# Patient Record
Sex: FEMALE | Race: WHITE | Employment: OTHER | URBAN - METROPOLITAN AREA
[De-identification: names, ages, dates, MRNs, and addresses within clinical notes are randomized per-mention and may not be internally consistent; named-entity substitution may affect disease eponyms.]

---

## 2017-01-10 ENCOUNTER — TELEPHONE (OUTPATIENT)
Dept: NEUROLOGY | Facility: CLINIC | Age: 67
End: 2017-01-10

## 2017-01-10 ENCOUNTER — OFFICE VISIT (OUTPATIENT)
Dept: NEUROLOGY | Facility: CLINIC | Age: 67
End: 2017-01-10

## 2017-01-10 VITALS
WEIGHT: 207 LBS | DIASTOLIC BLOOD PRESSURE: 60 MMHG | HEART RATE: 90 BPM | BODY MASS INDEX: 34.49 KG/M2 | SYSTOLIC BLOOD PRESSURE: 114 MMHG | RESPIRATION RATE: 18 BRPM | HEIGHT: 65 IN

## 2017-01-10 DIAGNOSIS — G43.709 CHRONIC MIGRAINE WITHOUT AURA WITHOUT STATUS MIGRAINOSUS, NOT INTRACTABLE: Primary | ICD-10-CM

## 2017-01-10 PROCEDURE — 99205 OFFICE O/P NEW HI 60 MIN: CPT | Performed by: OTHER

## 2017-01-10 RX ORDER — DIVALPROEX SODIUM 500 MG/1
500 TABLET, EXTENDED RELEASE ORAL 2 TIMES DAILY
Qty: 60 TABLET | Refills: 6 | COMMUNITY
Start: 2017-01-10 | End: 2017-10-23

## 2017-01-10 RX ORDER — DIVALPROEX SODIUM 500 MG/1
500 TABLET, EXTENDED RELEASE ORAL 2 TIMES DAILY
Qty: 1 TABLET | Refills: 6 | Status: SHIPPED | OUTPATIENT
Start: 2017-01-10 | End: 2017-01-10

## 2017-01-10 RX ORDER — ANTIARTHRITIC COMBINATION NO.2 900 MG
TABLET ORAL
COMMUNITY
End: 2017-05-23

## 2017-01-10 NOTE — TELEPHONE ENCOUNTER
Mike Self calling from [address] to clarify quantity on Depakote. Noted Depakote was ordered for quantity of 1 tablet. Clarified provider meant to order 60 tabs. Mike Self verbalized understanding.  She will dispense Depakote  mg #60 tabs for p

## 2017-01-10 NOTE — PROGRESS NOTES
Joanna 1827   Neurology- INITIAL CLINIC VISIT  1/10/2017, 2:06 PM     Aleena Parsons Patient Status:  No patient class for patient encounter    1950 MRN [id number]   Location [facility], [facility] Eosinophils %        1.3   Basophils %        0.6   Immature Granulocyte %        0.3   Glucose      70-99 mg/dL 81    BUN      8-20 mg/dL 15    CREATININE      0.55-1.02 mg/dL 0.75    GFR      >=60 83    CALCIUM      8.3-10.3 mg/dL 9.6    ALKALINE PHOSP or use illicit drugs. Allergies:    Sulfa Antibiotics           MEDICATIONS:    Current outpatient prescriptions:   •  Calcium Carbonate-Vitamin D (OS-SHELBY 500 + D OR), Take 1 tablet by mouth 2 (two) times daily. , Disp: , Rfl:   •  Biotin 5000 MCG Oral T Tab, Take 100 mcg by mouth daily. , Disp: , Rfl:   •  fluticasone (FLONASE) 50 MCG/ACT Nasal Suspension, 2 sprays by Nasal route daily as needed.  Indications: Hayfever, Disp: , Rfl:   •  metFORMIN (GLUCOPHAGE) 500 MG Oral Tab, Take 500 mg by mouth 2 (two) t swelling        PHYSICAL EXAM:   Neurologic Exam  Vitals   01/10/17  1329   BP: 114/60   Pulse: 90   Resp: 18     General Appearance: Patient is a 77year old female in no acute distress  Cardiac: Normal rate & regular rhythm  Lungs: Clear to auscultation We discussed in depth regarding the diagnosis, prognosis, treatment. The patient was given ample opportunity to ask questions. All questions and concerns were addressed.      Mare Yan DO  Neurology and Neuromuscular medicine  Sulema Ayala

## 2017-01-10 NOTE — PATIENT INSTRUCTIONS
Refill policies:    • Allow 2 business days for refills; controlled substances may take longer.   • Contact your pharmacy at least 5 days prior to running out of medication and have them send an electronic request or submit request through the “request re your physician has recommended that you have a procedure or additional testing performed. [facility]) will contact your insurance carrier to obtain pre-certification or prior authorization.     Unfortunately, ABDELRAHMAN has seen an increas

## 2017-01-12 ENCOUNTER — HOSPITAL ENCOUNTER (OUTPATIENT)
Dept: MAMMOGRAPHY | Age: 67
Discharge: HOME | End: 2017-01-12
Attending: FAMILY MEDICINE
Payer: MEDICARE

## 2017-01-12 ENCOUNTER — HOSPITAL ENCOUNTER (OUTPATIENT)
Dept: BONE DENSITY | Age: 67
Discharge: HOME | End: 2017-01-12
Attending: FAMILY MEDICINE
Payer: MEDICARE

## 2017-01-12 DIAGNOSIS — Z12.31 ENCOUNTER FOR SCREENING MAMMOGRAM FOR MALIGNANT NEOPLASM OF BREAST: ICD-10-CM

## 2017-01-12 DIAGNOSIS — Z78.0 ASYMPTOMATIC MENOPAUSE: ICD-10-CM

## 2017-01-12 DIAGNOSIS — Z00.00 ROUTINE GENERAL MEDICAL EXAMINATION AT A HEALTH CARE FACILITY: ICD-10-CM

## 2017-01-12 PROCEDURE — 77080 DXA BONE DENSITY AXIAL: CPT

## 2017-01-12 PROCEDURE — 77063 BREAST TOMOSYNTHESIS BI: CPT

## 2017-01-12 PROCEDURE — 77067 SCR MAMMO BI INCL CAD: CPT

## 2017-01-19 ENCOUNTER — HOSPITAL ENCOUNTER (OUTPATIENT)
Dept: MAMMOGRAPHY | Facility: HOSPITAL | Age: 67
Discharge: HOME | End: 2017-01-19
Attending: FAMILY MEDICINE
Payer: MEDICARE

## 2017-01-19 DIAGNOSIS — R92.2 INCONCLUSIVE MAMMOGRAM: ICD-10-CM

## 2017-01-19 PROCEDURE — 76642 ULTRASOUND BREAST LIMITED: CPT

## 2017-01-19 PROCEDURE — 77065 DX MAMMO INCL CAD UNI: CPT

## 2017-01-19 PROCEDURE — 77061 BREAST TOMOSYNTHESIS UNI: CPT

## 2017-01-30 ENCOUNTER — NURSE ONLY (OUTPATIENT)
Dept: FAMILY MEDICINE CLINIC | Facility: CLINIC | Age: 67
End: 2017-01-30

## 2017-01-30 VITALS
TEMPERATURE: 98 F | HEIGHT: 65 IN | SYSTOLIC BLOOD PRESSURE: 110 MMHG | DIASTOLIC BLOOD PRESSURE: 72 MMHG | BODY MASS INDEX: 34.16 KG/M2 | RESPIRATION RATE: 16 BRPM | HEART RATE: 85 BPM | OXYGEN SATURATION: 96 % | WEIGHT: 205 LBS

## 2017-01-30 DIAGNOSIS — J06.9 VIRAL UPPER RESPIRATORY TRACT INFECTION: Primary | ICD-10-CM

## 2017-01-30 PROCEDURE — 99213 OFFICE O/P EST LOW 20 MIN: CPT | Performed by: NURSE PRACTITIONER

## 2017-01-31 NOTE — PATIENT INSTRUCTIONS
Understanding the Cold Virus  Colds are the most common illness that people get. Most adults get 2 or 3 colds per year, and most children get 5 to 7 colds per year. Colds may be caused by over 200 types of viruses.  The most common of these are rhinovirus You can help reduce the spread of cold viruses. This can help both you and others avoid getting colds. Follow these tips:  · Wash your hands well anytime you may have come into contact with cold viruses. Wash your hands for at least 20 seconds.  When you ca

## 2017-01-31 NOTE — PROGRESS NOTES
CHIEF COMPLAINT:   Patient presents with:  Sinus Problem: s/s for 3 days  Nasal Congestion  Ear Problem: fells full      HPI:   Ovi Mauricio is a 77year old female who presents for upper respiratory symptoms for  3 days.  Patient reports sore throat, Coenzyme Q10 (COQ10) 400 MG Oral Cap Take 400 mg by mouth every morning. Disp:  Rfl:    Multiple Vitamins-Minerals (ALIVE WOMENS 50+) Oral Tab Take 1 tablet by mouth every morning.  Indications: supplement Disp:  Rfl:    Probiotic Product (PROBIOTIC OR) [facility] KNEE SURGERY  3/8/2015     KNEE SURGERY  9/8/2015     Comment 3 knee surgeries, replacement and arthoscopy     COLONOSCOPY  2012    Comment [facility]- diverticulosis , polyps.  had EGD same time           Smoking Status: Never Smoker                      S Educated on using supportive medication as needed  Educated on s/s of worsening sx and when to seek higher level of care  Follow up with pcp if not improving    Meds & Refills for this Visit:    No prescriptions requested or ordered in this encounter    Ri Antibiotics are not helpful for a cold. They do not make a cold shorter or relieve symptoms. Taking antibiotics when you don’t need them can make them work less well when you need them for another illness.   Follow all directions for using medicines, especi © 8428-9626 The [address] [place] [address], [address]. All rights reserved. This information is not intended as a substitute for professional medical care. Always follow your healthcare professional's instructions.             The

## 2017-02-08 ENCOUNTER — APPOINTMENT (OUTPATIENT)
Dept: LAB | Age: 67
End: 2017-02-08
Attending: ORTHOPAEDIC SURGERY
Payer: MEDICARE

## 2017-02-08 DIAGNOSIS — E87.1 HYPONATREMIA: ICD-10-CM

## 2017-02-08 DIAGNOSIS — Z01.818 PREPROCEDURAL EXAMINATION: ICD-10-CM

## 2017-02-08 LAB
ALBUMIN SERPL-MCNC: 3.7 G/DL (ref 3.5–4.8)
ALP LIVER SERPL-CCNC: 76 U/L (ref 55–142)
ALT SERPL-CCNC: 22 U/L (ref 14–54)
AST SERPL-CCNC: 16 U/L (ref 15–41)
BILIRUB SERPL-MCNC: 0.4 MG/DL (ref 0.1–2)
BUN BLD-MCNC: 17 MG/DL (ref 8–20)
CALCIUM BLD-MCNC: 8.9 MG/DL (ref 8.3–10.3)
CHLORIDE: 97 MMOL/L (ref 101–111)
CO2: 33 MMOL/L (ref 22–32)
CREAT BLD-MCNC: 0.76 MG/DL (ref 0.55–1.02)
GLUCOSE BLD-MCNC: 87 MG/DL (ref 70–99)
M PROTEIN MFR SERPL ELPH: 7.1 G/DL (ref 6.1–8.3)
POTASSIUM SERPL-SCNC: 4.7 MMOL/L (ref 3.6–5.1)
SODIUM SERPL-SCNC: 134 MMOL/L (ref 136–144)

## 2017-02-08 PROCEDURE — 36415 COLL VENOUS BLD VENIPUNCTURE: CPT

## 2017-02-08 PROCEDURE — 80053 COMPREHEN METABOLIC PANEL: CPT

## 2017-02-10 ENCOUNTER — TELEPHONE (OUTPATIENT)
Dept: FAMILY MEDICINE CLINIC | Facility: CLINIC | Age: 67
End: 2017-02-10

## 2017-02-10 NOTE — TELEPHONE ENCOUNTER
LMOM to return my call. I advised patient to call [phone] along with office hours given.      Notes Recorded by Jose Antunez DO on 2/10/2017 at 1:00 AM  Still with borderline hyponatremia.  Etiology unclear  Refer Nephrology  Dr. Church     Nephrolog

## 2017-02-10 NOTE — TELEPHONE ENCOUNTER
Instructed patient to follow up with Dr. Jerald Weeks nephrologist due to borderline hyponatremia. Provided patient with drMaria Teresa's name, phone, and address. Instructed to patient to call our office with further questions.      Notes Recorded by Rica Hall DO on 2/

## 2017-02-23 ENCOUNTER — TELEPHONE (OUTPATIENT)
Dept: OBGYN CLINIC | Facility: CLINIC | Age: 67
End: 2017-02-23

## 2017-02-23 NOTE — TELEPHONE ENCOUNTER
Patient was no show today for her appointment with Dr. Mony Ford in our [place] office.  LM to call office back if wants to reschedule

## 2017-03-02 ENCOUNTER — TELEPHONE (OUTPATIENT)
Dept: OBGYN CLINIC | Facility: CLINIC | Age: 67
End: 2017-03-02

## 2017-03-07 ENCOUNTER — OFFICE VISIT (OUTPATIENT)
Dept: FAMILY MEDICINE CLINIC | Facility: CLINIC | Age: 67
End: 2017-03-07

## 2017-03-07 VITALS
RESPIRATION RATE: 16 BRPM | HEART RATE: 86 BPM | DIASTOLIC BLOOD PRESSURE: 60 MMHG | OXYGEN SATURATION: 98 % | BODY MASS INDEX: 34 KG/M2 | SYSTOLIC BLOOD PRESSURE: 118 MMHG | WEIGHT: 206 LBS | TEMPERATURE: 98 F

## 2017-03-07 DIAGNOSIS — J01.10 ACUTE NON-RECURRENT FRONTAL SINUSITIS: Primary | ICD-10-CM

## 2017-03-07 DIAGNOSIS — L84 FOOT CALLUS: ICD-10-CM

## 2017-03-07 PROCEDURE — 99213 OFFICE O/P EST LOW 20 MIN: CPT | Performed by: FAMILY MEDICINE

## 2017-03-07 RX ORDER — AMOXICILLIN AND CLAVULANATE POTASSIUM 875; 125 MG/1; MG/1
1 TABLET, FILM COATED ORAL 2 TIMES DAILY
Qty: 20 TABLET | Refills: 0 | Status: SHIPPED | OUTPATIENT
Start: 2017-03-07 | End: 2017-03-23

## 2017-03-07 RX ORDER — FLUTICASONE PROPIONATE 50 MCG
2 SPRAY, SUSPENSION (ML) NASAL DAILY
Qty: 1 BOTTLE | Refills: 6 | Status: SHIPPED | OUTPATIENT
Start: 2017-03-07 | End: 2017-03-07

## 2017-03-07 RX ORDER — FLUTICASONE PROPIONATE 50 MCG
SPRAY, SUSPENSION (ML) NASAL
Qty: 3 BOTTLE | Refills: 1 | Status: SHIPPED | OUTPATIENT
Start: 2017-03-07 | End: 2018-02-28

## 2017-03-07 NOTE — PATIENT INSTRUCTIONS
Dr. Cintron Flax and Ankle surgery    [address], [place]: [address] [facility].     Phone: [phone]  Fax [phone]      · Please increase your fluids and rest.   · PLEASE take all your antibiotic as prescribed or the infection will be The sinuses are air-filled spaces within the bones of the face. They connect to the inside of the nose. Sinusitis is an inflammation of the tissue lining the sinus cavity. Sinus inflammation can occur during a cold.  It can also be due to allergies to polle · Do not use nasal rinses or irrigation during an acute sinus infection, unless told to by your health care provider. Rinsing may spread the infection to other sinuses.   · Use acetaminophen or ibuprofen to control pain, unless another pain medicine was pre Colds and other infections  A cold or flu may cause your sinus and nasal linings to swell. Sinus openings can become blocked. This causes mucus to back up. This backed-up mucus becomes an ideal place for bacteria to grow.  Thick, yellow, or discolored mucus Drink fluids  Drinking extra fluids helps thin your mucus. This lets it drain from your sinuses more easily. Have a glass of water every hour or two. A humidifier helps in much the same way. Fluids can also offset the drying effects of certain medicines.  I When traveling on an airplane, use saline nasal spray to keep your sinuses moist. Drink plenty of fluids. You may also want to take a decongestant before you get on the plane. Prevent colds  Do what you can to avoid being exposed to colds and flu.  When p © 0118-0674 [address], [address] [place]. All rights reserved. This information is not intended as a substitute for professional medical care. Always follow your healthcare professional's instructions.

## 2017-03-07 NOTE — PROGRESS NOTES
Patient presents with:  Sinus Problem: 2 weeks  Cough: 2 weeks  Ear Pain: recently      HPI:    Ovi Mauricio is a 77year old female. Presents with sinus issues.  Symptoms started   14 days ago with purulent nasal discharge-yellow, maxillary sinus pre MG Oral Tab Take 1 tablet (40 mg total) by mouth nightly. Disp: 90 tablet Rfl: 1   lisinopril 10 MG Oral Tab Take 1 tablet (10 mg total) by mouth daily.  Disp: 90 tablet Rfl: 1   Levothyroxine Sodium (SYNTHROID) 50 MCG Oral Tab Take 1 tablet (50 mcg total) Disp:  Rfl:       Past Medical History   Diagnosis Date   • Extrinsic asthma, unspecified    • Unspecified essential hypertension    • Other and unspecified hyperlipidemia    • HEADACHES      uses depakote   • Esophageal reflux      last EGD 2014-gastritis no septal deviations. Mouth: moist with mild erythema, no exudates, and + PND. Neck: Supple with no cervical LAD. Lungs: Clear to auscultation bilaterally; no wheeze, rhonchi, or rales. Heart: S1/S2 regular no murmurs. Skin: No acute rashes.   Ref

## 2017-03-14 ENCOUNTER — OFFICE VISIT (OUTPATIENT)
Dept: FAMILY MEDICINE CLINIC | Facility: CLINIC | Age: 67
End: 2017-03-14

## 2017-03-14 VITALS
RESPIRATION RATE: 16 BRPM | SYSTOLIC BLOOD PRESSURE: 122 MMHG | HEART RATE: 84 BPM | DIASTOLIC BLOOD PRESSURE: 68 MMHG | OXYGEN SATURATION: 99 % | TEMPERATURE: 98 F

## 2017-03-14 DIAGNOSIS — Z96.659 PAINFUL TOTAL KNEE REPLACEMENT, INITIAL ENCOUNTER (HCC): ICD-10-CM

## 2017-03-14 DIAGNOSIS — J01.10 ACUTE NON-RECURRENT FRONTAL SINUSITIS: Primary | ICD-10-CM

## 2017-03-14 DIAGNOSIS — M75.22 BICEPS TENDONITIS, LEFT: ICD-10-CM

## 2017-03-14 DIAGNOSIS — T84.84XA PAINFUL TOTAL KNEE REPLACEMENT, INITIAL ENCOUNTER (HCC): ICD-10-CM

## 2017-03-14 PROBLEM — M75.20 BICEPS TENDONITIS: Status: ACTIVE | Noted: 2017-03-14

## 2017-03-14 PROCEDURE — 99214 OFFICE O/P EST MOD 30 MIN: CPT | Performed by: FAMILY MEDICINE

## 2017-03-14 RX ORDER — METHYLPREDNISOLONE 4 MG/1
TABLET ORAL
Qty: 21 TABLET | Refills: 0 | Status: SHIPPED | OUTPATIENT
Start: 2017-03-14 | End: 2017-03-21 | Stop reason: ALTCHOICE

## 2017-03-14 RX ORDER — AMOXICILLIN AND CLAVULANATE POTASSIUM 875; 125 MG/1; MG/1
1 TABLET, FILM COATED ORAL 2 TIMES DAILY
Qty: 14 TABLET | Refills: 1 | Status: SHIPPED | OUTPATIENT
Start: 2017-03-14 | End: 2017-03-18

## 2017-03-14 RX ORDER — BUPROPION HYDROCHLORIDE 300 MG/1
TABLET ORAL
Refills: 2 | COMMUNITY
Start: 2017-03-07 | End: 2020-09-11

## 2017-03-14 RX ORDER — TRAMADOL HYDROCHLORIDE 50 MG/1
50 TABLET ORAL EVERY 6 HOURS PRN
Qty: 30 TABLET | Refills: 1 | Status: SHIPPED | OUTPATIENT
Start: 2017-03-14 | End: 2017-03-30 | Stop reason: ALTCHOICE

## 2017-03-14 NOTE — PATIENT INSTRUCTIONS
[facility] [address] [facility], [address] [facility], [address]   [address], [address] [facility], [facility] 95   [address], [address] [place] [address], [address] [place] [address] Terri Barksdale

## 2017-03-14 NOTE — PROGRESS NOTES
Patient presents with:  Arm Pain: L side, started this morning  Sinus Problem: continues  Fatigue      HPI:    Paz Villalobos is a 77year old female. Presents with sinus issues and left shoulder pain and needs placard renewed for right knee pain.   [place] handicapped parking. Will  frequently will rest when walking and shopping. Denies pain in the calf or claudication symptoms.         Current Outpatient Prescriptions:  Amoxicillin-Pot Clavulanate 875-125 MG Oral Tab Take 1 tablet by mouth 2 (two) times vikki Omeprazole 40 MG Oral Capsule Delayed Release Take 1 capsule (40 mg total) by mouth daily. In am after synthroid Disp: 90 capsule Rfl: 1   Coenzyme Q10 (COQ10) 400 MG Oral Cap Take 400 mg by mouth every morning.  Disp:  Rfl:    Probiotic Product (Leonardo Spindle • Hypertension Mother    • Lipids Mother    • Diabetes Maternal Grandmother    • Cancer Paternal Grandmother    • Hypertension Paternal Grandmother    • Heart Disorder Maternal Uncle    • Obesity Paternal Grandmother       Social History:    Smoking Stat surgical changes and limited range of motion flexion only to 90° and can extend to 110°. ASSESSMENT/ PLAN:   Armani Hinojosa is a 77year old female. Presents with sinus issues.     1. Acute non-recurrent frontal sinusitis  Improved but not resolved  Ne

## 2017-03-21 ENCOUNTER — HOSPITAL ENCOUNTER (OUTPATIENT)
Dept: GENERAL RADIOLOGY | Age: 67
Discharge: HOME | End: 2017-03-21
Attending: FAMILY MEDICINE
Payer: MEDICARE

## 2017-03-21 DIAGNOSIS — M75.22 BICEPS TENDONITIS, LEFT: ICD-10-CM

## 2017-03-21 PROCEDURE — 73030 X-RAY EXAM OF SHOULDER: CPT

## 2017-03-22 ENCOUNTER — TELEPHONE (OUTPATIENT)
Dept: FAMILY MEDICINE CLINIC | Facility: CLINIC | Age: 67
End: 2017-03-22

## 2017-03-22 NOTE — TELEPHONE ENCOUNTER
Spoke with pt regarding results and recommendations, appointment scheduled      Notes Recorded by Andrew Alatorre DO on 3/21/2017 at 4:38 PM  Degenerative changes.  Needs FU appt to discuss results.  May follow-up this week or wait until first week of April

## 2017-03-23 ENCOUNTER — OFFICE VISIT (OUTPATIENT)
Dept: FAMILY MEDICINE CLINIC | Facility: CLINIC | Age: 67
End: 2017-03-23

## 2017-03-23 VITALS
HEART RATE: 88 BPM | BODY MASS INDEX: 34 KG/M2 | TEMPERATURE: 98 F | SYSTOLIC BLOOD PRESSURE: 118 MMHG | DIASTOLIC BLOOD PRESSURE: 74 MMHG | WEIGHT: 204 LBS | OXYGEN SATURATION: 98 % | RESPIRATION RATE: 18 BRPM

## 2017-03-23 DIAGNOSIS — M75.22 BICEPS TENDONITIS, LEFT: Primary | ICD-10-CM

## 2017-03-23 PROCEDURE — 99213 OFFICE O/P EST LOW 20 MIN: CPT | Performed by: FAMILY MEDICINE

## 2017-03-23 NOTE — PATIENT INSTRUCTIONS
Please follow up with   [facility]  [id number] [facility]  (ADULTS)- Office: [phone]  · ?   Dr. Satya Pérez Dr[address] [facility] at 9: 00am        Understanding Biceps Tendonitis    A tendon is a strong band of · Medicines. These help relieve pain and swelling. NSAIDs (nonsteroidal anti-inflammatory drugs) are the most common medicines used. Medicines may be prescribed or bought over-the-counter. They may be given as pills.  Or they may be applied to the skin in t Arthroscopic capsular release may free the capsule and ligaments (area shown above with dotted line). Injection therapy  Your healthcare provider may suggest injection therapy. This does not cure frozen shoulder.  But it may reduce pain, so that you can

## 2017-03-23 NOTE — PROGRESS NOTES
Patient presents with: Follow - Up: x-ray results       HPI:    Joseph Sykes is a 77year old female. Presents for follow-up of left shoulder pain ×2-3 weeks with minimal improvement. Completed steroid pack and felt \"a little better\".   Still with to check blood sugar daily. Disp: 1 Box Rfl: 0   ferrous sulfate 325 (65 FE) MG Oral Tab EC Take 325 mg by mouth daily with breakfast. Disp:  Rfl:    Montelukast Sodium 10 MG Oral Tab Take 10 mg by mouth nightly.  Disp:  Rfl:    Brexpiprazole (REXULTI) 1 MG 1      Past Medical History   Diagnosis Date   • Extrinsic asthma, unspecified    • Unspecified essential hypertension    • Other and unspecified hyperlipidemia    • HEADACHES      uses depakote   • Esophageal reflux      last EGD [id number] (Oral)  Resp 18  Wt 204 lb  SpO2 98%  General: Well-nourished, well hydrated. No acute distress. No pallor. No tachypnea. Non toxic. HEENT: normocephaly, atraumatic. Sclera clear and non icteric bilaterally. Neck: supple. No adenopathy. No thyromegaly. Along bicipital groove and loss of range of motion. No pain over rotator cuff impingement sign is negative.    Will refer to Dr. Rachel Mendez at 9:00am tomorrow  for possible steroid injection or further evaluation-possible frozen shoulder -since difficult to l

## 2017-03-24 PROBLEM — M75.52 BURSITIS, SHOULDER, LEFT: Status: ACTIVE | Noted: 2017-03-24

## 2017-03-24 PROBLEM — M75.42 IMPINGEMENT SYNDROME, SHOULDER, LEFT: Status: ACTIVE | Noted: 2017-03-24

## 2017-03-30 ENCOUNTER — EKG ENCOUNTER (OUTPATIENT)
Dept: LAB | Facility: HOSPITAL | Age: 67
End: 2017-03-30
Payer: MEDICARE

## 2017-03-30 ENCOUNTER — OFFICE VISIT (OUTPATIENT)
Dept: NEPHROLOGY | Facility: CLINIC | Age: 67
End: 2017-03-30

## 2017-03-30 VITALS — WEIGHT: 202 LBS | SYSTOLIC BLOOD PRESSURE: 134 MMHG | BODY MASS INDEX: 34 KG/M2 | DIASTOLIC BLOOD PRESSURE: 76 MMHG

## 2017-03-30 DIAGNOSIS — E87.1 HYPONATREMIA: Primary | ICD-10-CM

## 2017-03-30 DIAGNOSIS — Z86.010 HISTORY OF COLON POLYPS: ICD-10-CM

## 2017-03-30 LAB
ATRIAL RATE: 90 BPM
P AXIS: 61 DEGREES
P-R INTERVAL: 144 MS
Q-T INTERVAL: 362 MS
QRS DURATION: 88 MS
QTC CALCULATION (BEZET): 442 MS
R AXIS: 51 DEGREES
T AXIS: 34 DEGREES
VENTRICULAR RATE: 90 BPM

## 2017-03-30 PROCEDURE — 99204 OFFICE O/P NEW MOD 45 MIN: CPT | Performed by: INTERNAL MEDICINE

## 2017-03-30 PROCEDURE — 93005 ELECTROCARDIOGRAM TRACING: CPT

## 2017-03-30 PROCEDURE — 93010 ELECTROCARDIOGRAM REPORT: CPT | Performed by: INTERNAL MEDICINE

## 2017-03-30 RX ORDER — CETIRIZINE HYDROCHLORIDE 10 MG/1
10 TABLET ORAL AS NEEDED
COMMUNITY
End: 2017-05-23

## 2017-03-30 RX ORDER — DICYCLOMINE HCL 20 MG
20 TABLET ORAL DAILY
COMMUNITY
End: 2017-05-23 | Stop reason: ALTCHOICE

## 2017-03-30 NOTE — PROGRESS NOTES
Nephrology Consult Note    REASON FOR CONSULT: Hyponatremia    ASSESSMENT/PLAN:        1) Hyponatremia- recently low serum sodium in the 133-135 mEq/L range likely reflects a combination of a low-sodium diet, generous fluid intake (3-4 L qd), and medicatio 3-4 L per day; she also tries maintain a low-sodium diet. There is no history of other endocrine disorders such as thyroid dysfunction, adrenal insufficiency or pituitary abnormalities. There is no history of malignancy. She does not smoke or drink. Calcium Carbonate-Vitamin D (OS-SHELBY 500 + D OR) Take 1 tablet by mouth 2 (two) times daily. Disp:  Rfl:    Biotin 5000 MCG Oral Tab Take by mouth.  Disp:  Rfl:    divalproex Sodium  MG Oral Tablet 24 Hr Take 1 tablet (500 mg total) by mouth 2 (two) times daily. Disp: 85 g Rfl: 1       Allergies:    Sulfa Antibiotics       Unknown    Comment:Carried over from childhood    Social History:  Social History    Marital Status:               Spouse Name:                       Years of Education:

## 2017-04-06 ENCOUNTER — ANESTHESIA EVENT (OUTPATIENT)
Dept: ENDOSCOPY | Facility: HOSPITAL | Age: 67
End: 2017-04-06
Payer: MEDICARE

## 2017-04-07 ENCOUNTER — ANESTHESIA (OUTPATIENT)
Dept: ENDOSCOPY | Facility: HOSPITAL | Age: 67
End: 2017-04-07
Payer: MEDICARE

## 2017-04-07 ENCOUNTER — HOSPITAL ENCOUNTER (OUTPATIENT)
Facility: HOSPITAL | Age: 67
Setting detail: HOSPITAL OUTPATIENT SURGERY
Discharge: HOME | End: 2017-04-07
Attending: INTERNAL MEDICINE | Admitting: INTERNAL MEDICINE
Payer: MEDICARE

## 2017-04-07 ENCOUNTER — SURGERY (OUTPATIENT)
Age: 67
End: 2017-04-07

## 2017-04-07 VITALS
HEIGHT: 65 IN | SYSTOLIC BLOOD PRESSURE: 95 MMHG | TEMPERATURE: 98 F | BODY MASS INDEX: 32.65 KG/M2 | OXYGEN SATURATION: 100 % | DIASTOLIC BLOOD PRESSURE: 78 MMHG | RESPIRATION RATE: 18 BRPM | HEART RATE: 78 BPM | WEIGHT: 196 LBS

## 2017-04-07 DIAGNOSIS — Z86.010 HISTORY OF COLON POLYPS: Primary | ICD-10-CM

## 2017-04-07 PROCEDURE — 0DJD8ZZ INSPECTION OF LOWER INTESTINAL TRACT, VIA NATURAL OR ARTIFICIAL OPENING ENDOSCOPIC: ICD-10-PCS | Performed by: INTERNAL MEDICINE

## 2017-04-07 PROCEDURE — 82962 GLUCOSE BLOOD TEST: CPT

## 2017-04-07 RX ORDER — DEXTROSE MONOHYDRATE 25 G/50ML
50 INJECTION, SOLUTION INTRAVENOUS
Status: CANCELLED | OUTPATIENT
Start: 2017-04-07

## 2017-04-07 RX ORDER — SODIUM CHLORIDE, SODIUM LACTATE, POTASSIUM CHLORIDE, CALCIUM CHLORIDE 600; 310; 30; 20 MG/100ML; MG/100ML; MG/100ML; MG/100ML
INJECTION, SOLUTION INTRAVENOUS CONTINUOUS
Status: CANCELLED | OUTPATIENT
Start: 2017-04-07

## 2017-04-07 RX ORDER — SODIUM CHLORIDE, SODIUM LACTATE, POTASSIUM CHLORIDE, CALCIUM CHLORIDE 600; 310; 30; 20 MG/100ML; MG/100ML; MG/100ML; MG/100ML
INJECTION, SOLUTION INTRAVENOUS CONTINUOUS
Status: DISCONTINUED | OUTPATIENT
Start: 2017-04-07 | End: 2017-04-07

## 2017-04-07 RX ORDER — NALOXONE HYDROCHLORIDE 0.4 MG/ML
80 INJECTION, SOLUTION INTRAMUSCULAR; INTRAVENOUS; SUBCUTANEOUS AS NEEDED
Status: CANCELLED | OUTPATIENT
Start: 2017-04-07 | End: 2017-04-07

## 2017-04-07 NOTE — ANESTHESIA POSTPROCEDURE EVALUATION
[facility] Patient Status:  Hospital Outpatient Surgery   Age/Gender 77year old female MRN [id number]   [facility] Attending Ana Frederick MD   Hosp Day # 0 PCP Martin Rosas DO       Anesthesia Post-op

## 2017-04-07 NOTE — H&P
[facility]  Pre-procedure History and Physical      Srinivasa Valadez Issa Patient Status:  Hospital Outpatient Surgery    1950 MRN [id number]   [facility] ENDOSCOPY Attending Ana Frederick MD   [facility] Day #  PCP Martin Rosas DO

## 2017-04-07 NOTE — ANESTHESIA PREPROCEDURE EVALUATION
PRE-OP EVALUATION    Patient Name: Saida Parsons    Pre-op Diagnosis: History of colon polyps [Z86.010]    Procedure(s):  COLONOSCOPY    Surgeon(s) and Role:     Vandana Beaver MD - Primary    Pre-op vitals reviewed.   Temp: 98.2 °F (36.8 °C)  Pulse Rfl: 1   lisinopril 10 MG Oral Tab Take 1 tablet (10 mg total) by mouth daily. Disp: 90 tablet Rfl: 1   Levothyroxine Sodium (SYNTHROID) 50 MCG Oral Tab Take 1 tablet (50 mcg total) by mouth daily.  Disp: 90 tablet Rfl: 1   Omeprazole 40 MG Oral Capsule Del Smokeless tobacco: Never Used    Alcohol Use: No       Drug Use: No     Available pre-op labs reviewed.        Lab Results  Component Value Date   * 02/08/2017   K 4.7 02/08/2017   CL 97* 02/08/2017   CO2 33.0* 02/08/2017   BUN 17 02/08/2017   ENOCH

## 2017-04-07 NOTE — OPERATIVE REPORT
[facility]  Colonoscopy Report      Cynthia Hartilippishmael Patient Status:  Hospital Outpatient Surgery    1950 MRN [id number]   [facility] ENDOSCOPY Attending Marlene Spera MD       DATE OF OPERATION: 2017     PREOPERATIVE DAVINA

## 2017-05-13 ENCOUNTER — OFFICE VISIT (OUTPATIENT)
Dept: FAMILY MEDICINE CLINIC | Facility: CLINIC | Age: 67
End: 2017-05-13

## 2017-05-13 VITALS
DIASTOLIC BLOOD PRESSURE: 60 MMHG | OXYGEN SATURATION: 98 % | TEMPERATURE: 98 F | SYSTOLIC BLOOD PRESSURE: 98 MMHG | HEART RATE: 91 BPM | RESPIRATION RATE: 16 BRPM

## 2017-05-13 DIAGNOSIS — J02.9 PHARYNGITIS, UNSPECIFIED ETIOLOGY: Primary | ICD-10-CM

## 2017-05-13 DIAGNOSIS — Z20.818 STREP THROAT EXPOSURE: ICD-10-CM

## 2017-05-13 PROCEDURE — 99213 OFFICE O/P EST LOW 20 MIN: CPT | Performed by: PHYSICIAN ASSISTANT

## 2017-05-13 PROCEDURE — 87081 CULTURE SCREEN ONLY: CPT | Performed by: PHYSICIAN ASSISTANT

## 2017-05-13 PROCEDURE — 87880 STREP A ASSAY W/OPTIC: CPT | Performed by: PHYSICIAN ASSISTANT

## 2017-05-13 RX ORDER — BENZONATATE 200 MG/1
200 CAPSULE ORAL 3 TIMES DAILY PRN
Qty: 30 CAPSULE | Refills: 0 | Status: SHIPPED | OUTPATIENT
Start: 2017-05-13 | End: 2017-05-23 | Stop reason: ALTCHOICE

## 2017-05-13 NOTE — PROGRESS NOTES
CHIEF COMPLAINT:   Patient presents with:  Sore Throat: sinus headache.  symptoms last night.  grandchildren have strep. HPI:      Neal Springer is a 77year old female who presents for upper respiratory symptoms since last night.   She reports so Product (PROBIOTIC OR) Take 1 capsule by mouth every morning. Indications: supplement Disp:  Rfl:    vitamin B-12 (CYANOCOBALAMIN) 100 MCG Oral Tab Take 100 mcg by mouth daily.  Disp:  Rfl:    metFORMIN (GLUCOPHAGE) 500 MG Oral Tab Take 500 mg by mouth 2 (t Comment right arm carpal tunnel    KNEE SURGERY  3/8/2015     KNEE SURGERY  9/8/2015     Comment 3 knee surgeries, replacement and arthoscopy     COLONOSCOPY  2012    Comment [facility]- diverticulosis , polyps.  had EGD same time    COLONOSCOPY N/A 4/7/2017 Encounter  Strep A Assay W/Optic  Grp A Strep Cult, Throat    Meds & Refills for this Visit:  Signed Prescriptions Disp Refills    benzonatate 200 MG Oral Cap 30 capsule 0      Sig: Take 1 capsule (200 mg total) by mouth 3 (three) times daily as needed for

## 2017-05-13 NOTE — PATIENT INSTRUCTIONS
Viral Pharyngitis (Sore Throat)    You (or your child, if your child is the patient) have pharyngitis (sore throat). This infection is caused by a virus. It can cause throat pain that is worse when swallowing, aching all over, headache, and fever.  The in · Fever as directed by your doctor.  For children, seek care if:  ¨ Your child is of any age and has repeated fevers above 104°F (40°C). ¨ Your child is younger than 3years of age and has a fever of 100.4°F (38°C) that continues for more than 1 day.   Deanna Lema

## 2017-05-23 ENCOUNTER — OFFICE VISIT (OUTPATIENT)
Dept: NEUROLOGY | Facility: CLINIC | Age: 67
End: 2017-05-23

## 2017-05-23 VITALS
DIASTOLIC BLOOD PRESSURE: 66 MMHG | WEIGHT: 199 LBS | RESPIRATION RATE: 16 BRPM | BODY MASS INDEX: 33 KG/M2 | HEART RATE: 92 BPM | SYSTOLIC BLOOD PRESSURE: 118 MMHG

## 2017-05-23 DIAGNOSIS — G43.709 CHRONIC MIGRAINE WITHOUT AURA WITHOUT STATUS MIGRAINOSUS, NOT INTRACTABLE: Primary | ICD-10-CM

## 2017-05-23 PROCEDURE — 99214 OFFICE O/P EST MOD 30 MIN: CPT | Performed by: OTHER

## 2017-05-23 RX ORDER — OXYCODONE HYDROCHLORIDE AND ACETAMINOPHEN 5; 325 MG/1; MG/1
1 TABLET ORAL EVERY 4 HOURS PRN
Qty: 10 TABLET | Refills: 0 | Status: SHIPPED | OUTPATIENT
Start: 2017-05-23 | End: 2017-09-22 | Stop reason: ALTCHOICE

## 2017-05-23 RX ORDER — ARIPIPRAZOLE 2 MG/1
2 TABLET ORAL EVERY EVENING
COMMUNITY
End: 2019-01-11

## 2017-05-23 NOTE — PROGRESS NOTES
Pt here for migraine follow up. Pt reports she hadn't had one until a few weeks ago, but thinks it might be due to weather. Pt here to discuss next steps.

## 2017-05-23 NOTE — PATIENT INSTRUCTIONS
Refill policies:    • Allow 2-3 business days for refills; controlled substances may take longer.   • Contact your pharmacy at least 5 days prior to running out of medication and have them send an electronic request or submit request through the [facility] have a procedure or additional testing performed. [facility]) will contact your insurance carrier to obtain pre-certification or prior authorization.     Unfortunately, ABDELRAHMAN has seen an increase in denial of payment even though the p

## 2017-05-23 NOTE — PROGRESS NOTES
Joanna 1827   Neurology- INITIAL CLINIC VISIT  1/10/2017, 2:06 PM     Saida Parsons Patient Status:  No patient class for patient encounter    1950 MRN [id number]   Location [address] [place] [address], [address] [place] [address] [facility] 0. 00-0.30 x10(3) uL  0.09   Basophils Absolute      0.00-0.10 x10(3) uL  0.04   Immature Granulocyte Absolute      0.00-1.00 x10(3) uL  0.02   Neutrophils %        60.9   Lymphocytes %        27.9   Monocytes %        9.0   Eosinophils %        1.3   Basop Comment Procedure: COLONOSCOPY;  Surgeon: Berry Hills MD;  Location: [address] [place] [address] ENDOSCOPY       Family History:  family history includes Cancer in her paternal grandmother; Diabetes in her maternal grandmother; Heart Disorder in her maternal uncle; Hypertensio 10 MG Oral Tab, Take 10 mg by mouth nightly., Disp: , Rfl:   •  Atorvastatin Calcium 40 MG Oral Tab, Take 1 tablet (40 mg total) by mouth nightly., Disp: 90 tablet, Rfl: 1  •  lisinopril 10 MG Oral Tab, Take 1 tablet (10 mg total) by mouth daily. , Disp: 90 05/23/17  1456   BP: 118/66   Pulse: 92   Resp: 16     General Appearance: Patient is a 77year old female in no acute distress  Cardiac: Normal rate & regular rhythm  Lungs: Clear to auscultation bilaterally  Skin: There are no rashes or other skin lesion not covered, will use alternative oxycodone/APAP, 10 tabs  discussed MOH and to avoid by taking abortives not more than 2-3 times per week        Signed Prescriptions Disp Refills    oxyCODONE-acetaminophen 5-325 MG Oral Tab 10 tablet 0      Sig: Take 1 ta

## 2017-06-12 DIAGNOSIS — Z87.19 HISTORY OF GASTRITIS: Primary | ICD-10-CM

## 2017-06-12 RX ORDER — OMEPRAZOLE 40 MG/1
40 CAPSULE, DELAYED RELEASE ORAL DAILY
Qty: 90 CAPSULE | Refills: 1 | Status: SHIPPED | OUTPATIENT
Start: 2017-06-12 | End: 2017-12-11

## 2017-06-12 NOTE — TELEPHONE ENCOUNTER
Pt requesting refill on omeprazole, no protocol, unable to approve    LOV 3/23/17    LF 12/15/16  #90 w/1    Future Appointments  Date Time Provider Seb Montgomery   8/24/2017 1:00 PM DO AIMEE Wetzel

## 2017-07-17 ENCOUNTER — TELEPHONE (OUTPATIENT)
Dept: FAMILY MEDICINE CLINIC | Facility: CLINIC | Age: 67
End: 2017-07-17

## 2017-07-17 DIAGNOSIS — R73.03 PREDIABETES: Primary | ICD-10-CM

## 2017-07-17 RX ORDER — LANCETS
1 EACH MISCELLANEOUS DAILY
Qty: 1 BOX | Refills: 0 | Status: SHIPPED | OUTPATIENT
Start: 2017-07-17 | End: 2018-03-05

## 2017-07-17 NOTE — TELEPHONE ENCOUNTER
Pt got the wrong lancets ordered. Has the glucose meter Accu-Chek Fastclix ---  Received the lancets for the Accu-Chek Multiclix and they do not work in her meter. Can she get a new script with the correct [facility] [address]?

## 2017-07-17 NOTE — TELEPHONE ENCOUNTER
Patient called requesting [facility]clix lancets [facility]. Rx sent through [facility] to [facility], patient notified.

## 2017-07-18 ENCOUNTER — TELEPHONE (OUTPATIENT)
Dept: FAMILY MEDICINE CLINIC | Facility: CLINIC | Age: 67
End: 2017-07-18

## 2017-07-18 DIAGNOSIS — R73.03 PREDIABETES: Primary | ICD-10-CM

## 2017-07-18 NOTE — TELEPHONE ENCOUNTER
LMOM for patient to return nurse's call regarding diabetic testing supplies. Canceled diabetic testing supplies since not covered. Patient's last fasting CMP 2/2017 was within normal limits.   Encourage patient to do regular exercise 3-5 times weekly a

## 2017-07-18 NOTE — TELEPHONE ENCOUNTER
Incoming call from [facility] pharmacy, pharmacy will not cover diabetic testing supplies as patient has medicare and dx code is for pre-diabetes. There is no dx of diabetes, last labs completed 2/2017, last A1C 2006.  Do you still want patient checking blood suga

## 2017-07-18 NOTE — TELEPHONE ENCOUNTER
Canceled diabetic testing supplies since not covered. Patient's last fasting CMP 2/2017 was within normal limits. Encourage patient to do regular exercise 3-5 times weekly and avoid sugar processed sugar and limit carbohydrates to 100 g or less daily.   Flo Villatoro

## 2017-07-19 ENCOUNTER — APPOINTMENT (OUTPATIENT)
Dept: LAB | Age: 67
End: 2017-07-19
Attending: FAMILY MEDICINE
Payer: MEDICARE

## 2017-07-19 DIAGNOSIS — Z00.00 ROUTINE GENERAL MEDICAL EXAMINATION AT A HEALTH CARE FACILITY: ICD-10-CM

## 2017-07-19 DIAGNOSIS — E03.9 ACQUIRED HYPOTHYROIDISM: ICD-10-CM

## 2017-07-19 DIAGNOSIS — R73.03 PREDIABETES: ICD-10-CM

## 2017-07-19 DIAGNOSIS — E78.2 MIXED DYSLIPIDEMIA: ICD-10-CM

## 2017-07-19 LAB
ALBUMIN SERPL-MCNC: 3.3 G/DL (ref 3.5–4.8)
ALP LIVER SERPL-CCNC: 67 U/L (ref 55–142)
ALT SERPL-CCNC: 20 U/L (ref 14–54)
AST SERPL-CCNC: 8 U/L (ref 15–41)
BILIRUB SERPL-MCNC: 0.3 MG/DL (ref 0.1–2)
BUN BLD-MCNC: 14 MG/DL (ref 8–20)
CALCIUM BLD-MCNC: 9 MG/DL (ref 8.3–10.3)
CHLORIDE: 104 MMOL/L (ref 101–111)
CHOLEST SMN-MCNC: 147 MG/DL (ref ?–200)
CO2: 30 MMOL/L (ref 22–32)
CREAT BLD-MCNC: 0.76 MG/DL (ref 0.55–1.02)
EST. AVERAGE GLUCOSE BLD GHB EST-MCNC: 108 MG/DL (ref 68–126)
FREE T4: 0.9 NG/DL (ref 0.9–1.8)
GLUCOSE BLD-MCNC: 79 MG/DL (ref 70–99)
HBA1C MFR BLD HPLC: 5.4 % (ref ?–5.7)
HDLC SERPL-MCNC: 60 MG/DL (ref 45–?)
HDLC SERPL: 2.45 {RATIO} (ref ?–4.44)
LDLC SERPL CALC-MCNC: 72 MG/DL (ref ?–130)
LDLC SERPL-MCNC: 15 MG/DL (ref 5–40)
M PROTEIN MFR SERPL ELPH: 6.2 G/DL (ref 6.1–8.3)
NONHDLC SERPL-MCNC: 87 MG/DL (ref ?–130)
POTASSIUM SERPL-SCNC: 4.7 MMOL/L (ref 3.6–5.1)
SODIUM SERPL-SCNC: 139 MMOL/L (ref 136–144)
TRIGLYCERIDES: 74 MG/DL (ref ?–150)
TSI SER-ACNC: 1.96 MIU/ML (ref 0.35–5.5)

## 2017-07-19 PROCEDURE — 83036 HEMOGLOBIN GLYCOSYLATED A1C: CPT

## 2017-07-19 PROCEDURE — 84439 ASSAY OF FREE THYROXINE: CPT

## 2017-07-19 PROCEDURE — 84443 ASSAY THYROID STIM HORMONE: CPT

## 2017-07-19 PROCEDURE — 80053 COMPREHEN METABOLIC PANEL: CPT

## 2017-07-19 PROCEDURE — 36415 COLL VENOUS BLD VENIPUNCTURE: CPT

## 2017-07-19 PROCEDURE — 80061 LIPID PANEL: CPT

## 2017-07-19 NOTE — TELEPHONE ENCOUNTER
Pt stopped into office, informed pt that Diabetic supplies, not covered under her insurance. Pt has previously had Fast clik pen and lancets, recently, pt received Multiclik lancets that do not fit her pen. Pt requests new pen to fit multiclick lancets.  Or

## 2017-07-21 ENCOUNTER — TELEPHONE (OUTPATIENT)
Dept: FAMILY MEDICINE CLINIC | Facility: CLINIC | Age: 67
End: 2017-07-21

## 2017-07-21 NOTE — TELEPHONE ENCOUNTER
LMOM to return my call. I advised patient to call [phone] along with office hours given.     Notes Recorded by Jaspreet Downey DO on 7/20/2017 at 5:01 PM CDT  Diabetes controlled.  Continue metformin.  Repeat labs in 6 months  Lipid normal  CMP dre

## 2017-07-24 ENCOUNTER — TELEPHONE (OUTPATIENT)
Dept: FAMILY MEDICINE CLINIC | Facility: CLINIC | Age: 67
End: 2017-07-24

## 2017-07-24 NOTE — TELEPHONE ENCOUNTER
Ivet from [place] states patient's diabetic supplies are not covered as pre-diabetes is not an accepted dx through [employer]. Directed to third party line for [employer].  Third party line Ivette Velazquez states that even with a medical necessity form medicare will

## 2017-07-24 NOTE — TELEPHONE ENCOUNTER
Spoke to patient and informed that diabetic supplies is not covered. Patient explains that she takes metformin and does lab work as requested, however, because she is not diagnosed with diabetes, supplies are not covered.  Patient plays to self- pay for sup

## 2017-07-25 ENCOUNTER — TELEPHONE (OUTPATIENT)
Dept: FAMILY MEDICINE CLINIC | Facility: CLINIC | Age: 67
End: 2017-07-25

## 2017-07-25 NOTE — TELEPHONE ENCOUNTER
Valorie West calling from [address] [facility], requesting ICD10 code for diabetic testing supplies. Informed Valorie West that pt is prediabetic and the office was advised that supplies are not covered for prediabetes.  Also received a fax requesting justification for junior

## 2017-08-05 DIAGNOSIS — E03.9 ACQUIRED HYPOTHYROIDISM: ICD-10-CM

## 2017-08-07 RX ORDER — LEVOTHYROXINE SODIUM 0.05 MG/1
TABLET ORAL
Qty: 90 TABLET | Refills: 0 | Status: SHIPPED | OUTPATIENT
Start: 2017-08-07 | End: 2018-01-30

## 2017-08-19 DIAGNOSIS — G43.709 CHRONIC MIGRAINE WITHOUT AURA WITHOUT STATUS MIGRAINOSUS, NOT INTRACTABLE: Primary | ICD-10-CM

## 2017-08-21 NOTE — TELEPHONE ENCOUNTER
Medication: Divalproex 500    Date of last refill: 1/10/17  [place]     Last office visit: 5/23/17  Due back to clinic per last office note:  3 months  Date next office visit scheduled:  8/24/17    Last OV note recommend

## 2017-08-23 RX ORDER — DIVALPROEX SODIUM 500 MG/1
TABLET, EXTENDED RELEASE ORAL
Qty: 60 TABLET | Refills: 0 | Status: SHIPPED | OUTPATIENT
Start: 2017-08-23 | End: 2017-09-22

## 2017-08-24 ENCOUNTER — TELEPHONE (OUTPATIENT)
Dept: FAMILY MEDICINE CLINIC | Facility: CLINIC | Age: 67
End: 2017-08-24

## 2017-08-24 NOTE — TELEPHONE ENCOUNTER
Patient called because she received a call to come in for a follow-up on her cholesterol and she said that she just recently had it tested and talked to someone here and they told her everything was good .   Does she still need to come in please call her at

## 2017-08-24 NOTE — TELEPHONE ENCOUNTER
Spoke to patient, patient received call from automated machine for an OV. Patient is due for OV in December after completing 6 month labs.     Future Appointments  Date Time Provider Seb Montgomery   12/22/2017 8:20 AM Kat Mccarty DO [facility] [place]

## 2017-09-01 ENCOUNTER — HOSPITAL ENCOUNTER (OUTPATIENT)
Age: 67
Discharge: HOME | End: 2017-09-01
Attending: FAMILY MEDICINE
Payer: MEDICARE

## 2017-09-01 ENCOUNTER — APPOINTMENT (OUTPATIENT)
Dept: GENERAL RADIOLOGY | Age: 67
End: 2017-09-01
Attending: FAMILY MEDICINE
Payer: MEDICARE

## 2017-09-01 ENCOUNTER — TELEPHONE (OUTPATIENT)
Dept: FAMILY MEDICINE CLINIC | Facility: CLINIC | Age: 67
End: 2017-09-01

## 2017-09-01 VITALS
HEART RATE: 98 BPM | BODY MASS INDEX: 34.99 KG/M2 | SYSTOLIC BLOOD PRESSURE: 137 MMHG | DIASTOLIC BLOOD PRESSURE: 66 MMHG | OXYGEN SATURATION: 96 % | RESPIRATION RATE: 16 BRPM | HEIGHT: 65 IN | WEIGHT: 210 LBS | TEMPERATURE: 96 F

## 2017-09-01 DIAGNOSIS — M25.461 KNEE EFFUSION, RIGHT: Primary | ICD-10-CM

## 2017-09-01 DIAGNOSIS — Z96.651 HISTORY OF ARTHROPLASTY OF RIGHT KNEE: ICD-10-CM

## 2017-09-01 DIAGNOSIS — S80.211A ABRASION OF RIGHT KNEE, INITIAL ENCOUNTER: ICD-10-CM

## 2017-09-01 PROCEDURE — 99213 OFFICE O/P EST LOW 20 MIN: CPT

## 2017-09-01 PROCEDURE — 73560 X-RAY EXAM OF KNEE 1 OR 2: CPT | Performed by: FAMILY MEDICINE

## 2017-09-01 PROCEDURE — 99203 OFFICE O/P NEW LOW 30 MIN: CPT

## 2017-09-01 NOTE — ED PROVIDER NOTES
[place] [address] [facility] [place]   Patient presents with:  Lower Extremity Injury (musculoskeletal)    Stated Complaint: fell on right knee yesterday    HPI  78 yo F here with R knee swelling and pain since yesterday since the daily.   Omeprazole 40 MG Oral Capsule Delayed Release,  Take 1 capsule (40 mg total) by mouth daily. In am after synthroid   ARIPiprazole 2 MG Oral Tab,  Take 2 mg by mouth every evening.    oxyCODONE-acetaminophen 5-325 MG Oral Tab,  Take 1 tablet by mout Smoking status: Never Smoker                                                              Smokeless tobacco: Never Used                      Alcohol use:  No                Review of Systems    Positive for stated complaint: fell on right knee yesterd Order Comments:              fell on right knee yesterday Yesi Asad on concrete stair running after grandson, abrasion noted on right               knee, able to move it but has pain.                         Order Specific Question: What is the Relevant Clinical Course  ------------------------------------------------------------  MDM       XR shows small amount of fluid in the knee - all  the knee hardware is in place and looks normal.     Tylenol 1 Gm every 6 hours for pain and swelling   Ace wrap of the knee re

## 2017-09-01 NOTE — TELEPHONE ENCOUNTER
Fell 2 days ago on R knee which has been replaced in 3/15. Pt fell directly on her R knee and scraped L elbow. Pt iced her knee for 2 days and she continues to have swelling and pain.  Pt state she is able to bear weight on her knee, but not much, she is us

## 2017-09-01 NOTE — ED INITIAL ASSESSMENT (HPI)
Camryn Rowell on concrete stair running after grandson, abrasion noted on right knee, able to move it but has pain.

## 2017-09-14 ENCOUNTER — PATIENT OUTREACH (OUTPATIENT)
Dept: FAMILY MEDICINE CLINIC | Facility: CLINIC | Age: 67
End: 2017-09-14

## 2017-09-14 NOTE — PROGRESS NOTES
Left VM for patient that she may receive flu shot at office. Provided office phone number for scheduling nurse visit.

## 2017-09-18 ENCOUNTER — TELEPHONE (OUTPATIENT)
Dept: FAMILY MEDICINE CLINIC | Facility: CLINIC | Age: 67
End: 2017-09-18

## 2017-09-18 NOTE — TELEPHONE ENCOUNTER
Patient was seen in Urgent Care 9/1/17 after falling and hitting knee. Patient has imaging done and was told knee replacement was not damaged, patient is still having pain and swelling and would like to be evaluated.      Future Appointments  Date Time Prov

## 2017-09-22 ENCOUNTER — OFFICE VISIT (OUTPATIENT)
Dept: FAMILY MEDICINE CLINIC | Facility: CLINIC | Age: 67
End: 2017-09-22

## 2017-09-22 VITALS
SYSTOLIC BLOOD PRESSURE: 98 MMHG | HEART RATE: 96 BPM | TEMPERATURE: 98 F | DIASTOLIC BLOOD PRESSURE: 60 MMHG | OXYGEN SATURATION: 98 % | WEIGHT: 213 LBS | BODY MASS INDEX: 35 KG/M2 | RESPIRATION RATE: 18 BRPM

## 2017-09-22 DIAGNOSIS — Z96.659 PAIN DUE TO TOTAL KNEE REPLACEMENT, SUBSEQUENT ENCOUNTER: Primary | ICD-10-CM

## 2017-09-22 DIAGNOSIS — S86.912A KNEE STRAIN, LEFT, INITIAL ENCOUNTER: ICD-10-CM

## 2017-09-22 DIAGNOSIS — J00 ACUTE NASOPHARYNGITIS: ICD-10-CM

## 2017-09-22 DIAGNOSIS — L03.90 CELLULITIS OF SKIN: ICD-10-CM

## 2017-09-22 DIAGNOSIS — T84.84XD PAIN DUE TO TOTAL KNEE REPLACEMENT, SUBSEQUENT ENCOUNTER: Primary | ICD-10-CM

## 2017-09-22 PROCEDURE — 90653 IIV ADJUVANT VACCINE IM: CPT | Performed by: FAMILY MEDICINE

## 2017-09-22 PROCEDURE — G0008 ADMIN INFLUENZA VIRUS VAC: HCPCS | Performed by: FAMILY MEDICINE

## 2017-09-22 PROCEDURE — 99214 OFFICE O/P EST MOD 30 MIN: CPT | Performed by: FAMILY MEDICINE

## 2017-09-22 RX ORDER — SERTRALINE HYDROCHLORIDE 25 MG/1
TABLET, FILM COATED ORAL
Refills: 0 | COMMUNITY
Start: 2017-08-03 | End: 2018-03-05 | Stop reason: DRUGHIGH

## 2017-09-22 RX ORDER — AMOXICILLIN AND CLAVULANATE POTASSIUM 875; 125 MG/1; MG/1
1 TABLET, FILM COATED ORAL 2 TIMES DAILY
Qty: 20 TABLET | Refills: 0 | Status: SHIPPED | OUTPATIENT
Start: 2017-09-22 | End: 2017-10-02

## 2017-09-22 NOTE — PATIENT INSTRUCTIONS
Use Tylenol arthritis 650 mg 1 tablet every 8 hours as needed for pain.   If fever or streaking redness from the knee or worse swelling or fever or chills or worse pain in the knee then must be reevaluated promptly, if over the weekend to then go to the imm · See your doctor for your cold if not resolved in 7-10 days sooner if symptoms worsen or change. Viral Syndrome (Adult)  A viral illness may cause a number of symptoms. The symptoms depend on the part of the body that the virus affects.  If it settles · Your appetite may be poor, so a light diet is fine. Avoid dehydration by drinking 8 to 12 8-ounce glasses of fluids each day.  This may include water; orange juice; lemonade; apple, grape, and cranberry juice; clear fruit drinks; electrolyte replacement a

## 2017-09-22 NOTE — PROGRESS NOTES
CHIEF COMPLAINT: Patient presents with:   Follow - Up: knee fall x3 weeks ago; bruising      HPI:     Peg Doherty is a 77year old female presents for follow-up fall and injury to right knee with history of status post knee replacement with 3 revisio same time  4/7/2017: COLONOSCOPY N/A      Comment: Procedure: COLONOSCOPY;  Surgeon: Roro Andrews MD;  Location: [facility] ENDOSCOPY  3/8/2015 : KNEE SURGERY  9/8/2015 : KNEE SURGERY      Comment: 3 knee surgeries, replacement and arthos total) by mouth 2 (two) times daily. Disp: 60 tablet Rfl: 6   Glucose Blood (ACCU-CHEK SMARTVIEW) In Vitro Strip Use to check blood sugar 1x daily. Disp: 100 strip Rfl: 1   Econazole Nitrate 1 % External Cream Apply 1 g topically 2 (two) times daily.  Disp: Tonsils are clear no exudates bilaterally. Moist mucous membranes. Cobblestoning present. Uvula midline. Red nasal turbinates  Neck: supple. No adenopathy. Heart: RRR without S3 or S4 murmur. Clear S1S2  Lungs: clear to auscultation bilaterally.  No rales HDL Cholesterol 07/19/2017 60    • LDL Cholesterol 07/19/2017 72    • VLDL 07/19/2017 15    • Chol/HDL Ratio 07/19/2017 2.45    • Non HDL Chol 07/19/2017 87    • Free T4 07/19/2017 0.9    • TSH 07/19/2017 1.960       REVIEWED THIS VISIT  ASSESSMENT/PLAN: syndrome     Obstructive sleep apnea syndrome     Irritable colon     Diverticulosis of large intestine without hemorrhage     Prediabetes     Acquired hypothyroidism     Benign essential HTN     Major depression in full remission ([facility] [address]     Chronic migrain

## 2017-09-25 ENCOUNTER — OFFICE VISIT (OUTPATIENT)
Dept: FAMILY MEDICINE CLINIC | Facility: CLINIC | Age: 67
End: 2017-09-25

## 2017-09-25 VITALS
HEART RATE: 94 BPM | SYSTOLIC BLOOD PRESSURE: 104 MMHG | TEMPERATURE: 99 F | WEIGHT: 211 LBS | DIASTOLIC BLOOD PRESSURE: 60 MMHG | RESPIRATION RATE: 18 BRPM | BODY MASS INDEX: 35 KG/M2 | OXYGEN SATURATION: 98 %

## 2017-09-25 DIAGNOSIS — L03.115 CELLULITIS OF KNEE, RIGHT: Primary | ICD-10-CM

## 2017-09-25 DIAGNOSIS — M25.561 ACUTE PAIN OF RIGHT KNEE: ICD-10-CM

## 2017-09-25 PROCEDURE — 99213 OFFICE O/P EST LOW 20 MIN: CPT | Performed by: FAMILY MEDICINE

## 2017-09-25 NOTE — PROGRESS NOTES
CHIEF COMPLAINT: No chief complaint on file.       HPI:     Robin Farias is a 77year old female presents for follow-up from last visit started on antibiotics for cellulitis over the inferior surface of her right knee patellar area status post fall an MD Tristan;  Location: [address] [place] [address] ENDOSCOPY  3/8/2015 : KNEE SURGERY  9/8/2015 : KNEE SURGERY      Comment: 3 knee surgeries, replacement and arthoscopy   12/2012: OTHER SURGICAL HISTORY      Comment: right arm carpal tunnel   Family History   Probl divalproex Sodium  MG Oral Tablet 24 Hr Take 1 tablet (500 mg total) by mouth 2 (two) times daily. Disp: 60 tablet Rfl: 6   Glucose Blood (ACCU-CHEK SMARTVIEW) In Vitro Strip Use to check blood sugar 1x daily.  Disp: 100 strip Rfl: 1   Econazole Nit Cobblestoning present. Uvula midline. Red nasal turbinates  Neck: supple. No adenopathy. Heart: RRR without S3 or S4 murmur. Clear S1S2  Lungs: clear to auscultation bilaterally. No rales, rhonchi or wheezes. Good inspiratory and expiratory effort.  No co • VLDL 07/19/2017 15    • Chol/HDL Ratio 07/19/2017 2.45    • Non HDL Chol 07/19/2017 87    • Free T4 07/19/2017 0.9    • TSH 07/19/2017 1.960       REVIEWED THIS VISIT  ASSESSMENT/PLAN:   77year old female with    1.  Pain due to total knee replacement, Obstructive sleep apnea syndrome     Irritable colon     Diverticulosis of large intestine without hemorrhage     Prediabetes     Acquired hypothyroidism     Benign essential HTN     Major depression in full remission (HCC)     Chronic migraine without aur

## 2017-09-28 DIAGNOSIS — G43.709 CHRONIC MIGRAINE WITHOUT AURA WITHOUT STATUS MIGRAINOSUS, NOT INTRACTABLE: Primary | ICD-10-CM

## 2017-09-28 NOTE — TELEPHONE ENCOUNTER
Patient overdue for appt. Left detailed message on confidential VM (ok per HIPAA) that appt is needed and that once scheduled would make sure she has enough medication to last until she comes into the office. Awaiting call back.      Medication: Depakote

## 2017-09-30 RX ORDER — DIVALPROEX SODIUM 500 MG/1
TABLET, EXTENDED RELEASE ORAL
Qty: 60 TABLET | Refills: 0 | Status: SHIPPED | OUTPATIENT
Start: 2017-09-30 | End: 2019-02-28

## 2017-10-08 DIAGNOSIS — E78.2 MIXED DYSLIPIDEMIA: ICD-10-CM

## 2017-10-09 RX ORDER — ATORVASTATIN CALCIUM 40 MG/1
TABLET, FILM COATED ORAL
Qty: 90 TABLET | Refills: 0 | Status: SHIPPED | OUTPATIENT
Start: 2017-10-09 | End: 2017-11-10

## 2017-10-14 ENCOUNTER — OFFICE VISIT (OUTPATIENT)
Dept: FAMILY MEDICINE CLINIC | Facility: CLINIC | Age: 67
End: 2017-10-14

## 2017-10-14 VITALS
HEIGHT: 64.5 IN | RESPIRATION RATE: 16 BRPM | OXYGEN SATURATION: 98 % | TEMPERATURE: 98 F | DIASTOLIC BLOOD PRESSURE: 66 MMHG | BODY MASS INDEX: 35.75 KG/M2 | SYSTOLIC BLOOD PRESSURE: 118 MMHG | HEART RATE: 76 BPM | WEIGHT: 212 LBS

## 2017-10-14 DIAGNOSIS — J01.40 ACUTE NON-RECURRENT PANSINUSITIS: Primary | ICD-10-CM

## 2017-10-14 PROCEDURE — 99213 OFFICE O/P EST LOW 20 MIN: CPT | Performed by: NURSE PRACTITIONER

## 2017-10-14 RX ORDER — CEFDINIR 300 MG/1
300 CAPSULE ORAL 2 TIMES DAILY
Qty: 20 CAPSULE | Refills: 0 | Status: SHIPPED | OUTPATIENT
Start: 2017-10-14 | End: 2017-10-24

## 2017-10-14 NOTE — PROGRESS NOTES
CHIEF COMPLAINT:   Patient presents with:  Sinus Problem: sinus pressure,cough,congestion sx x 2 week and half. HPI:   Nu Calderon is a 77year old female who presents for cold symptoms for  2  weeks.  Symptoms have progressed into sinus conges Calcium Carbonate-Vitamin D (OS-SHELBY 500 + D OR) Take 1 tablet by mouth 2 (two) times daily. Disp:  Rfl:    divalproex Sodium  MG Oral Tablet 24 Hr Take 1 tablet (500 mg total) by mouth 2 (two) times daily.  Disp: 60 tablet Rfl: 6   Glucose Blood (ACCU Comment: 3 knee surgeries, replacement and arthoscopy   12/2012: OTHER SURGICAL HISTORY      Comment: right arm carpal tunnel   Family History   Problem Relation Age of Onset   • Hypertension Father    • Hypertension Mother    • Lipids Mother    • Diab Ralph Morfin is a 77year old female who presents with Sinus Problem (sinus pressure,cough,congestion sx x 2 week and half. ).  Symptoms are consistent with:      ASSESSMENT:  Acute non-recurrent pansinusitis  (primary encounter diagnosis)      PLAN: · An expectorant containing guaifenesin may help thin the mucus and promote drainage from the sinuses. · Over-the-counter decongestants may be used unless a similar medicine was prescribed.  Nasal sprays work the fastest. Use one that contains phenylephrin © 0585-0746 The [address] [place] [address], [address]. All rights reserved. This information is not intended as a substitute for professional medical care. Always follow your healthcare professional's instructions.             The

## 2017-10-23 ENCOUNTER — OFFICE VISIT (OUTPATIENT)
Dept: NEUROLOGY | Facility: CLINIC | Age: 67
End: 2017-10-23

## 2017-10-23 VITALS
BODY MASS INDEX: 37 KG/M2 | HEART RATE: 88 BPM | WEIGHT: 220 LBS | SYSTOLIC BLOOD PRESSURE: 120 MMHG | DIASTOLIC BLOOD PRESSURE: 80 MMHG

## 2017-10-23 DIAGNOSIS — J30.9 ALLERGIC SINUSITIS: ICD-10-CM

## 2017-10-23 DIAGNOSIS — G43.709 CHRONIC MIGRAINE WITHOUT AURA WITHOUT STATUS MIGRAINOSUS, NOT INTRACTABLE: Primary | ICD-10-CM

## 2017-10-23 PROCEDURE — 99213 OFFICE O/P EST LOW 20 MIN: CPT | Performed by: OTHER

## 2017-10-23 NOTE — PATIENT INSTRUCTIONS
Refill policies:    • Allow 2-3 business days for refills; controlled substances may take longer.   • Contact your pharmacy at least 5 days prior to running out of medication and have them send an electronic request or submit request through the [facility] have a procedure or additional testing performed. [facility]) will contact your insurance carrier to obtain pre-certification or prior authorization.     Unfortunately, [facility] has seen an increase in denial of payment even though the p

## 2017-10-23 NOTE — PROGRESS NOTES
Joanna 1827   Neurology- follow up    Candance Sharp Defilippis Patient Status:  No patient class for patient encounter    1950 MRN [id number]   Location ED [facility], [address], [address] [place] [address] PCP Authur Gosselin, DO Monocytes Absolute      0.10-0.60 x10(3) uL  0.63 (H)   Eosinophils Absolute      0.00-0.30 x10(3) uL  0.09   Basophils Absolute      0.00-0.10 x10(3) uL  0.04   Immature Granulocyte Absolute      0.00-1.00 x10(3) uL  0.02   Neutrophils %        60.9   L ENDOSCOPY  3/8/2015 : KNEE SURGERY  9/8/2015 : KNEE SURGERY      Comment: 3 knee surgeries, replacement and arthoscopy   12/2012: OTHER SURGICAL HISTORY      Comment: right arm carpal tunnel    Family History:  family history includes Cancer in her paterna Carbonate-Vitamin D (OS-SHELBY 500 + D OR), Take 1 tablet by mouth 2 (two) times daily. , Disp: , Rfl:   •  Glucose Blood (ACCU-CHEK SMARTVIEW) In Vitro Strip, Use to check blood sugar 1x daily. , Disp: 100 strip, Rfl: 1  •  Econazole Nitrate 1 % External Cream rhythm  Lungs: Clear to auscultation bilaterally  Skin: There are no rashes or other skin lesions. Musculoskeletal: There is no scoliosis, or joint deformities  Neurologic examination:  Mental status: Patient is alert, attentive, and oriented x 3.  Vanice Brittle prescriptions requested or ordered in this encounter        We discussed in depth regarding the diagnosis, prognosis, treatment. The patient was given ample opportunity to ask questions. All questions and concerns were addressed.      DO Idalia Timmons

## 2017-10-31 DIAGNOSIS — G43.709 CHRONIC MIGRAINE WITHOUT AURA WITHOUT STATUS MIGRAINOSUS, NOT INTRACTABLE: Primary | ICD-10-CM

## 2017-10-31 RX ORDER — DIVALPROEX SODIUM 500 MG/1
TABLET, EXTENDED RELEASE ORAL
Qty: 60 TABLET | Refills: 4 | Status: SHIPPED | OUTPATIENT
Start: 2017-10-31 | End: 2017-12-22

## 2017-10-31 NOTE — TELEPHONE ENCOUNTER
Orders placed for CBC/CMP as written orders, per Dr. Felecia Carrillo note. Left detailed message on pts confidential voice mail (OK per HIPAA) relaying that Rx has been filled, and need for blood work. Directed patient to [email or web address]. [email or web address] for list of lab locati

## 2017-10-31 NOTE — TELEPHONE ENCOUNTER
Medication: Divalproex 500mg     Date of last refill: 9/30/17  [place]     Last office visit: 10/23/17  Due back to clinic per last office note:  19 weeks  Date next office visit scheduled:  3/5/18    Last OV note recomm

## 2017-11-10 DIAGNOSIS — E78.2 MIXED DYSLIPIDEMIA: ICD-10-CM

## 2017-11-10 RX ORDER — ATORVASTATIN CALCIUM 40 MG/1
TABLET, FILM COATED ORAL
Qty: 30 TABLET | Refills: 0 | Status: SHIPPED | OUTPATIENT
Start: 2017-11-10 | End: 2017-12-07

## 2017-11-10 NOTE — TELEPHONE ENCOUNTER
Pt requesting refill on Atorvastatin, protocol passed, refill approved    LOV 9/25/17    LF 10/9/17    Future Appointments  Date Time Provider Seb [place]   12/22/2017 8:20 AM Geronimo Sanchez DO [facility] 30 [facility] [place]   3/5/2018 2:40 PM Emelia Stroud,

## 2017-11-13 ENCOUNTER — TELEPHONE (OUTPATIENT)
Dept: FAMILY MEDICINE CLINIC | Facility: CLINIC | Age: 67
End: 2017-11-13

## 2017-11-13 NOTE — TELEPHONE ENCOUNTER
Pt recently saw  (neourologist),  asked pt to get some blood work done, Pt has an appt with  for her AWV on 12/22/2017, Pt is afraid that if she gets work done now and Dr Aida Jacob asks for any blood work on her upcoming pam

## 2017-11-14 NOTE — TELEPHONE ENCOUNTER
Spoke with pt, informed her that Dr Sheridan Castillo will not duplicate labs that Neuro has ordered. Pt may have all labs drawn together.  Pt agrees to this plan

## 2017-11-22 NOTE — TELEPHONE ENCOUNTER
Pt requesting refill on Metformin, protocol passed, refill approved    LOV 9/25/17    LF 10/2/17  #60    Future Appointments  Date Time Provider Seb [place]   12/22/2017 8:20 AM Marleny Greenberg DO [facility] 30 [facility] [place]   3/5/2018 2:40 PM Melida Bonilla

## 2017-12-07 DIAGNOSIS — E78.2 MIXED DYSLIPIDEMIA: ICD-10-CM

## 2017-12-07 RX ORDER — BLOOD SUGAR DIAGNOSTIC
STRIP MISCELLANEOUS
Qty: 100 STRIP | Refills: 0 | Status: SHIPPED | OUTPATIENT
Start: 2017-12-07 | End: 2018-03-05

## 2017-12-07 NOTE — TELEPHONE ENCOUNTER
Pt requesting refill on Test strips, protocol passed, refill approved    LOV 9/25/17    LF 12/27/16    Future Appointments  Date Time Provider Seb [place]   12/22/2017 8:20 AM Linda Gomez DO [facility] 30 [facility] [place]   3/5/2018 2:40 PM Mirta Monroy,

## 2017-12-08 RX ORDER — ATORVASTATIN CALCIUM 40 MG/1
TABLET, FILM COATED ORAL
Qty: 30 TABLET | Refills: 2 | Status: SHIPPED | OUTPATIENT
Start: 2017-12-08 | End: 2018-03-02

## 2017-12-08 NOTE — TELEPHONE ENCOUNTER
Refill request for atorvastatin, protocol passed, refill approved.      Future Appointments  Date Time Provider Seb [place]   12/22/2017 8:20 AM Marleny Greenberg DO [facility] 30 [facility] [place]   3/5/2018 2:40 PM DO AIMEE Smith EM[facility] [place]

## 2017-12-11 DIAGNOSIS — Z87.19 HISTORY OF GASTRITIS: ICD-10-CM

## 2017-12-11 DIAGNOSIS — I10 BENIGN ESSENTIAL HTN: ICD-10-CM

## 2017-12-11 RX ORDER — OMEPRAZOLE 40 MG/1
CAPSULE, DELAYED RELEASE ORAL
Qty: 90 CAPSULE | Refills: 0 | Status: SHIPPED | OUTPATIENT
Start: 2017-12-11 | End: 2018-03-13

## 2017-12-11 RX ORDER — LISINOPRIL 10 MG/1
TABLET ORAL
Qty: 90 TABLET | Refills: 0 | Status: SHIPPED | OUTPATIENT
Start: 2017-12-11 | End: 2018-03-13

## 2017-12-11 NOTE — TELEPHONE ENCOUNTER
Pt requesting refill of Omeprazole, no protocol, unable to approve:     Last Time Medication was Filled:  6/12/17 #90 w/1    Last Office Visit with PCP: 9/25/17    Future Appointments  Date Time Provider Seb [place]   12/22/2017 8:20 AM Jerry Holley

## 2017-12-11 NOTE — TELEPHONE ENCOUNTER
Pt requesting refill on Lisinopril, protocol passed, refill approved    LOV 9/25/17    LF 12/15/16 #90 w/1    Future Appointments  Date Time Provider Seb [place]   12/22/2017 8:20 AM Moncho Turpin, DO [facility] 30 [facility] [place]   3/5/2018 2:40 PM Diamond Hernandez

## 2017-12-22 ENCOUNTER — OFFICE VISIT (OUTPATIENT)
Dept: FAMILY MEDICINE CLINIC | Facility: CLINIC | Age: 67
End: 2017-12-22

## 2017-12-22 VITALS
OXYGEN SATURATION: 96 % | RESPIRATION RATE: 18 BRPM | TEMPERATURE: 98 F | BODY MASS INDEX: 47.46 KG/M2 | WEIGHT: 278 LBS | HEART RATE: 102 BPM | HEIGHT: 64 IN | SYSTOLIC BLOOD PRESSURE: 102 MMHG | DIASTOLIC BLOOD PRESSURE: 76 MMHG

## 2017-12-22 DIAGNOSIS — R53.83 OTHER FATIGUE: ICD-10-CM

## 2017-12-22 DIAGNOSIS — I10 BENIGN ESSENTIAL HTN: ICD-10-CM

## 2017-12-22 DIAGNOSIS — Z00.00 ENCOUNTER FOR ANNUAL HEALTH EXAMINATION: Primary | ICD-10-CM

## 2017-12-22 DIAGNOSIS — F33.1 MODERATE EPISODE OF RECURRENT MAJOR DEPRESSIVE DISORDER (HCC): ICD-10-CM

## 2017-12-22 DIAGNOSIS — E03.9 ACQUIRED HYPOTHYROIDISM: ICD-10-CM

## 2017-12-22 DIAGNOSIS — D36.7 NASAL DERMOID CYST: ICD-10-CM

## 2017-12-22 DIAGNOSIS — Z66 DNR (DO NOT RESUSCITATE): ICD-10-CM

## 2017-12-22 DIAGNOSIS — R73.01 IMPAIRED FASTING GLUCOSE: ICD-10-CM

## 2017-12-22 DIAGNOSIS — Z12.39 SCREENING FOR MALIGNANT NEOPLASM OF BREAST: ICD-10-CM

## 2017-12-22 DIAGNOSIS — E78.2 MIXED DYSLIPIDEMIA: ICD-10-CM

## 2017-12-22 PROCEDURE — 99214 OFFICE O/P EST MOD 30 MIN: CPT | Performed by: FAMILY MEDICINE

## 2017-12-22 PROCEDURE — G0438 PPPS, INITIAL VISIT: HCPCS | Performed by: FAMILY MEDICINE

## 2017-12-22 RX ORDER — BACITRACIN ZINC AND POLYMYXIN B SULFATE 500; 10000 [USP'U]/G; [USP'U]/G
OINTMENT OPHTHALMIC
Qty: 3.5 G | Refills: 1 | Status: SHIPPED | OUTPATIENT
Start: 2017-12-22 | End: 2018-01-15 | Stop reason: ALTCHOICE

## 2017-12-22 NOTE — PATIENT INSTRUCTIONS
Perform labs fasting 8 hours with water or black coffee or or black tea diet  soda only prior to exam.      Jean Pierre Parsons's SCREENING SCHEDULE   Tests on this list are recommended by your physician but may not be covered, or covered at this frequency, found for this or any previous visit.  Limited to patients who meet one of the following criteria:   • Men who are 73-68 years old and have smoked more than 100 cigarettes in their lifetime   • Anyone with a family history    Colorectal Cancer Screening  Co Please get this Mammogram regularly   Immunizations      Influenza  Covered Annually   Orders placed or performed in visit on 12/20/16  -FLU VACC PRSV FREE INC ANTIG    Please get every year    Pneumococcal 13 (Prevnar)  Covered Once after 65 No orders fou [facility] regarding Advance Directives. Preventing Falls: Making Changes in Luanne Sow [place] 1947  Is your living space filled with hazards that could cause you to fall? Changes can make you safer. They could even save your life.  Take a careful loo everything at once. Focus on one room at a time. The bathroom is a common spot for falls, so you may start there. Or start with a room you spend lots of time in, such as your bedroom. Make only a few changes at once.  This will give you time to adjust to th keep you safe  When you shop for shoes, keep these things in mind:  · Choose shoes with rubber or nonskid soles. Athletic shoes are a good choice. · Choose flats or shoes with low heels. Avoid high heels or platforms.   · All footwear should be sturdy and potTrice Imagingk or game of cards. · Garden with your neighbor. · Have a friend join you to go walking outdoors or in the mall. How exercise helps  The list of benefits from exercise just keeps getting longer. And, as you age, you can keep reaping those rewards. lighting in your home. Avoid using throw rugs, because they can raise your risk of tripping and falling. Add grab bars in the bathroom to help reduce the risk of falling. Small changes can make your home safer.  Talk with your healthcare provider about [facility] another one to try. You can do it anytime and almost anywhere. · Stand next to a counter or solid support. · Push yourself up onto your tiptoes. · Hold for 5 seconds. If you start to lose your balance, hold on to the counter. · Rest and repeat 5 times. member watch you walk every so often to check your stability. · Exercise with a friend. Choose an activity you both enjoy. · Consider hiren chi or yoga to strengthen your balance. · Try exercises that you can do anytime, anywhere. Here are 2 examples.  Hav problems? For example, do you need new glasses or hearing aids? · Do you have 2 or more long-lasting (chronic) medical conditions? · Do you take 3 or more medicines? · Have you felt depressed recently?   · Have you had more trouble with your memory in re

## 2017-12-22 NOTE — PROGRESS NOTES
HPI:   Neal Springer is a 79year old female who presents for a Medicare Subsequent Annual Wellness visit (Pt already had Initial Annual Wellness). Depression for several years. Symptoms worse after 's death.   Hospitalized 5 years ago at weeks. Believes started from cpap. Now using >4 hours nightly. Tried otc neosporin with no relief. No hx of MRSA. Pt presents for recheck of hyperlipidemia. Patient reports taking medications as instructed, no medication side effects noted.  Denies any g screening of functional status.    Difficulty walking?: Yes (stiffness)             Depression Screening (PHQ-2/PHQ-9): Over the LAST 2 WEEKS   Little interest or pleasure in doing things (over the last two weeks)?: More than half the days  Feeling down, de decisions. Will drop off copy. She has never smoked tobacco.    CAGE Alcohol screening   Anne Parsons was screened for Alcohol abuse and had a score of 0 so is at low risk.     Patient Care Team: Patient Care Team:  Claudine Valencia DO as PCP - Oral Tab Take by mouth.    Bacitracin-Polymyxin B 500-47001 UNIT/GM Ophthalmic Ointment Apply to affected side tid x 7 days   OMEPRAZOLE 40 MG Oral Capsule Delayed Release TAKE ONE CAPSULE BY MOUTH EVERY MORNING AFTER TAKING SYNTHROID   LISINOPRIL 10 MG Ora Sleep apnea; Thyroid condition; Unspecified essential hypertension; and Unspecified sleep apnea (2008).     She  has a past surgical history that includes appendectomy (1964); cholecystectomy (1993); other surgical history (12/2012); knee surgery (3/8/2015 Chart Acuity: 20/25   Both Eyes Visual Acuity: Uncorrected Both Eyes Chart Acuity: 20/25   Able To Tolerate Visual Acuity: Yes      General Appearance:  Alert, cooperative, no distress, appears stated age   Head:  Normocephalic, without obvious abnormality CLINIC 12/20/2016   • Influenza 09/21/2015   • Pneumococcal (Prevnar 13) 05/20/2016   • Pneumovax 23 03/12/2015, 12/20/2016   • TDAP 11/09/2016   • Zoster (Shingles) 11/09/2016        ASSESSMENT AND OTHER RELEVANT CHRONIC CONDITIONS:   Carlyn Parsons i affected side tid x 7 days   Small.  If worse, RTC immediately    Mixed dyslipidemia   Check lipids/cmp   Continue atorvastatin   Start walking 5x weekly x 30 mins     DNR (do not resuscitate)    Other orders    Diet assessment: good- on weight watcher vege Blood Annually No results found for: FOB No flowsheet data found. Glaucoma Screening      Ophthalmology Visit Annually: Diabetics, FHx Glaucoma, AA>50, > 65 No flowsheet data found.     Bone Density Screening      Dexascan Every two years Last Terril Dance Persistent     Medications (ACE/ARB, digoxin diuretics, anticonvulsants.)    Potassium  Annually Potassium (mmol/L)   Date Value   07/19/2017 4.7   05/24/2006 4.4    No flowsheet data found.     Creatinine  Annually Creatinine, Serum (mg/dL)   Date Value

## 2017-12-23 ENCOUNTER — LAB ENCOUNTER (OUTPATIENT)
Dept: LAB | Facility: HOSPITAL | Age: 67
End: 2017-12-23
Attending: FAMILY MEDICINE
Payer: MEDICARE

## 2017-12-23 DIAGNOSIS — E03.9 ACQUIRED HYPOTHYROIDISM: ICD-10-CM

## 2017-12-23 DIAGNOSIS — R53.83 OTHER FATIGUE: ICD-10-CM

## 2017-12-23 DIAGNOSIS — G43.709 CHRONIC MIGRAINE WITHOUT AURA WITHOUT STATUS MIGRAINOSUS, NOT INTRACTABLE: ICD-10-CM

## 2017-12-23 DIAGNOSIS — E78.2 MIXED DYSLIPIDEMIA: ICD-10-CM

## 2017-12-23 DIAGNOSIS — R73.01 IMPAIRED FASTING GLUCOSE: ICD-10-CM

## 2017-12-23 PROCEDURE — 36415 COLL VENOUS BLD VENIPUNCTURE: CPT

## 2017-12-23 PROCEDURE — 80053 COMPREHEN METABOLIC PANEL: CPT

## 2017-12-23 PROCEDURE — 82607 VITAMIN B-12: CPT

## 2017-12-23 PROCEDURE — 84443 ASSAY THYROID STIM HORMONE: CPT

## 2017-12-23 PROCEDURE — 85025 COMPLETE CBC W/AUTO DIFF WBC: CPT

## 2017-12-23 PROCEDURE — 83036 HEMOGLOBIN GLYCOSYLATED A1C: CPT

## 2017-12-23 PROCEDURE — 80061 LIPID PANEL: CPT

## 2017-12-23 PROCEDURE — 84439 ASSAY OF FREE THYROXINE: CPT

## 2018-01-15 ENCOUNTER — OFFICE VISIT (OUTPATIENT)
Dept: FAMILY MEDICINE CLINIC | Facility: CLINIC | Age: 68
End: 2018-01-15

## 2018-01-15 VITALS
DIASTOLIC BLOOD PRESSURE: 58 MMHG | TEMPERATURE: 98 F | WEIGHT: 224 LBS | SYSTOLIC BLOOD PRESSURE: 126 MMHG | BODY MASS INDEX: 38 KG/M2 | OXYGEN SATURATION: 99 % | RESPIRATION RATE: 16 BRPM | HEART RATE: 91 BPM

## 2018-01-15 DIAGNOSIS — J31.0 NONALLERGIC RHINITIS: ICD-10-CM

## 2018-01-15 DIAGNOSIS — E03.9 ACQUIRED HYPOTHYROIDISM: ICD-10-CM

## 2018-01-15 DIAGNOSIS — D72.810 LYMPHOCYTOPENIA: Primary | ICD-10-CM

## 2018-01-15 PROCEDURE — 99214 OFFICE O/P EST MOD 30 MIN: CPT | Performed by: FAMILY MEDICINE

## 2018-01-15 RX ORDER — LORATADINE 10 MG/1
10 TABLET ORAL DAILY
Qty: 365 TABLET | Refills: 0 | Status: SHIPPED | OUTPATIENT
Start: 2018-01-15 | End: 2018-03-12 | Stop reason: ALTCHOICE

## 2018-01-15 NOTE — PROGRESS NOTES
CHIEF COMPLAINT: Patient presents with: Follow - Up: labs, blood sugar checks         HPI:     [place] Door is a 79year old female presents for discuss labs. Some fatigue. Sleeps well.   Depression is okay waiting to see psychiatrist.  Sandro Guerrier vi History   Problem Relation Age of Onset   • Hypertension Father    • Hypertension Mother    • Lipids Mother    • Diabetes Maternal Grandmother    • Cancer Paternal Grandmother    • Hypertension Paternal Grandmother    • Obesity Paternal Grandmother    • He 1   Calcium Carbonate-Vitamin D (OS-SHELBY 500 + D OR) Take 1 tablet by mouth 2 (two) times daily. Disp:  Rfl:    Econazole Nitrate 1 % External Cream Apply 1 g topically 2 (two) times daily.  Disp: 85 g Rfl: 1   Montelukast Sodium 10 MG Oral Tab Take 10 mg by clubbing, or edema bilaterally   Skin: no acute  rashes    LABS     [facility] Lab Encounter on 12/23/2017   Component Date Value   • Cholesterol, Total 12/23/2017 146    • Triglycerides 12/23/2017 92    • HDL Cholesterol 12/23/2017 55    • LDL Cholesterol 12/23/ DIFFERENTIAL WITH PLATELET; Future    2. Acquired hypothyroidism  TSH is 3 and feeling some fatigue which is multifactorial.  B12 is normal.  And taking vitamin D. May be due to her depression.   Patient will be working with new psychiatrist.  recheck TSH without aura without status migrainosus, not intractable     Painful total knee replacement (HCC)     Biceps tendonitis     Impingement syndrome, shoulder, left     Bursitis, shoulder, left     History of colonoscopy with polypectomy     DNR (do not resusc

## 2018-01-15 NOTE — PATIENT INSTRUCTIONS
Nasal Allergy Medicines  The table below lists the most common over-the-counter (OTC) medicines for nasal allergies. Some are pills. Some are liquids. And, some are nasal sprays.  It's important to check with your healthcare provider or pharmacist before Date Last Reviewed: 9/1/2016  © 0911-2030 The [address] [facility], [address] [place]. All rights reserved. This information is not intended as a substitute for professional medical care.  Always follow your healthcare professional' Dust mites need moist air to live. Use a dehumidifier to reduce air moisture. Don’t use humidifiers, or vaporizers. Talk with your healthcare provider about other ways to reduce dust in your home.  Ask about medicines that can help with your allergy sympto

## 2018-01-17 ENCOUNTER — HOSPITAL ENCOUNTER (OUTPATIENT)
Dept: MAMMOGRAPHY | Age: 68
Discharge: HOME | End: 2018-01-17
Attending: FAMILY MEDICINE
Payer: MEDICARE

## 2018-01-17 DIAGNOSIS — Z12.39 SCREENING FOR MALIGNANT NEOPLASM OF BREAST: ICD-10-CM

## 2018-01-17 PROCEDURE — 77063 BREAST TOMOSYNTHESIS BI: CPT | Performed by: FAMILY MEDICINE

## 2018-01-17 PROCEDURE — 77067 SCR MAMMO BI INCL CAD: CPT | Performed by: FAMILY MEDICINE

## 2018-01-30 DIAGNOSIS — E03.9 ACQUIRED HYPOTHYROIDISM: ICD-10-CM

## 2018-01-30 RX ORDER — LEVOTHYROXINE SODIUM 0.05 MG/1
TABLET ORAL
Qty: 90 TABLET | Refills: 0 | Status: SHIPPED | OUTPATIENT
Start: 2018-01-30 | End: 2018-05-01

## 2018-02-22 ENCOUNTER — PATIENT OUTREACH (OUTPATIENT)
Dept: FAMILY MEDICINE CLINIC | Facility: CLINIC | Age: 68
End: 2018-02-22

## 2018-02-22 NOTE — PROGRESS NOTES
Left patient message to call office to schedule Medicare Wellness Visit. Last one done Initial on 12/22/2017.

## 2018-02-26 NOTE — TELEPHONE ENCOUNTER
Pt requesting refill on Metformin, protocol passed, refill approved    LOV 1/15/18    LF 11/22/17 #60 w/2    Future Appointments  Date Time Provider Seb Montgomery   3/5/2018 2:40 PM DO AIMEE Link

## 2018-02-28 RX ORDER — FLUTICASONE PROPIONATE 50 MCG
SPRAY, SUSPENSION (ML) NASAL
Qty: 3 BOTTLE | Refills: 0 | Status: SHIPPED | OUTPATIENT
Start: 2018-02-28 | End: 2018-06-30

## 2018-02-28 NOTE — TELEPHONE ENCOUNTER
Pt requesting refill on Fluticasone, protocol passed, refill approved    LOV 1/15/18    LF 3/7/17    Future Appointments  Date Time Provider Seb Montgomery   3/5/2018 2:40 PM Bob Hicks DO [facility] [place]   12/21/2018 12:20 PM ALBA Ashton

## 2018-03-02 DIAGNOSIS — E78.2 MIXED DYSLIPIDEMIA: ICD-10-CM

## 2018-03-02 RX ORDER — ATORVASTATIN CALCIUM 40 MG/1
TABLET, FILM COATED ORAL
Qty: 90 TABLET | Refills: 0 | Status: SHIPPED | OUTPATIENT
Start: 2018-03-02 | End: 2018-05-30

## 2018-03-02 NOTE — TELEPHONE ENCOUNTER
Pt requesting refill on Atorvastatin, protocol passed, refill approved    LOV 1/15/18    LF 12/8/17 #30 w/2    Future Appointments  Date Time Provider Seb Montgomery   3/5/2018 2:40 PM Lucho Cronin DO [facility] LORI [place]   12/21/2018 12:20 PM W

## 2018-03-05 ENCOUNTER — OFFICE VISIT (OUTPATIENT)
Dept: NEUROLOGY | Facility: CLINIC | Age: 68
End: 2018-03-05

## 2018-03-05 VITALS
DIASTOLIC BLOOD PRESSURE: 64 MMHG | SYSTOLIC BLOOD PRESSURE: 114 MMHG | HEART RATE: 104 BPM | WEIGHT: 232 LBS | BODY MASS INDEX: 40 KG/M2

## 2018-03-05 DIAGNOSIS — G43.709 CHRONIC MIGRAINE WITHOUT AURA WITHOUT STATUS MIGRAINOSUS, NOT INTRACTABLE: Primary | ICD-10-CM

## 2018-03-05 DIAGNOSIS — J30.9 ALLERGIC SINUSITIS: ICD-10-CM

## 2018-03-05 PROCEDURE — 99213 OFFICE O/P EST LOW 20 MIN: CPT | Performed by: OTHER

## 2018-03-05 RX ORDER — CLONAZEPAM 0.5 MG/1
TABLET ORAL
Refills: 0 | COMMUNITY
Start: 2018-02-12

## 2018-03-05 RX ORDER — LORAZEPAM 1 MG/1
TABLET ORAL
Refills: 0 | COMMUNITY
Start: 2018-02-03 | End: 2018-03-05 | Stop reason: DRUGHIGH

## 2018-03-05 NOTE — PATIENT INSTRUCTIONS
Refill policies:    • Allow 2-3 business days for refills; controlled substances may take longer.   • Contact your pharmacy at least 5 days prior to running out of medication and have them send an electronic request or submit request through the [facility] recommended that you have a procedure or additional testing performed. [facility]) will contact your insurance carrier to obtain pre-certification or prior authorization.     Unfortunately, [facility] has seen an increase in denial of paym

## 2018-03-05 NOTE — PROGRESS NOTES
Patient stated that her migraines has improved. Patient stated that in the last 6 months she has only had 1 migraine.

## 2018-03-05 NOTE — PROGRESS NOTES
Joanna 1827   Neurology- follow up    Tim Parsons Patient Status:  No patient class for patient encounter    1950 MRN [id number]   Location ED [facility], [address], [address] [place] [address] PCP Diane Serrano DO Lymphocytes Absolute      0.90 - 4.00 x10(3) uL 0.94   Monocytes Absolute      0.10 - 0.60 x10(3) uL 0.52   Eosinophils Absolute      0.00 - 0.30 x10(3) uL 0.21   Basophils Absolute      0.00 - 0.10 x10(3) uL 0.02   Immature Granulocyte Absolute      0.0 Nelson Coronado MD;  Location: [facility] ENDOSCOPY  3/8/2015 : KNEE SURGERY  9/8/2015 : KNEE SURGERY      Comment: 3 knee surgeries, replacement and arthoscopy   12/2012: OTHER SURGICAL HISTORY      Comment: right arm carpal tunnel    Family History:  family histor Detail Level: Detailed Rfl: 2  •  Calcium Carbonate-Vitamin D (OS-SHELBY 500 + D OR), Take 1 tablet by mouth 2 (two) times daily. , Disp: , Rfl:   •  Econazole Nitrate 1 % External Cream, Apply 1 g topically 2 (two) times daily. , Disp: 85 g, Rfl: 1  •  Montelukast Sodium 10 MG Oral Patient is alert, attentive, and oriented x 3. Language is coherent and fluent without aphasia. Memory, comprehension and ability to follow commands were intact. Cranial nerves II-XII: Optic discs were sharp. Pupils were round and reacted to light.  Extra

## 2018-03-07 ENCOUNTER — TELEPHONE (OUTPATIENT)
Dept: FAMILY MEDICINE CLINIC | Facility: CLINIC | Age: 68
End: 2018-03-07

## 2018-03-07 NOTE — TELEPHONE ENCOUNTER
Patient called stating that she received a letter saying she didn't complete her labs. She said she did and wanted to know if there were some out there that she didn't do yet.   Please give her a call at [phone]

## 2018-03-07 NOTE — TELEPHONE ENCOUNTER
Spoke with pt, informed her of the need to repeat thyroid labs.  Pt is due to repeat CBC in April and pt will complete both tests in April

## 2018-03-12 ENCOUNTER — OFFICE VISIT (OUTPATIENT)
Dept: FAMILY MEDICINE CLINIC | Facility: CLINIC | Age: 68
End: 2018-03-12

## 2018-03-12 VITALS
BODY MASS INDEX: 40 KG/M2 | TEMPERATURE: 98 F | SYSTOLIC BLOOD PRESSURE: 100 MMHG | WEIGHT: 231 LBS | DIASTOLIC BLOOD PRESSURE: 62 MMHG | RESPIRATION RATE: 18 BRPM | OXYGEN SATURATION: 98 % | HEART RATE: 50 BPM

## 2018-03-12 DIAGNOSIS — G89.29 CHRONIC PAIN OF RIGHT KNEE: ICD-10-CM

## 2018-03-12 DIAGNOSIS — Z96.651 PAIN DUE TO TOTAL RIGHT KNEE REPLACEMENT, SUBSEQUENT ENCOUNTER: ICD-10-CM

## 2018-03-12 DIAGNOSIS — M25.561 CHRONIC PAIN OF RIGHT KNEE: ICD-10-CM

## 2018-03-12 DIAGNOSIS — T84.84XD PAIN DUE TO TOTAL RIGHT KNEE REPLACEMENT, SUBSEQUENT ENCOUNTER: ICD-10-CM

## 2018-03-12 DIAGNOSIS — J40 BRONCHITIS: ICD-10-CM

## 2018-03-12 DIAGNOSIS — J01.21 ACUTE RECURRENT ETHMOIDAL SINUSITIS: Primary | ICD-10-CM

## 2018-03-12 PROCEDURE — 99214 OFFICE O/P EST MOD 30 MIN: CPT | Performed by: FAMILY MEDICINE

## 2018-03-12 RX ORDER — PREDNISONE 20 MG/1
40 TABLET ORAL DAILY
Qty: 7 TABLET | Refills: 0 | Status: SHIPPED | OUTPATIENT
Start: 2018-03-12 | End: 2018-03-19

## 2018-03-12 RX ORDER — INHALER, ASSIST DEVICES
SPACER (EA) MISCELLANEOUS
Refills: 0 | COMMUNITY
Start: 2018-03-06

## 2018-03-12 RX ORDER — AMOXICILLIN AND CLAVULANATE POTASSIUM 875; 125 MG/1; MG/1
1 TABLET, FILM COATED ORAL 2 TIMES DAILY
Qty: 20 TABLET | Refills: 0 | Status: SHIPPED | OUTPATIENT
Start: 2018-03-12 | End: 2018-03-22

## 2018-03-12 RX ORDER — AZITHROMYCIN 250 MG/1
TABLET, FILM COATED ORAL
Refills: 0 | COMMUNITY
Start: 2018-03-06 | End: 2018-07-18

## 2018-03-12 NOTE — PATIENT INSTRUCTIONS
·  Over –the-counter Mucinex DM 12 hour extended release or generic robutussin DM syrup will help cold symptoms  and does not affect blood pressure.   ·  Avoid pseudoephedrine or phenylephrine if you have heart problems or blood pressure issues like hyperte Your healthcare provider has told you that you have acute bronchitis. Bronchitis is infection or inflammation of the bronchial tubes (airways in the lungs). Normally, air moves easily in and out of the airways.  Bronchitis narrows the airways, making it jose · Drink plenty of fluids, such as water, juice, or warm soup. Fluids loosen mucus so that you can cough it up. This helps you breathe more easily. Fluids also prevent dehydration. · Make sure you get plenty of rest.  · Do not smoke.  Do not allow anyone el · Hold your breath for a count of 10, or as long as you can comfortably. · Then breathe out slowly through your mouth.   · Repeat these steps for each puff of medicine prescribed.             Important  · If the inhaler is being used for the first time or 7. Hold your breath for 10 seconds, or as long as you can comfortably hold your breath. Then breathe out slowly. 8. If you have been advised to take 2 puffs, wait 5 minutes, then repeat steps 3 to 7 above.  Waiting 5 minutes between puffs will allow the me If you find that your medicine is not working and you need to use it more often than prescribed, this could be a sign that your asthma is getting worse. Go to the emergency room or urgent care right away.  An asthma attack is easiest to treat in the early s

## 2018-03-12 NOTE — PROGRESS NOTES
CHIEF COMPLAINT: Patient presents with:  Cough: x2 weeks        HPI:     Peg Doherty is a 79year old female with hx of sleep apnea presents for dry cough x 2 weeks with nasal congestion, hx of fever tactile 4-5 days ago then diagnosed at [facility] COLONOSCOPY      Comment: [facility]- diverticulosis , polyps.  had EGD                same time  4/7/2017: COLONOSCOPY N/A      Comment: Procedure: COLONOSCOPY;  Surgeon: Tessa García MD;  Location: [facility] ENDOSCOPY  3/8/2015 : KNEE SURGERY  9 Tab Take 2 mg by mouth every evening. Disp:  Rfl:    BuPROPion HCl ER, XL, 300 MG Oral Tablet 24 Hr TK ONE T PO QAM Disp:  Rfl: 2   Calcium Carbonate-Vitamin D (OS-SHELBY 500 + D OR) Take 1 tablet by mouth 2 (two) times daily.  Disp:  Rfl:    Econazole Nitrate exudates bilaterally. Moist mucous membranes. Cobblestoning present. Uvula midline. Red nasal turbinates  Neck: supple. No adenopathy. Heart: RRR without S3 or S4 murmur. Clear S1S2  Lungs: clear to auscultation bilaterally. No rales, rhonchi or wheezes. 12/23/2017 2.06    • Lymphocyte Absolute 12/23/2017 0.94    • Monocyte Absolute 12/23/2017 0.52    • Eosinophil Absolute 12/23/2017 0.21    • Basophil Absolute 12/23/2017 0.02    • Immature Granulocyte Abs* 12/23/2017 0.02    • Neutrophil % 12/23/2017 54. 7 a result of today.      Problem List:   Patient Active Problem List:     Epigastric pain     Mixed dyslipidemia     History of gastritis     Left carpal tunnel syndrome     Obstructive sleep apnea syndrome     Irritable colon     Diverticulosis of large int

## 2018-03-13 DIAGNOSIS — Z87.19 HISTORY OF GASTRITIS: ICD-10-CM

## 2018-03-13 DIAGNOSIS — I10 BENIGN ESSENTIAL HTN: ICD-10-CM

## 2018-03-13 RX ORDER — LISINOPRIL 10 MG/1
TABLET ORAL
Qty: 90 TABLET | Refills: 0 | Status: SHIPPED | OUTPATIENT
Start: 2018-03-13 | End: 2018-06-11

## 2018-03-13 RX ORDER — OMEPRAZOLE 40 MG/1
CAPSULE, DELAYED RELEASE ORAL
Qty: 90 CAPSULE | Refills: 0 | Status: SHIPPED | OUTPATIENT
Start: 2018-03-13 | End: 2018-06-11

## 2018-03-13 NOTE — TELEPHONE ENCOUNTER
Pt requesting refill of Omeprazole, no protocol, unable to approve:     Last Time Medication was Filled:  12/11/17    Last Office Visit with PCP: 3/12/18    Future Appointments  Date Time Provider Seb Montgomery   12/21/2018 12:20 PM Rica Hall DO

## 2018-03-27 DIAGNOSIS — G43.709 CHRONIC MIGRAINE WITHOUT AURA WITHOUT STATUS MIGRAINOSUS, NOT INTRACTABLE: Primary | ICD-10-CM

## 2018-03-27 RX ORDER — DIVALPROEX SODIUM 500 MG/1
TABLET, EXTENDED RELEASE ORAL
Qty: 60 TABLET | Refills: 11 | Status: SHIPPED | OUTPATIENT
Start: 2018-03-27 | End: 2018-08-22

## 2018-03-27 NOTE — TELEPHONE ENCOUNTER
Medication: Divalproex    Date of last refill: 10/31/17 for #60/4 additional refills  [place]    Last office visit: 3/5/18  Due back to clinic per last office note:  1 year  Date next office visit scheduled:  not yet

## 2018-04-02 RX ORDER — FLUTICASONE PROPIONATE 50 MCG
SPRAY, SUSPENSION (ML) NASAL
Refills: 0 | OUTPATIENT
Start: 2018-04-02

## 2018-04-02 NOTE — TELEPHONE ENCOUNTER
Last refill - 2/28/18 - #3  Last office visit- 3/12/18  Called pharmacy. Patient has refills available.

## 2018-05-01 DIAGNOSIS — E03.9 ACQUIRED HYPOTHYROIDISM: ICD-10-CM

## 2018-05-01 RX ORDER — LEVOTHYROXINE SODIUM 0.05 MG/1
TABLET ORAL
Qty: 90 TABLET | Refills: 0 | Status: SHIPPED | OUTPATIENT
Start: 2018-05-01 | End: 2018-07-18

## 2018-05-01 RX ORDER — LANCETS
EACH MISCELLANEOUS
Refills: 2 | COMMUNITY
Start: 2018-04-17 | End: 2018-07-14

## 2018-05-01 NOTE — TELEPHONE ENCOUNTER
Pt requesting refills on Levothyroxine and Metformin, protocols passed, refills approved    LOV 3/12/18    LF 1/30/18 and 2/26/18    Future Appointments  Date Time Provider Seb Montgomery   12/21/2018 12:20 PM Jerry Holley DO [facility] [place]

## 2018-05-30 DIAGNOSIS — E78.2 MIXED DYSLIPIDEMIA: ICD-10-CM

## 2018-05-30 RX ORDER — ATORVASTATIN CALCIUM 40 MG/1
TABLET, FILM COATED ORAL
Qty: 90 TABLET | Refills: 0 | Status: SHIPPED | OUTPATIENT
Start: 2018-05-30 | End: 2018-07-18

## 2018-05-30 RX ORDER — SERTRALINE HYDROCHLORIDE 100 MG/1
TABLET, FILM COATED ORAL
Refills: 0 | COMMUNITY
Start: 2018-05-15

## 2018-05-30 NOTE — TELEPHONE ENCOUNTER
Pt requesting refill on Atorvastatin, protocol passed, refill approved    LOV 3/12/18    LF 3/2/18    Future Appointments  Date Time Provider Seb Montgomery   12/21/2018 12:20 PM Bianca Chew DO [facility] [place]

## 2018-06-11 DIAGNOSIS — Z87.19 HISTORY OF GASTRITIS: ICD-10-CM

## 2018-06-11 DIAGNOSIS — I10 BENIGN ESSENTIAL HTN: ICD-10-CM

## 2018-06-11 RX ORDER — LISINOPRIL 10 MG/1
TABLET ORAL
Qty: 90 TABLET | Refills: 0 | Status: SHIPPED | OUTPATIENT
Start: 2018-06-11 | End: 2018-07-18

## 2018-06-11 NOTE — TELEPHONE ENCOUNTER
Pt requesting refill of Omeprazole, no protocol  unable to approve:     Last Time Medication was Filled:  3/13/18    Last Office Visit with PCP: 3/12/18    Future Appointments  Date Time Provider Seb Montgomery   12/21/2018 12:20 PM DO SONIA Casas

## 2018-06-12 RX ORDER — OMEPRAZOLE 40 MG/1
CAPSULE, DELAYED RELEASE ORAL
Qty: 90 CAPSULE | Refills: 0 | Status: SHIPPED | OUTPATIENT
Start: 2018-06-12 | End: 2018-09-08

## 2018-06-28 ENCOUNTER — TELEPHONE (OUTPATIENT)
Dept: FAMILY MEDICINE CLINIC | Facility: CLINIC | Age: 68
End: 2018-06-28

## 2018-06-28 NOTE — TELEPHONE ENCOUNTER
LMOM to return call to the office. Provided pt office phone [phone] along with office hours given.     Pt needs lab appointment and follow up    Paperwork for cpap supplies in triage

## 2018-07-02 RX ORDER — FLUTICASONE PROPIONATE 50 MCG
SPRAY, SUSPENSION (ML) NASAL
Qty: 3 BOTTLE | Refills: 0 | Status: SHIPPED | OUTPATIENT
Start: 2018-07-02 | End: 2018-08-02

## 2018-07-02 NOTE — TELEPHONE ENCOUNTER
LMOM to return call to the office as this is my 2nd attempt to try to reach you. Provided pt office phone [phone] along with office hours given.

## 2018-07-02 NOTE — TELEPHONE ENCOUNTER
Pt requesting refill on Fluticasone, protocol passed, refill approved    LOV 3/12/18    LF 2/28/18 #3    Future Appointments  Date Time Provider Seb Montgomery   12/21/2018 12:20 PM Arelis Keene,  [facility] [place]

## 2018-07-05 NOTE — TELEPHONE ENCOUNTER
Spoke with patient. Informed her of outstanding Tea Baum and need for follow-up. Pt stated she would be going to hospital lab this upcoming Saturday. Pt also made a follow-up appointment.  Informed patient she has has paperfork for cpap that needs to be comp

## 2018-07-07 ENCOUNTER — LAB ENCOUNTER (OUTPATIENT)
Dept: LAB | Facility: HOSPITAL | Age: 68
End: 2018-07-07
Attending: FAMILY MEDICINE
Payer: MEDICARE

## 2018-07-07 DIAGNOSIS — D72.810 LYMPHOCYTOPENIA: ICD-10-CM

## 2018-07-07 DIAGNOSIS — E03.9 ACQUIRED HYPOTHYROIDISM: ICD-10-CM

## 2018-07-07 LAB
BASOPHILS # BLD AUTO: 0.03 X10(3) UL (ref 0–0.1)
BASOPHILS NFR BLD AUTO: 0.5 %
EOSINOPHIL # BLD AUTO: 0.21 X10(3) UL (ref 0–0.3)
EOSINOPHIL NFR BLD AUTO: 3.6 %
ERYTHROCYTE [DISTWIDTH] IN BLOOD BY AUTOMATED COUNT: 13.3 % (ref 11.5–16)
FREE T4: 0.8 NG/DL (ref 0.9–1.8)
HCT VFR BLD AUTO: 41.5 % (ref 34–50)
HGB BLD-MCNC: 13 G/DL (ref 12–16)
IMMATURE GRANULOCYTE COUNT: 0.04 X10(3) UL (ref 0–1)
IMMATURE GRANULOCYTE RATIO %: 0.7 %
LYMPHOCYTES # BLD AUTO: 2.27 X10(3) UL (ref 0.9–4)
LYMPHOCYTES NFR BLD AUTO: 38.4 %
MCH RBC QN AUTO: 27.5 PG (ref 27–33.2)
MCHC RBC AUTO-ENTMCNC: 31.3 G/DL (ref 31–37)
MCV RBC AUTO: 87.9 FL (ref 81–100)
MONOCYTES # BLD AUTO: 0.44 X10(3) UL (ref 0.1–1)
MONOCYTES NFR BLD AUTO: 7.4 %
NEUTROPHIL ABS PRELIM: 2.92 X10 (3) UL (ref 1.3–6.7)
NEUTROPHILS # BLD AUTO: 2.92 X10(3) UL (ref 1.3–6.7)
NEUTROPHILS NFR BLD AUTO: 49.4 %
PLATELET # BLD AUTO: 262 10(3)UL (ref 150–450)
RBC # BLD AUTO: 4.72 X10(6)UL (ref 3.8–5.1)
RED CELL DISTRIBUTION WIDTH-SD: 43.4 FL (ref 35.1–46.3)
TSI SER-ACNC: 2.44 MIU/ML (ref 0.35–5.5)
WBC # BLD AUTO: 5.9 X10(3) UL (ref 4–13)

## 2018-07-07 PROCEDURE — 84439 ASSAY OF FREE THYROXINE: CPT

## 2018-07-07 PROCEDURE — 36415 COLL VENOUS BLD VENIPUNCTURE: CPT

## 2018-07-07 PROCEDURE — 84443 ASSAY THYROID STIM HORMONE: CPT

## 2018-07-07 PROCEDURE — 85025 COMPLETE CBC W/AUTO DIFF WBC: CPT

## 2018-07-16 RX ORDER — LANCETS
EACH MISCELLANEOUS
Qty: 102 EACH | Refills: 0 | Status: SHIPPED | OUTPATIENT
Start: 2018-07-16 | End: 2019-01-11

## 2018-07-16 NOTE — TELEPHONE ENCOUNTER
Pt requesting a refill of Lancets, protocol passed. Refill approved.     LOV 3/12/18  LF 4/17/18 historical    Future Appointments  Date Time Provider Seb Montgomery   7/18/2018 9:40 AM Aries Alexander DO [facility] [place]   12/21/2018 12:20 PM ALBA Alvarado

## 2018-07-18 ENCOUNTER — OFFICE VISIT (OUTPATIENT)
Dept: FAMILY MEDICINE CLINIC | Facility: CLINIC | Age: 68
End: 2018-07-18
Payer: MEDICARE

## 2018-07-18 VITALS
RESPIRATION RATE: 16 BRPM | OXYGEN SATURATION: 98 % | SYSTOLIC BLOOD PRESSURE: 112 MMHG | DIASTOLIC BLOOD PRESSURE: 76 MMHG | TEMPERATURE: 98 F | WEIGHT: 236.69 LBS | BODY MASS INDEX: 41.42 KG/M2 | HEIGHT: 63.25 IN | HEART RATE: 85 BPM

## 2018-07-18 DIAGNOSIS — R19.7 DIARRHEA, UNSPECIFIED TYPE: ICD-10-CM

## 2018-07-18 DIAGNOSIS — R73.03 PREDIABETES: Primary | ICD-10-CM

## 2018-07-18 DIAGNOSIS — I10 BENIGN ESSENTIAL HTN: ICD-10-CM

## 2018-07-18 DIAGNOSIS — G47.33 OBSTRUCTIVE SLEEP APNEA SYNDROME: ICD-10-CM

## 2018-07-18 DIAGNOSIS — E03.9 ACQUIRED HYPOTHYROIDISM: ICD-10-CM

## 2018-07-18 DIAGNOSIS — E78.2 MIXED DYSLIPIDEMIA: ICD-10-CM

## 2018-07-18 PROCEDURE — 99214 OFFICE O/P EST MOD 30 MIN: CPT | Performed by: FAMILY MEDICINE

## 2018-07-18 RX ORDER — LISINOPRIL 10 MG/1
TABLET ORAL
Qty: 90 TABLET | Refills: 1 | Status: SHIPPED | OUTPATIENT
Start: 2018-07-18 | End: 2019-01-11

## 2018-07-18 RX ORDER — LEVOTHYROXINE SODIUM 0.05 MG/1
50 TABLET ORAL
Qty: 90 TABLET | Refills: 3 | Status: SHIPPED | OUTPATIENT
Start: 2018-07-18 | End: 2018-11-09

## 2018-07-18 RX ORDER — ATORVASTATIN CALCIUM 40 MG/1
TABLET, FILM COATED ORAL
Qty: 90 TABLET | Refills: 1 | Status: SHIPPED | OUTPATIENT
Start: 2018-07-18 | End: 2019-01-11

## 2018-07-18 NOTE — PROGRESS NOTES
CHIEF COMPLAINT: Patient presents with: Follow - Up: labs, YVONNE forms      HPI:     Ovi Mauricio is a 79year old female presents for monitoring of obstructive sleep apnea. Diagnosed 9 years ago at [facility] seen pulmonologist initially. effects, no home BP monitoring. Pt presents for recheck of hyperlipidemia. Patient reports taking medications as instructed, no medication side effects noted. Denies any generalized muscle aches.     HISTORY:  Past Medical History:   Diagnosis Date   • A EVERY DAY Disp: 102 each Rfl: 0   FLUTICASONE PROPIONATE 50 MCG/ACT Nasal Suspension INHALE 2 SPRAYS IN EACH NOSTRIL EVERY DAY Disp: 3 Bottle Rfl: 0   OMEPRAZOLE 40 MG Oral Capsule Delayed Release TAKE ONE CAPSULE BY MOUTH EVERY MORNING AFTER TAKING SYNTHR mouth 2 (two) times daily. Indications: Feeling Anxious Disp:  Rfl:    Cholecalciferol (VITAMIN D3) 1000 UNITS Oral Cap Take 2,000 Units by mouth daily.    Disp:  Rfl:        Allergies:    Sulfa Antibiotics       UNKNOWN    Comment:Carried over from [facility] 13.0    • HCT 07/07/2018 41.5    • PLT 07/07/2018 262.0    • MCV 07/07/2018 87.9    • MCH 07/07/2018 27.5    • MCHC 07/07/2018 31.3    • RDW 07/07/2018 13.3    • RDW-SD 07/07/2018 43.4    • Neutrophil Absolute Prel* 07/07/2018 2.92    • Neutrophil Absolute diet  - lisinopril 10 MG Oral Tab; TAKE 1 TABLET(10 MG) BY MOUTH DAILY  Dispense: 90 tablet; Refill: 1  - COMP METABOLIC PANEL (14); Future    6.  Diarrhea, unspecified type  Rule out infection, C. difficile, diverticulitis and IBS remains in the differenti apnea syndrome     Irritable colon     Diverticulosis of large intestine without hemorrhage     Prediabetes     Acquired hypothyroidism     Benign essential HTN     Major depression in full remission (HCC)     Chronic migraine without aura without status m

## 2018-07-18 NOTE — PATIENT INSTRUCTIONS
Clear liquid diet for 3 days to see if helps and may eat low residue diet for the next week. If symptoms have not improved follow-up in 1 week if unable to be seen by gastroenterology. Sooner if worse.   Diarrhea with Uncertain Cause (Adult)    Diarrhea · You may use acetaminophen or NSAID medicines like ibuprofen or naproxen to reduce pain and fever. Don’t use these if you have chronic liver or kidney disease, or ever had a stomach ulcer or gastrointestinal bleeding.  Don't use NSAID medicines if you are · Don’t force yourself to eat, especially if you are having cramping, vomiting, or diarrhea. Don’t eat large amounts at a time, even if you are hungry. · If you eat, avoid fatty, greasy, spicy, or fried foods.   · Don’t eat dairy foods or drink milk if you · Abdominal pain that gets worse  · Constant lower right abdominal pain  · Continued vomiting and inability to keep liquids down  · Diarrhea more than 5 times a day  · Blood in vomit or stool  · Dark urine or no urine for 8 hours, dry mouth and tongue, tir The exact cause of prediabetes is not clear. But certain risk factors make a person more likely to have it.  These include:  · A family history of type 2 diabetes  · Being overweight  · Being over age 39  · Have hypertension or elevated cholesterol   · Havi The best way to treat prediabetes is to lose at least 5% to 7% of your current weight and be more physically active by getting at least 150 minutes a week of physical activity.  When sitting for long periods of time, get up for short sessions of light activ A meal plan gives guidelines for the types and amounts of food you should eat. The goal is to balance food and insulin (or other diabetes medications) so your blood sugars will be in your target range.  Your dietitian will help you make a flexible meal plan This includes most fruit juices, which are often high in natural or added sugar. Instead, drink plenty of water and other sugar-free beverages. Eat less fat  If you need to lose weight, try to reduce the amount of fat in your diet.  This can also help lowe Keep track of the amount of carbohydrates you eat. This can help you keep the right balance of physical activity and medicine. The amount of carbohydrates needed will vary for each person.  It depends on many things such as your health, the medicines you ta The amount of food you eat affects your blood sugar. It also affects your weight. Your healthcare team will tell you how much of each type of food you should eat. · Use measuring cups and spoons and a food scale to measure serving sizes.   · Learn what a c Portion sizes are important to controlling your blood sugar and staying at a healthy weight. Stock up on healthy snack items so you always have them on hand. When to eat  Your meal plan will likely include breakfast, lunch, dinner, and some snacks.   · Try In general, a low-fiber diet means having fewer than 13 grams of fiber a day. Your healthcare provider may give you a list of things you can and can’t eat or drink. Read food labels.  Choose foods and drinks that have as close to zero grams of fiber as poss Meats and protein (4 to 6 ounces daily)  · What to choose: tender, well-cooked meat, including ground meat, poultry, and fish; eggs; tofu; creamy peanut butter  · What to avoid: tough, chewy meat with gristle; peas, including split, yellow, and black-eyed; Starches, sugars, and fiber are all types of carbohydrates. Fiber can help lower your cholesterol and triglycerides. Fiber is also healthy for your heart. You should have 20 to 35 grams of total fiber each day.  Fiber-rich foods include:  · Whole-grain nia Saturated and trans fats are unhealthy for your heart. They raise LDL (bad) cholesterol. Fat is also high in calories, so it can make you gain weight.  To cut down on unhealthy fats and sugar, limit these foods:  · Butter or margarine  · Palm and palm kerne · 1/3 cup of hummus plus 1 cup of fresh cut nonstarchy vegetables (carrots, green peppers, broccoli, celery, or a combination)  · 1/2 cup of fresh or canned fruit plus 1/4 cup of cottage cheese  · 1/2 cup of tuna salad with 4 crackers  · 2 rice cakes and a

## 2018-07-30 DIAGNOSIS — I10 BENIGN ESSENTIAL HTN: ICD-10-CM

## 2018-07-30 RX ORDER — LISINOPRIL 10 MG/1
TABLET ORAL
Qty: 90 TABLET | Refills: 0 | OUTPATIENT
Start: 2018-07-30

## 2018-07-30 NOTE — TELEPHONE ENCOUNTER
Pt requesting refill of Metformin, protocol failed, unable to approve:     Last Time Medication was Filled:  7/18/18    Last Office Visit with PCP: 7/18/18    Future Appointments  Date Time Provider Seb Montgomery   12/21/2018 12:20 PM CATRACHITO Molina

## 2018-08-02 RX ORDER — FLUTICASONE PROPIONATE 50 MCG
SPRAY, SUSPENSION (ML) NASAL
Qty: 16 G | Refills: 0 | Status: SHIPPED | OUTPATIENT
Start: 2018-08-02 | End: 2018-08-02

## 2018-08-02 RX ORDER — FLUTICASONE PROPIONATE 50 MCG
SPRAY, SUSPENSION (ML) NASAL
Qty: 3 BOTTLE | Refills: 0 | Status: SHIPPED | OUTPATIENT
Start: 2018-08-02 | End: 2018-10-29

## 2018-08-02 NOTE — TELEPHONE ENCOUNTER
Pt requesting a refill of fluticasone, protocol passed. Refill approved.     LOV 7/18/18  LF 7/2/18 #3 bottles    Future Appointments  Date Time Provider Seb Montgomery   12/21/2018 12:20 PM Catalina Jamison,  [facility] [place]

## 2018-08-11 ENCOUNTER — HOSPITAL ENCOUNTER (OUTPATIENT)
Age: 68
Discharge: HOME | End: 2018-08-11
Payer: MEDICARE

## 2018-08-11 ENCOUNTER — APPOINTMENT (OUTPATIENT)
Dept: GENERAL RADIOLOGY | Age: 68
End: 2018-08-11
Attending: NURSE PRACTITIONER
Payer: MEDICARE

## 2018-08-11 VITALS
WEIGHT: 235 LBS | HEART RATE: 89 BPM | SYSTOLIC BLOOD PRESSURE: 129 MMHG | DIASTOLIC BLOOD PRESSURE: 79 MMHG | TEMPERATURE: 98 F | OXYGEN SATURATION: 97 % | BODY MASS INDEX: 39.15 KG/M2 | HEIGHT: 65 IN | RESPIRATION RATE: 16 BRPM

## 2018-08-11 DIAGNOSIS — M25.562 ACUTE PAIN OF LEFT KNEE: Primary | ICD-10-CM

## 2018-08-11 DIAGNOSIS — M25.40 JOINT EFFUSION: ICD-10-CM

## 2018-08-11 PROCEDURE — 73560 X-RAY EXAM OF KNEE 1 OR 2: CPT | Performed by: NURSE PRACTITIONER

## 2018-08-11 PROCEDURE — 99213 OFFICE O/P EST LOW 20 MIN: CPT

## 2018-08-11 RX ORDER — ACETAMINOPHEN 500 MG
1000 TABLET ORAL ONCE
Status: COMPLETED | OUTPATIENT
Start: 2018-08-11 | End: 2018-08-11

## 2018-08-11 NOTE — ED PROVIDER NOTES
Patient Seen in: [address] [facility]    History   Patient presents with:  Musculoskeletal Problem    Stated Complaint: Left Knee Pain    71-year-old female presents today with increased swelling and pain to the left knee.   Does have history o ENDOSCOPY  No date: KNEE REPLACEMENT SURGERY      Comment: to the right knee only  3/8/2015 : KNEE SURGERY  9/8/2015 : KNEE SURGERY      Comment: 3 knee surgeries, replacement and arthoscopy   12/2012: OTHER SURGICAL HISTORY      Comment: right arm carpal VIEWS), LEFT (CPT=73560)  COMPARISON:  [address], XR KNEE (1 OR 2 VIEWS), RIGHT (CPT=73560), 9/01/2017, 12:33.   INDICATIONS:  Left Knee Pain  PATIENT STATED HISTORY: (As transcribed by Technologist)  Patient states left posterior knee pa

## 2018-08-11 NOTE — ED INITIAL ASSESSMENT (HPI)
Patient went to [facility] to visit her daughter and was feeling fine after [place] airports [place]. However on Monday she started with left knee pain. Her knee is swollen.  She felt better when in the pool at there daughter's house or icing th

## 2018-08-22 ENCOUNTER — HOSPITAL ENCOUNTER (OUTPATIENT)
Dept: ULTRASOUND IMAGING | Age: 68
Discharge: HOME | End: 2018-08-22
Attending: FAMILY MEDICINE
Payer: MEDICARE

## 2018-08-22 ENCOUNTER — OFFICE VISIT (OUTPATIENT)
Dept: FAMILY MEDICINE CLINIC | Facility: CLINIC | Age: 68
End: 2018-08-22
Payer: MEDICARE

## 2018-08-22 ENCOUNTER — TELEPHONE (OUTPATIENT)
Dept: FAMILY MEDICINE CLINIC | Facility: CLINIC | Age: 68
End: 2018-08-22

## 2018-08-22 VITALS
RESPIRATION RATE: 18 BRPM | OXYGEN SATURATION: 99 % | BODY MASS INDEX: 41 KG/M2 | HEART RATE: 77 BPM | WEIGHT: 244 LBS | DIASTOLIC BLOOD PRESSURE: 60 MMHG | SYSTOLIC BLOOD PRESSURE: 104 MMHG | TEMPERATURE: 98 F

## 2018-08-22 DIAGNOSIS — M79.662 PAIN OF LEFT CALF: ICD-10-CM

## 2018-08-22 DIAGNOSIS — Z78.9 HISTORY OF RECENT AIR TRAVEL: ICD-10-CM

## 2018-08-22 DIAGNOSIS — M17.12 PRIMARY OSTEOARTHRITIS OF LEFT KNEE: ICD-10-CM

## 2018-08-22 DIAGNOSIS — I82.442 ACUTE DEEP VEIN THROMBOSIS (DVT) OF TIBIAL VEIN OF LEFT LOWER EXTREMITY (HCC): ICD-10-CM

## 2018-08-22 DIAGNOSIS — M25.562 ACUTE PAIN OF LEFT KNEE: Primary | ICD-10-CM

## 2018-08-22 PROCEDURE — 93971 EXTREMITY STUDY: CPT | Performed by: FAMILY MEDICINE

## 2018-08-22 PROCEDURE — 99215 OFFICE O/P EST HI 40 MIN: CPT | Performed by: FAMILY MEDICINE

## 2018-08-22 NOTE — PROGRESS NOTES
CHIEF COMPLAINT: Patient presents with:  Urgent Care F/u: knee pain    HPI:     Car Yun is a 79year old female presents for left knee pain ×2 weeks insidious onset.   Worse with walking and partially relieved with sitting and swimming in the poo diverticulosis , polyps.  had EGD                same time  4/7/2017: COLONOSCOPY N/A      Comment: Procedure: COLONOSCOPY;  Surgeon: Faye Townsend MD;  Location: [facility] ENDOSCOPY  No date: KNEE REPLACEMENT SURGERY      Comment: to the right kn as directed Disp:  Rfl: 0   ClonazePAM 0.5 MG Oral Tab TK SS T PO BID UTD Disp:  Rfl: 0   Cranberry 125 MG Oral Tab Take by mouth.  Disp:  Rfl:    DIVALPROEX SODIUM  MG Oral Tablet 24 Hr TAKE 1 TABLET BY MOUTH TWICE DAILY Disp: 60 tablet Rfl: 0   ARIP Well-nourished, well hydrated. No acute distress. No pallor. No tachypnea. Non toxic. HEENT: normocephaly, atraumatic. Sclera clear and non icteric bilaterally. Oral pharynx clear without normal finding. Moist mucous membranes. Neck: supple.  No adenop enthesophytes. Patellar osteophytes are noted. IMPRESSION:  Moderate patellofemoral osteoarthritic changes. Overall moderate osteoarthritic changes.     Dictated by: Judi Robertson MD on 8/11/2018 at 15:03     Approved by: Judi Robertson MD Will see patient tomorrow morning for recheck-order CMP . Recent CBC 7/2018 within normal limits. if pain not controlled may consider narcotic temporarily. Referral to hematology.   if patient has any chest pain, shortness of breath or severe headach History of colonoscopy with polypectomy     DNR (do not resuscitate)     Moderate episode of recurrent major depressive disorder (HCC)     Nasal dermoid cyst     Nonallergic rhinitis     Lymphocytopenia     Diarrhea      Imaging & Referrals:  ORTHOPEDIC -

## 2018-08-22 NOTE — TELEPHONE ENCOUNTER
Spoke with pt. Aware DVT left calf-Rx Xarelto sent to pharmacy. Will follow up in a.m. for recheck and blood work.

## 2018-08-22 NOTE — PATIENT INSTRUCTIONS
Knee Pain  Knee pain is very common. It’s especially common in active people who put a lot of pressure on their knees, like runners. It affects women more often than men. Your kneecap (patella) is a thick, round bone.  It covers and protects the front po This type of knee pain is a dull, aching pain in the front of the knee in the area under and around the kneecap. This pain may start quickly or slowly. Your pain might be worse when you squat, run, or sit for a long time.  You might also sometimes feel like · Support your knee as advised by your doctor or physical therapist  · Increase training gradually, and ease up on training when needed  · Have an expert check your gait for running or other sporting activities  · Stretch properly before and after exercise · Apply an ice pack over the injured area for 15 to 20 minutes every 3 to 6 hours. You should do this for the first 24 to 48 hours.  You can make an ice pack by filling a plastic bag that seals at the top with ice cubes and then wrapping it with a thin towe Date Last Reviewed: 11/23/2015  © 9272-0013 [employer]. [address] [facility] [place], [address]. All rights reserved. This information is not intended as a substitute for professional medical care.  Always follow your healthcare Nitesh Mclain

## 2018-08-22 NOTE — TELEPHONE ENCOUNTER
THE [facility] OF [facility] Radiology calling with stat results positive for DVT posterior tibial. Pt is waiting for results and recommendations from MD. Chaney taking call to speak to pt regarding further care.

## 2018-08-23 ENCOUNTER — OFFICE VISIT (OUTPATIENT)
Dept: FAMILY MEDICINE CLINIC | Facility: CLINIC | Age: 68
End: 2018-08-23
Payer: MEDICARE

## 2018-08-23 VITALS
RESPIRATION RATE: 18 BRPM | SYSTOLIC BLOOD PRESSURE: 122 MMHG | DIASTOLIC BLOOD PRESSURE: 76 MMHG | WEIGHT: 244 LBS | BODY MASS INDEX: 41 KG/M2 | TEMPERATURE: 98 F | HEART RATE: 78 BPM | OXYGEN SATURATION: 98 %

## 2018-08-23 DIAGNOSIS — I82.442 ACUTE DEEP VEIN THROMBOSIS (DVT) OF TIBIAL VEIN OF LEFT LOWER EXTREMITY (HCC): ICD-10-CM

## 2018-08-23 DIAGNOSIS — M17.12 PRIMARY OSTEOARTHRITIS OF ONE KNEE, LEFT: Primary | ICD-10-CM

## 2018-08-23 LAB
ALBUMIN SERPL-MCNC: 3.6 G/DL (ref 3.5–4.8)
ALBUMIN/GLOB SERPL: 1.2 {RATIO} (ref 1–2)
ALP LIVER SERPL-CCNC: 74 U/L (ref 55–142)
ALT SERPL-CCNC: 33 U/L (ref 14–54)
ANION GAP SERPL CALC-SCNC: 10 MMOL/L (ref 0–18)
AST SERPL-CCNC: 23 U/L (ref 15–41)
BILIRUB SERPL-MCNC: 0.4 MG/DL (ref 0.1–2)
BUN BLD-MCNC: 12 MG/DL (ref 8–20)
BUN/CREAT SERPL: 15 (ref 10–20)
CALCIUM BLD-MCNC: 9.2 MG/DL (ref 8.3–10.3)
CHLORIDE SERPL-SCNC: 101 MMOL/L (ref 101–111)
CO2 SERPL-SCNC: 22 MMOL/L (ref 22–32)
CREAT BLD-MCNC: 0.8 MG/DL (ref 0.55–1.02)
GLOBULIN PLAS-MCNC: 3.1 G/DL (ref 2.5–4)
GLUCOSE BLD-MCNC: 111 MG/DL (ref 70–99)
M PROTEIN MFR SERPL ELPH: 6.7 G/DL (ref 6.1–8.3)
OSMOLALITY SERPL CALC.SUM OF ELEC: 276 MOSM/KG (ref 275–295)
POTASSIUM SERPL-SCNC: 4.5 MMOL/L (ref 3.6–5.1)
SODIUM SERPL-SCNC: 133 MMOL/L (ref 136–144)

## 2018-08-23 PROCEDURE — 99213 OFFICE O/P EST LOW 20 MIN: CPT | Performed by: FAMILY MEDICINE

## 2018-08-23 PROCEDURE — 80053 COMPREHEN METABOLIC PANEL: CPT | Performed by: FAMILY MEDICINE

## 2018-08-23 PROCEDURE — 36415 COLL VENOUS BLD VENIPUNCTURE: CPT | Performed by: FAMILY MEDICINE

## 2018-08-23 RX ORDER — DOCUSATE SODIUM 100 MG/1
100 CAPSULE, LIQUID FILLED ORAL 2 TIMES DAILY PRN
Qty: 60 CAPSULE | Refills: 0 | Status: SHIPPED | OUTPATIENT
Start: 2018-08-23 | End: 2019-01-11 | Stop reason: ALTCHOICE

## 2018-08-23 RX ORDER — ACETAMINOPHEN AND CODEINE PHOSPHATE 300; 30 MG/1; MG/1
1 TABLET ORAL EVERY 6 HOURS PRN
Qty: 16 TABLET | Refills: 0 | Status: SHIPPED | OUTPATIENT
Start: 2018-08-23 | End: 2018-08-30

## 2018-08-23 NOTE — PROGRESS NOTES
CHIEF COMPLAINT: Patient presents with: Follow - Up: left leg dvt    HPI:     Ralph Morfin is a 79year old female presents for follow-up of blood clots/DVT left leg on Xarelto had first dose yesterday. Diagnosed by ultrasound.   Denies side effects • History of colonoscopy with polypectomy 2012    hemorrhoids- repeat colonscopy 4/7/2017 negative- repeat in 5 years. • Hypothyroidism 2011    acquired.  no cancer or nodules   • Migraines    • Normal vaginal delivery     [id number]   • Other and unspec NOSTRIL EVERY DAY Disp: 3 Bottle Rfl: 0   Levothyroxine Sodium 50 MCG Oral Tab Take 1 tablet (50 mcg total) by mouth once daily. Disp: 90 tablet Rfl: 3   atorvastatin 40 MG Oral Tab TAKE 1 TABLET BY MOUTH EVERY NIGHT.  AVOID GRAPEFRUIT JUICE Disp: 90 tablet Units by mouth daily. Disp:  Rfl:    [START ON 9/12/2018] Rivaroxaban (XARELTO) 20 MG Oral Tab Take 1 tablet (20 mg total) by mouth daily with food.  Disp: 30 tablet Rfl: 2       Allergies:    Sulfa Antibiotics       UNKNOWN    Comment:Carried over from ree bilateral and equal and symmetric.gait normal  VASCULAR: Pedal pulse +2 bilateral  SKIN: warm and dry. No rashes.    LABS      Xr Knee (1 Or 2 Views), Left (cpt=73560)    Result Date: 8/11/2018  PROCEDURE:  XR KNEE (1 OR 2 VIEWS), LEFT (CPT=73560)  COMPARIS by: Anais Fraser MD            Us Venous Doppler Leg Left - Diag Img (cpt=93971)    Result Date: 8/22/2018  PROCEDURE:  US VENOUS DOPPLER LEG LEFT - DIAG IMG (CPT=93971)  COMPARISON:  None.   INDICATIONS:  Z78.9 Other specified health status M79.662 P bakers cyst  FU in 2-3 weeks with orthopedist-Dr. Bayron oRdriguez- knee injections? No nsaids due to xarelto  - docusate sodium 100 MG Oral Cap; Take 1 capsule (100 mg total) by mouth 2 (two) times daily as needed for constipation (due to T#3).   Dispense: 60 capsule; syndrome, shoulder, left     Bursitis, shoulder, left     History of colonoscopy with polypectomy     DNR (do not resuscitate)     Moderate episode of recurrent major depressive disorder ([facility] [address]     Nasal dermoid cyst     Nonallergic rhinitis     Lymphocytop

## 2018-08-23 NOTE — PROGRESS NOTES
Patient came in for non fasting labs. Patient drawn out of right hand x 1 attempt. Green tube drawn.

## 2018-08-23 NOTE — PATIENT INSTRUCTIONS
Understanding Deep Vein Thrombosis    Deep vein thrombosis (DVT) is a condition in which a blood clot or thrombus forms in a deep vein.  A blood clot is most common in the leg, but can develop in a large vein deep inside the leg, arm, or other part of the · Being overweight  Common symptoms  A blood clot does not always cause obvious symptoms. If you do have symptoms, they usually happen suddenly.  They may include:  · Pain, especially deep in the muscle  · Swelling  · Aching or tenderness  · Red or warm ski Many people who are in the hospital are at an increased risk of developing blood clots. So, preventing blood clots is an important part of in-hospital care.  The care may include getting out of bed regularly, taking medicine, or using special therapies or d · Cancer and certain cancer treatments  · Smoking  Other things can also put you at higher risk for a blood clot.  They include:  · Age over 61 years  · Pregnancy  · Taking birth control pills or hormone replacement  · Having other vein problems, such as va Call 911  If you have symptoms of a blood clot in your lungs, call 911.  The symptoms are:  · Chest pain  · Trouble breathing  · Fast heartbeat  · Coughing (may cough up blood)  · Sweating  · Fainting  When to call your healthcare provider  If you have symp Your healthcare provider will usually prescribe an anticoagulant medicine. This medicine is a blood thinner that helps to prevent new blood clots. Anticoagulants can be given by mouth (oral), by injection, or into your vein (intravenous or IV).  Commonly us Vitamin K can interact with warfarin and reduce its ability to thin your blood. Vitamin K helps your blood clot. So sudden changes in vitamin K intake can affect the way warfarin works. You don’t need to avoid foods with vitamin K.  Instead, keep the amount · Wiggle your toes and move your ankles while sitting or lying down. · When traveling by car, make frequent stops to get up and move around.   · On long airplane rides, get up and move around when possible. If you can’t get up, wiggle your toes, move your © 3369-1470 [employer] [address]. [address] [facility], [facility] [place]. All rights reserved. This information is not intended as a substitute for professional medical care. Always follow your healthcare professional's instructions.

## 2018-08-24 ENCOUNTER — TELEPHONE (OUTPATIENT)
Dept: FAMILY MEDICINE CLINIC | Facility: CLINIC | Age: 68
End: 2018-08-24

## 2018-08-24 NOTE — TELEPHONE ENCOUNTER
Spoke with pt, reviewed lab results, pt verbalized understanding    Notes recorded by Viv Dudley DO on 8/24/2018 at 3:25 PM CDT  Results reviewed. Released to [address] Will discuss with patient at next visit.

## 2018-08-27 ENCOUNTER — TELEPHONE (OUTPATIENT)
Dept: FAMILY MEDICINE CLINIC | Facility: CLINIC | Age: 68
End: 2018-08-27

## 2018-08-27 NOTE — TELEPHONE ENCOUNTER
Pt called stating she is nor feeling well and wants to speak to the nurse in regards to her symptoms. Pt did not clarified what the symptoms were, she just stated she wanted to speak to a nurse.

## 2018-08-27 NOTE — TELEPHONE ENCOUNTER
Pt called stating she was having left leg pain behind knee. Pain resolved 2 hours after taking tylenol arthritis medicine. PT denies any leg swelling redness. Pt denies SOB, difficulty breathing, or dizziness. Pt currently has no leg pain, resolved.  PT inst

## 2018-09-05 ENCOUNTER — OFFICE VISIT (OUTPATIENT)
Dept: HEMATOLOGY/ONCOLOGY | Age: 68
End: 2018-09-05
Attending: INTERNAL MEDICINE
Payer: MEDICARE

## 2018-09-05 VITALS
DIASTOLIC BLOOD PRESSURE: 77 MMHG | TEMPERATURE: 98 F | RESPIRATION RATE: 20 BRPM | OXYGEN SATURATION: 94 % | SYSTOLIC BLOOD PRESSURE: 146 MMHG | WEIGHT: 240.63 LBS | BODY MASS INDEX: 40 KG/M2 | HEART RATE: 105 BPM

## 2018-09-05 DIAGNOSIS — I82.4Z2 ACUTE DEEP VEIN THROMBOSIS (DVT) OF DISTAL END OF LEFT LOWER EXTREMITY (HCC): Primary | ICD-10-CM

## 2018-09-05 PROCEDURE — 99204 OFFICE O/P NEW MOD 45 MIN: CPT | Performed by: INTERNAL MEDICINE

## 2018-09-05 NOTE — CONSULTS
Cancer Center Report of Consultation    Patient Name: Joseph Sykes   YOB: 1950   Medical Record Number: [id number]   CSN: [id number]   Consulting Physician: Miguelangel Angel M.D. Referring Physician: No ref.  provider found    Diego Comment: to the right knee only  3/8/2015 : KNEE SURGERY  9/8/2015 : KNEE SURGERY      Comment: 3 knee surgeries, replacement and arthoscopy   12/2012: OTHER SURGICAL HISTORY      Comment: right arm carpal tunnel    Gynecologic History:  Obstetric Histor tablet, Rfl: 3  •  atorvastatin 40 MG Oral Tab, TAKE 1 TABLET BY MOUTH EVERY NIGHT.  AVOID GRAPEFRUIT JUICE, Disp: 90 tablet, Rfl: 1  •  MetFORMIN HCl 500 MG Oral Tab, TAKE 1 TABLET(500 MG) BY MOUTH TWICE DAILY, Disp: 180 tablet, Rfl: 1  •  lisinopril 10 MG with food. , Disp: 30 tablet, Rfl: 2  •  Econazole Nitrate 1 % External Cream, Apply 1 g topically 2 (two) times daily. , Disp: 85 g, Rfl: 1    Allergies:    Sulfa Antibiotics       UNKNOWN    Comment:Carried over from childhood     Review of Systems:    Con Normal - No petechiae or purpura. No tender or palpable lymph nodes in the cervical, supraclavicular, axillary or inguinal area. Respiratory Normal - Lungs are clear to auscultation without rhonchi or wheezing.    Cardiovascular Normal - Regular rate and 4.0 g/dL 3.1   A/G RATIO Latest Ref Range: 1.0 - 2.0  1.2   TOTAL PROTEIN Latest Ref Range: 6.1 - 8.3 g/dL 6.7   Albumin Latest Ref Range: 3.5 - 4.8 g/dL 3.6       Radiology:  LLE Doppler: CONCLUSION:  DVT in 1 of the left posterior tibial veins is noted.

## 2018-09-08 DIAGNOSIS — Z87.19 HISTORY OF GASTRITIS: ICD-10-CM

## 2018-09-10 RX ORDER — OMEPRAZOLE 40 MG/1
CAPSULE, DELAYED RELEASE ORAL
Qty: 90 CAPSULE | Refills: 0 | Status: SHIPPED | OUTPATIENT
Start: 2018-09-10 | End: 2018-12-14

## 2018-09-10 NOTE — TELEPHONE ENCOUNTER
Pt requesting refill of Omeprazole, no protocol, unable to approve:     Last Time Medication was Filled:  6/12/18    Last Office Visit with PCP: 8/23/18    Future Appointments   Date Time Provider Seb Montgomery   10/10/2018  9:30 AM James Hayden MD OB

## 2018-09-14 ENCOUNTER — TELEPHONE (OUTPATIENT)
Dept: FAMILY MEDICINE CLINIC | Facility: CLINIC | Age: 68
End: 2018-09-14

## 2018-09-14 NOTE — TELEPHONE ENCOUNTER
Patient is schedule for her flu shot on 9/21 and is asking if she can get the shot if she is on Xarelto. Also she needs to renew her handicap sticker and said that doctor gives her the paperwork.

## 2018-09-18 NOTE — TELEPHONE ENCOUNTER
Left message for pt informing her of Handicap form and pt is able to have flu shot, no contraindication    Provided office hours and phone # for questions

## 2018-09-21 ENCOUNTER — IMMUNIZATION (OUTPATIENT)
Dept: FAMILY MEDICINE CLINIC | Facility: CLINIC | Age: 68
End: 2018-09-21
Payer: MEDICARE

## 2018-09-21 PROCEDURE — G0008 ADMIN INFLUENZA VIRUS VAC: HCPCS | Performed by: FAMILY MEDICINE

## 2018-09-21 PROCEDURE — 90653 IIV ADJUVANT VACCINE IM: CPT | Performed by: FAMILY MEDICINE

## 2018-10-10 PROBLEM — E66.01 OBESITY, MORBID, BMI 40.0-49.9 (HCC): Status: ACTIVE | Noted: 2018-10-10

## 2018-10-10 PROBLEM — I82.442 ACUTE DEEP VEIN THROMBOSIS (DVT) OF TIBIAL VEIN OF LEFT LOWER EXTREMITY (HCC): Status: ACTIVE | Noted: 2018-10-10

## 2018-10-26 ENCOUNTER — TELEPHONE (OUTPATIENT)
Dept: HEMATOLOGY/ONCOLOGY | Facility: HOSPITAL | Age: 68
End: 2018-10-26

## 2018-10-26 NOTE — TELEPHONE ENCOUNTER
Pt states she is having intermediate pain in the back of her calf. She describes it as annoying. She is on xarelto. Dr. Doroteo Cordoba informed. Pt should get repeat ultrasound now. Pt informed and verbalized understanding.

## 2018-10-27 ENCOUNTER — HOSPITAL ENCOUNTER (OUTPATIENT)
Dept: ULTRASOUND IMAGING | Age: 68
Discharge: HOME | End: 2018-10-27
Attending: INTERNAL MEDICINE
Payer: MEDICARE

## 2018-10-27 DIAGNOSIS — I82.4Z2 ACUTE DEEP VEIN THROMBOSIS (DVT) OF DISTAL END OF LEFT LOWER EXTREMITY (HCC): ICD-10-CM

## 2018-10-27 PROCEDURE — 93971 EXTREMITY STUDY: CPT | Performed by: INTERNAL MEDICINE

## 2018-10-29 RX ORDER — FLUTICASONE PROPIONATE 50 MCG
SPRAY, SUSPENSION (ML) NASAL
Qty: 3 BOTTLE | Refills: 0 | Status: SHIPPED | OUTPATIENT
Start: 2018-10-29 | End: 2019-01-28

## 2018-10-29 NOTE — TELEPHONE ENCOUNTER
Pt requesting refill on Fluticasone, protocol passed, refill approved    LOV 8/22/18    LF 8/2/18    Future Appointments   Date Time Provider Seb Montgomery   11/9/2018 10:00 AM William Samayoa MD [facility] [place]   12/5/2018  9:30 AM Cj Foster

## 2018-10-30 ENCOUNTER — TELEPHONE (OUTPATIENT)
Dept: HEMATOLOGY/ONCOLOGY | Facility: HOSPITAL | Age: 68
End: 2018-10-30

## 2018-10-31 NOTE — TELEPHONE ENCOUNTER
MD Sheela Oakley RN   Caller: Unspecified Shanti Noriega,  4:23 PM)             Ultrasound looks great, but she still has to come in for a D-Dimer and a visit before stopping anticoagulation.       Pt informed and verbalized underst

## 2018-11-06 ENCOUNTER — TELEPHONE (OUTPATIENT)
Dept: HEMATOLOGY/ONCOLOGY | Facility: HOSPITAL | Age: 68
End: 2018-11-06

## 2018-11-06 NOTE — TELEPHONE ENCOUNTER
MD Radha Guzman, RN   Caller: Unspecified (Today,  2:32 PM)             YesNancy Carpenter should be able to fly for maty. Spoke to pt, verbalized understanding.

## 2018-11-06 NOTE — TELEPHONE ENCOUNTER
Pt called and wants to fly to see her daughter during [place]. Fares are low right now and needs to know today if she can book the flight or not. Pt currently still on her Xarelto.

## 2018-11-09 ENCOUNTER — TELEPHONE (OUTPATIENT)
Dept: FAMILY MEDICINE CLINIC | Facility: CLINIC | Age: 68
End: 2018-11-09

## 2018-11-09 NOTE — TELEPHONE ENCOUNTER
Spoke to pt informed her that CDC recommends receiving the new Shingrix vaccine suellen if already received Zostavax. Pt verbalized understanding.  Informed her that she can receive at retail pharmacies such as [facility] or [facility].

## 2018-11-09 NOTE — TELEPHONE ENCOUNTER
Patient called wondering if she needs to get another shingle vaccine. She heard that there was a new vaccine and her mother has gotten the shingles.   Please call her at [phone]

## 2018-12-05 ENCOUNTER — OFFICE VISIT (OUTPATIENT)
Dept: HEMATOLOGY/ONCOLOGY | Age: 68
End: 2018-12-05
Attending: INTERNAL MEDICINE
Payer: MEDICARE

## 2018-12-05 VITALS
RESPIRATION RATE: 20 BRPM | SYSTOLIC BLOOD PRESSURE: 130 MMHG | BODY MASS INDEX: 41 KG/M2 | HEART RATE: 86 BPM | OXYGEN SATURATION: 96 % | TEMPERATURE: 97 F | DIASTOLIC BLOOD PRESSURE: 80 MMHG | WEIGHT: 234 LBS

## 2018-12-05 DIAGNOSIS — I82.442 ACUTE DEEP VEIN THROMBOSIS (DVT) OF TIBIAL VEIN OF LEFT LOWER EXTREMITY (HCC): ICD-10-CM

## 2018-12-05 DIAGNOSIS — I82.4Z2 ACUTE DEEP VEIN THROMBOSIS (DVT) OF DISTAL END OF LEFT LOWER EXTREMITY (HCC): Primary | ICD-10-CM

## 2018-12-05 PROCEDURE — 99213 OFFICE O/P EST LOW 20 MIN: CPT | Performed by: INTERNAL MEDICINE

## 2018-12-05 NOTE — PROGRESS NOTES
Cancer Center Progress Note  Patient Name: Joseph Sykes   YOB: 1950   Medical Record Number: [id number]   CSN: [id number]   Attending Physician: Miguelangel Angel M.D.        Date of Visit: 12/5/2018     Chief Complaint:  Patient pre COLONOSCOPY N/A 4/7/2017    Performed by Debbie Hale MD at [address]      to the right knee only   • KNEE SURGERY  3/8/2015    • KNEE SURGERY  9/8/2015     3 knee surgeries, replacement and arthoscopy    • OTHER SURGICAL Rivaroxaban (XARELTO) 20 MG Oral Tab, Take 1 tablet (20 mg total) by mouth daily with food. , Disp: 30 tablet, Rfl: 2  •  Phendimetrazine Tartrate 35 MG Oral Tab, Take 1 tablet (35 mg total) by mouth daily. , Disp: 30 tablet, Rfl: 0  •  Levothyroxine Sodium half tablet , Disp: , Rfl:   •  BuPROPion HCl ER, XL, 300 MG Oral Tablet 24 Hr, TK ONE T PO QAM, Disp: , Rfl: 2  •  Calcium Carbonate-Vitamin D (OS-SHELBY 500 + D OR), Take 1 tablet by mouth 2 (two) times daily. , Disp: , Rfl:   •  Econazole Nitrate 1 % Extern nourished. Well developed. Eyes Normal - Conjunctivae and sclerae are clear and without icterus. Pupils are reactive and equal.   Hematologic/Lymphatic Normal - No petechiae or purpura.   No tender or palpable lymph nodes in the cervical, supraclavicular, [facility]

## 2018-12-14 DIAGNOSIS — Z87.19 HISTORY OF GASTRITIS: ICD-10-CM

## 2018-12-17 RX ORDER — OMEPRAZOLE 40 MG/1
CAPSULE, DELAYED RELEASE ORAL
Qty: 90 CAPSULE | Refills: 0 | Status: SHIPPED | OUTPATIENT
Start: 2018-12-17 | End: 2019-03-19

## 2018-12-17 NOTE — TELEPHONE ENCOUNTER
Pt requesting refill of Omeprazole, no protocol, unable to approve:     Last Time Medication was Filled:  9/10/18    Last Office Visit with PCP: 8/23/18    Future Appointments   Date Time Provider Seb Montogmery   12/19/2018 10:45 AM Jasmin Wade MD OB

## 2018-12-26 DIAGNOSIS — Z87.19 HISTORY OF GASTRITIS: ICD-10-CM

## 2018-12-26 RX ORDER — OMEPRAZOLE 40 MG/1
CAPSULE, DELAYED RELEASE ORAL
Qty: 90 CAPSULE | Refills: 0 | Status: CANCELLED | OUTPATIENT
Start: 2018-12-26

## 2018-12-26 RX ORDER — MONTELUKAST SODIUM 10 MG/1
10 TABLET ORAL NIGHTLY
Qty: 90 TABLET | Refills: 0 | Status: CANCELLED | OUTPATIENT
Start: 2018-12-26

## 2018-12-26 NOTE — TELEPHONE ENCOUNTER
Spoke with pt, informed her montelukast refill request sent in today from pulmonary md and omeprazole was sent to different pharmacy [facility] 12/17/18.  PT states she already picked up omeprazole rx and does not need refill for montelukast since Dr. Arely Watts

## 2019-01-11 ENCOUNTER — OFFICE VISIT (OUTPATIENT)
Dept: FAMILY MEDICINE CLINIC | Facility: CLINIC | Age: 69
End: 2019-01-11
Payer: MEDICARE

## 2019-01-11 VITALS
HEART RATE: 83 BPM | HEIGHT: 63.5 IN | TEMPERATURE: 98 F | WEIGHT: 232.38 LBS | SYSTOLIC BLOOD PRESSURE: 112 MMHG | DIASTOLIC BLOOD PRESSURE: 68 MMHG | BODY MASS INDEX: 40.67 KG/M2 | OXYGEN SATURATION: 96 % | RESPIRATION RATE: 18 BRPM

## 2019-01-11 DIAGNOSIS — Z11.59 NEED FOR HEPATITIS C SCREENING TEST: ICD-10-CM

## 2019-01-11 DIAGNOSIS — I82.442 ACUTE DEEP VEIN THROMBOSIS (DVT) OF TIBIAL VEIN OF LEFT LOWER EXTREMITY (HCC): ICD-10-CM

## 2019-01-11 DIAGNOSIS — F33.41 RECURRENT MAJOR DEPRESSIVE DISORDER, IN PARTIAL REMISSION (HCC): ICD-10-CM

## 2019-01-11 DIAGNOSIS — E78.2 MIXED DYSLIPIDEMIA: ICD-10-CM

## 2019-01-11 DIAGNOSIS — I10 BENIGN ESSENTIAL HTN: ICD-10-CM

## 2019-01-11 DIAGNOSIS — Z00.00 ENCOUNTER FOR ANNUAL HEALTH EXAMINATION: Primary | ICD-10-CM

## 2019-01-11 DIAGNOSIS — Z12.39 SCREENING FOR BREAST CANCER: ICD-10-CM

## 2019-01-11 DIAGNOSIS — R73.03 PREDIABETES: ICD-10-CM

## 2019-01-11 DIAGNOSIS — Z66 DNR (DO NOT RESUSCITATE): ICD-10-CM

## 2019-01-11 DIAGNOSIS — Z78.0 ASYMPTOMATIC POSTMENOPAUSAL STATUS (AGE-RELATED) (NATURAL): ICD-10-CM

## 2019-01-11 DIAGNOSIS — E03.9 ACQUIRED HYPOTHYROIDISM: ICD-10-CM

## 2019-01-11 PROBLEM — R19.7 DIARRHEA: Status: RESOLVED | Noted: 2018-07-18 | Resolved: 2019-01-11

## 2019-01-11 PROCEDURE — 99214 OFFICE O/P EST MOD 30 MIN: CPT | Performed by: FAMILY MEDICINE

## 2019-01-11 PROCEDURE — G0439 PPPS, SUBSEQ VISIT: HCPCS | Performed by: FAMILY MEDICINE

## 2019-01-11 RX ORDER — LISINOPRIL 10 MG/1
TABLET ORAL
Qty: 90 TABLET | Refills: 1 | Status: SHIPPED | OUTPATIENT
Start: 2019-01-11 | End: 2019-08-21

## 2019-01-11 RX ORDER — ATORVASTATIN CALCIUM 40 MG/1
TABLET, FILM COATED ORAL
Qty: 90 TABLET | Refills: 1 | Status: SHIPPED | OUTPATIENT
Start: 2019-01-11 | End: 2019-08-21

## 2019-01-11 NOTE — PATIENT INSTRUCTIONS
BRING IN POWER OF  TO ADD TO YOUR MEDICAL CHART    Perform labs fasting 8 hours with water or black coffee or or black tea diet  soda only prior to exam- NEXT WEEK      Paty Parsons's SCREENING SCHEDULE   Tests on this list are recommended by aneurysm screening (once between ages 73-68) IPPE only No results found for this or any previous visit.  Limited to patients who meet one of the following criteria:   • Men who are 73-68 years old and have smoked more than 100 cigarettes in their lifetime and as needed after 75 Mammogram due on 01/17/2019 Please get this Mammogram regularly   Immunizations      Influenza  Covered Annually Orders placed or performed in visit on 12/20/16   • FLU VACC PRSV FREE INC ANTIG    Please get every year    Pneumococca American)  [email or web address] org  This link also has information from the [address] [place] [address] regarding Advance Directives.

## 2019-01-11 NOTE — PROGRESS NOTES
HPI:   Gricel Vivas is a 76year old female who presents for a Medicare Subsequent Annual Wellness visit (Pt already had Initial Annual Wellness). Left lower extremity DVT on Xarelto for 90 days.   Patient will have further testing if indicated w palpitations, increased BM's or wgt loss. Very fatigued and mood down, denies wgt gain. Has impaired fasting glucose. Checks blood sugars bid and fasting 110, post prandial 110-140. On metformin. Denies polyuria or polydipsia or increase hunger.  Overdue walking?: Yes - OA knees left bone on bone, right TKR-moderate loss of function.  Denies recent falls - uses walker and cane            Depression Screening (PHQ-2/PHQ-9): Over the LAST 2 WEEKS   Little interest or pleasure in doing things (over the last tw remission (HCC)     Chronic migraine without aura without status migrainosus, not intractable     Painful total knee replacement (HCC)     Biceps tendonitis     Impingement syndrome, shoulder, left     Bursitis, shoulder, left     History of colonoscopy wi 1 tablet (75 mcg total) by mouth once daily. FLUTICASONE PROPIONATE 50 MCG/ACT Nasal Suspension SHAKE LIQUID AND USE 2 SPRAYS IN EACH NOSTRIL EVERY DAY   Acetaminophen (TYLENOL ARTHRITIS PAIN OR) Take by mouth.    atorvastatin 40 MG Oral Tab TAKE 1 TABLET cholecystectomy (1993); other surgical history (12/2012); knee surgery (3/8/2015 ); knee surgery (9/8/2015 ); colonoscopy (2012); knee replacement surgery; and COLONOSCOPY (N/A, 4/7/2017).     Her family history includes Cancer in her paternal grandmother; Both Eyes Chart Acuity: 20/50   Able To Tolerate Visual Acuity: Yes      General Appearance:  Alert, cooperative, no distress, appears stated age   Head:  Normocephalic, without obvious abnormality, atraumatic   Eyes:  PERRL, conjunctiva/corneas clear, EOM 12/20/2016   • HIGH DOSE FLU 65 YRS AND OLDER PRSV FREE SINGLE D (15253) FLU CLINIC 12/20/2016   • Influenza 09/21/2015   • Pneumococcal (Prevnar 13) 05/20/2016   • Pneumovax 23 03/12/2015, 12/20/2016   • TDAP 11/09/2016   • Varicella Deferred (Had Chicken not resuscitate)   Wants to continue DNR status.  Agrees to bring POA    Screen for Hepatitis C  -hep C antibody ordered    Acute DVT of tibial vein of left lower extremity   -continue Xarelto   Continue FU with Dr Kate Moya    Other orders    Diet assessme visit. No flowsheet data found. Fecal Occult Blood Annually No results found for: FOB No flowsheet data found. Glaucoma Screening      Ophthalmology Visit Annually: Diabetics, FHx Glaucoma, AA>50, > 65 No flowsheet data found.     Bone Densit Lab or Procedure      Annual Monitoring of Persistent     Medications (ACE/ARB, digoxin diuretics, anticonvulsants.)    Potassium  Annually Potassium (mmol/L)   Date Value   08/23/2018 4.5   05/24/2006 4.4    No flowsheet data found.     Creatinine  Annuall

## 2019-01-18 ENCOUNTER — TELEPHONE (OUTPATIENT)
Dept: FAMILY MEDICINE CLINIC | Facility: CLINIC | Age: 69
End: 2019-01-18

## 2019-01-18 NOTE — TELEPHONE ENCOUNTER
Patient signed HIPAA medical records authorization form for the Facility identified below to disclose patient health information to Dimas Vee / Provider Name: [address] Address: [address] [place] [address].  [place], [place]

## 2019-01-23 ENCOUNTER — NURSE ONLY (OUTPATIENT)
Dept: FAMILY MEDICINE CLINIC | Facility: CLINIC | Age: 69
End: 2019-01-23
Payer: MEDICARE

## 2019-01-23 DIAGNOSIS — E78.2 MIXED DYSLIPIDEMIA: ICD-10-CM

## 2019-01-23 DIAGNOSIS — R73.03 PREDIABETES: ICD-10-CM

## 2019-01-23 DIAGNOSIS — Z11.59 NEED FOR HEPATITIS C SCREENING TEST: ICD-10-CM

## 2019-01-23 DIAGNOSIS — E03.9 ACQUIRED HYPOTHYROIDISM: ICD-10-CM

## 2019-01-23 DIAGNOSIS — I10 BENIGN ESSENTIAL HTN: ICD-10-CM

## 2019-01-23 DIAGNOSIS — R19.7 DIARRHEA, UNSPECIFIED TYPE: ICD-10-CM

## 2019-01-23 LAB
ALBUMIN SERPL-MCNC: 3.7 G/DL (ref 3.1–4.5)
ALBUMIN/GLOB SERPL: 1.3 {RATIO} (ref 1–2)
ALP LIVER SERPL-CCNC: 66 U/L (ref 55–142)
ALT SERPL-CCNC: 19 U/L (ref 14–54)
ANION GAP SERPL CALC-SCNC: 7 MMOL/L (ref 0–18)
AST SERPL-CCNC: 15 U/L (ref 15–41)
BILIRUB SERPL-MCNC: 0.4 MG/DL (ref 0.1–2)
BUN BLD-MCNC: 10 MG/DL (ref 8–20)
BUN/CREAT SERPL: 16.7 (ref 10–20)
CALCIUM BLD-MCNC: 8.5 MG/DL (ref 8.3–10.3)
CHLORIDE SERPL-SCNC: 95 MMOL/L (ref 101–111)
CHOLEST SMN-MCNC: 109 MG/DL (ref ?–200)
CO2 SERPL-SCNC: 27 MMOL/L (ref 22–32)
CREAT BLD-MCNC: 0.6 MG/DL (ref 0.55–1.02)
EST. AVERAGE GLUCOSE BLD GHB EST-MCNC: 128 MG/DL (ref 68–126)
GLOBULIN PLAS-MCNC: 2.9 G/DL (ref 2.8–4.4)
GLUCOSE BLD-MCNC: 82 MG/DL (ref 70–99)
HBA1C MFR BLD HPLC: 6.1 % (ref ?–5.7)
HCV AB SERPL QL IA: NONREACTIVE
HDLC SERPL-MCNC: 48 MG/DL (ref 40–59)
LDLC SERPL CALC-MCNC: 43 MG/DL (ref ?–100)
M PROTEIN MFR SERPL ELPH: 6.6 G/DL (ref 6.4–8.2)
NONHDLC SERPL-MCNC: 61 MG/DL (ref ?–130)
OSMOLALITY SERPL CALC.SUM OF ELEC: 266 MOSM/KG (ref 275–295)
POTASSIUM SERPL-SCNC: 4.2 MMOL/L (ref 3.6–5.1)
SODIUM SERPL-SCNC: 129 MMOL/L (ref 136–144)
T4 FREE SERPL-MCNC: 1.1 NG/DL (ref 0.9–1.8)
TRIGL SERPL-MCNC: 91 MG/DL (ref 30–149)
TSI SER-ACNC: 1.69 MIU/ML (ref 0.35–5.5)
VLDLC SERPL CALC-MCNC: 18 MG/DL (ref 0–30)

## 2019-01-23 PROCEDURE — 86803 HEPATITIS C AB TEST: CPT | Performed by: FAMILY MEDICINE

## 2019-01-23 PROCEDURE — 80061 LIPID PANEL: CPT | Performed by: FAMILY MEDICINE

## 2019-01-23 PROCEDURE — 36415 COLL VENOUS BLD VENIPUNCTURE: CPT | Performed by: FAMILY MEDICINE

## 2019-01-23 PROCEDURE — 84439 ASSAY OF FREE THYROXINE: CPT | Performed by: FAMILY MEDICINE

## 2019-01-23 PROCEDURE — 83036 HEMOGLOBIN GLYCOSYLATED A1C: CPT | Performed by: FAMILY MEDICINE

## 2019-01-23 PROCEDURE — 84443 ASSAY THYROID STIM HORMONE: CPT | Performed by: FAMILY MEDICINE

## 2019-01-23 PROCEDURE — 80053 COMPREHEN METABOLIC PANEL: CPT | Performed by: FAMILY MEDICINE

## 2019-01-23 NOTE — PROGRESS NOTES
Patient came in for fasting labs. Patient drawn out of left hand x 1 attempt. Gold, green, and lav tube drawn.

## 2019-01-24 ENCOUNTER — TELEPHONE (OUTPATIENT)
Dept: HEMATOLOGY/ONCOLOGY | Facility: HOSPITAL | Age: 69
End: 2019-01-24

## 2019-01-24 NOTE — TELEPHONE ENCOUNTER
Pt needs a letter saying she can get a dental cleaning while on xarelto. Luanne Troncoso 254 fax . She has an appointment on Monday.

## 2019-01-25 ENCOUNTER — SOCIAL WORK SERVICES (OUTPATIENT)
Dept: HEMATOLOGY/ONCOLOGY | Facility: HOSPITAL | Age: 69
End: 2019-01-25

## 2019-01-25 ENCOUNTER — MED REC SCAN ONLY (OUTPATIENT)
Dept: FAMILY MEDICINE CLINIC | Facility: CLINIC | Age: 69
End: 2019-01-25

## 2019-01-28 RX ORDER — FLUTICASONE PROPIONATE 50 MCG
SPRAY, SUSPENSION (ML) NASAL
Qty: 3 BOTTLE | Refills: 0 | Status: SHIPPED | OUTPATIENT
Start: 2019-01-28 | End: 2019-05-08

## 2019-01-28 NOTE — TELEPHONE ENCOUNTER
Pt requesting a refill of fluticasone, protocol passed. Refill approved.     LOV with PCP 1/11/19    LF 10/29/18 #3     Future Appointments   Date Time Provider Seb Montgomery   1/30/2019 10:20 AM ABRIL CROWLEY [facility]   1/30/2019 10:40 AM ABRIL NICOLAS

## 2019-02-05 ENCOUNTER — TELEPHONE (OUTPATIENT)
Dept: FAMILY MEDICINE CLINIC | Facility: CLINIC | Age: 69
End: 2019-02-05

## 2019-02-05 NOTE — TELEPHONE ENCOUNTER
----- Message from Ekaterina Fung DO sent at 2/4/2019 11:49 PM CST -----  Patient with worsening  hyponatremia. Will order a urine sodium and referral for Dr. Devin Polk to reevaluate. Not on diuretic. Labs normal except prediabetes-A1c is increased to 6. 1.

## 2019-02-18 ENCOUNTER — APPOINTMENT (OUTPATIENT)
Dept: LAB | Age: 69
End: 2019-02-18
Attending: FAMILY MEDICINE
Payer: MEDICARE

## 2019-02-18 ENCOUNTER — HOSPITAL ENCOUNTER (OUTPATIENT)
Dept: BONE DENSITY | Age: 69
Discharge: HOME | End: 2019-02-18
Attending: FAMILY MEDICINE
Payer: MEDICARE

## 2019-02-18 ENCOUNTER — HOSPITAL ENCOUNTER (OUTPATIENT)
Dept: MAMMOGRAPHY | Age: 69
Discharge: HOME | End: 2019-02-18
Attending: FAMILY MEDICINE
Payer: MEDICARE

## 2019-02-18 DIAGNOSIS — E87.1 HYPONATREMIA: ICD-10-CM

## 2019-02-18 DIAGNOSIS — Z78.0 ASYMPTOMATIC POSTMENOPAUSAL STATUS (AGE-RELATED) (NATURAL): ICD-10-CM

## 2019-02-18 DIAGNOSIS — Z12.39 SCREENING FOR BREAST CANCER: ICD-10-CM

## 2019-02-18 LAB
OSMOLALITY UR: 206 MOSM/KG (ref 300–1300)
SODIUM SERPL-SCNC: 52 MMOL/L

## 2019-02-18 PROCEDURE — 77080 DXA BONE DENSITY AXIAL: CPT | Performed by: FAMILY MEDICINE

## 2019-02-18 PROCEDURE — 83935 ASSAY OF URINE OSMOLALITY: CPT

## 2019-02-18 PROCEDURE — 77063 BREAST TOMOSYNTHESIS BI: CPT | Performed by: FAMILY MEDICINE

## 2019-02-18 PROCEDURE — 77067 SCR MAMMO BI INCL CAD: CPT | Performed by: FAMILY MEDICINE

## 2019-02-18 PROCEDURE — 84300 ASSAY OF URINE SODIUM: CPT

## 2019-02-28 ENCOUNTER — TELEPHONE (OUTPATIENT)
Dept: FAMILY MEDICINE CLINIC | Facility: CLINIC | Age: 69
End: 2019-02-28

## 2019-02-28 DIAGNOSIS — G43.709 CHRONIC MIGRAINE WITHOUT AURA WITHOUT STATUS MIGRAINOSUS, NOT INTRACTABLE: ICD-10-CM

## 2019-02-28 RX ORDER — DIVALPROEX SODIUM 500 MG/1
500 TABLET, EXTENDED RELEASE ORAL 2 TIMES DAILY
Qty: 60 TABLET | Refills: 0 | Status: SHIPPED | OUTPATIENT
Start: 2019-02-28 | End: 2019-03-28

## 2019-02-28 NOTE — TELEPHONE ENCOUNTER
Informed pt of results and recommendations. Pt verbalized understanding.      Future Appointments   Date Time Provider Seb Montgomery   3/1/2019 10:00 AM Tameka Rios MD [facility] [place]   3/4/2019  9:20 AM DO AIMEE Quarles [facility]

## 2019-02-28 NOTE — TELEPHONE ENCOUNTER
Medication: DIVALPROEX SODIUM  MG TABLET    Date of last refill: 03/27/18 (#60/11)  [place]    Last office visit: 03/05/18  Due back to clinic per last office note:  Around 03/05/19  Date next office visit sche

## 2019-02-28 NOTE — TELEPHONE ENCOUNTER
----- Message from Rani Shankar DO sent at 2/20/2019  1:34 PM CST -----  Diluted urine- low osmolality, Anibal Marti is >20 SIADH? serotinin drug?weight loss medication? thyroid is controlled. Pt to keep appt with Dr. Rachael Robbins on 3/1/19.  Will CC results to nephrolog

## 2019-03-01 ENCOUNTER — OFFICE VISIT (OUTPATIENT)
Dept: NEPHROLOGY | Facility: CLINIC | Age: 69
End: 2019-03-01
Payer: MEDICARE

## 2019-03-01 VITALS — WEIGHT: 227 LBS | DIASTOLIC BLOOD PRESSURE: 74 MMHG | SYSTOLIC BLOOD PRESSURE: 114 MMHG | BODY MASS INDEX: 40 KG/M2

## 2019-03-01 DIAGNOSIS — E87.1 HYPONATREMIA: Primary | ICD-10-CM

## 2019-03-01 PROCEDURE — 99214 OFFICE O/P EST MOD 30 MIN: CPT | Performed by: INTERNAL MEDICINE

## 2019-03-01 NOTE — TELEPHONE ENCOUNTER
Called the pharmacy and spoke with the tech who states that the pharmacy rec'd the refill and to disregard the refill request.       Medication: DIVALPROEX SODIUM  MG Oral Tablet 24 Hr    Date of last refill: 02/28/19 (#60/0)  [place]

## 2019-03-01 NOTE — PROGRESS NOTES
Nephrology Progress Note    ASSESSMENT/PLAN:        1) Hyponatremia- persistent hyponatremia more recently with Na 129 meq/L is due to a combination of age associated impairment in renal diluting capacity combined with medication effect (Wellbutrin x yrs, tibial vein of left lower extremity ([facility] 10/10/2018   • Arthritis    • DVT (deep venous thrombosis) ([facility] 08/22/2018    left leg posterior tibial   • Esophageal reflux     last EGD [id number]   • Extrinsic asthma, unspecified    • HEADACHES Oral Tab TAKE 1 TABLET(10 MG) BY MOUTH DAILY Disp: 90 tablet Rfl: 1   Montelukast Sodium (SINGULAIR) 10 MG Oral Tab Take 1 tablet (10 mg total) by mouth daily.  Disp: 90 tablet Rfl: 2   OMEPRAZOLE 40 MG Oral Capsule Delayed Release TAKE ONE CAPSULE BY MOUTH Caffeine Concern: Yes          1 tea per day        Exercise: Yes        Seat Belt: Yes        Stress Concern: Yes        Weight Concern: Yes       Family History:  Family History   Problem Relation Age of Onset   • Hypertension Father    • Hypertension Mo

## 2019-03-04 ENCOUNTER — OFFICE VISIT (OUTPATIENT)
Dept: NEUROLOGY | Facility: CLINIC | Age: 69
End: 2019-03-04
Payer: MEDICARE

## 2019-03-04 VITALS
WEIGHT: 227 LBS | RESPIRATION RATE: 16 BRPM | DIASTOLIC BLOOD PRESSURE: 72 MMHG | BODY MASS INDEX: 39.72 KG/M2 | SYSTOLIC BLOOD PRESSURE: 124 MMHG | HEART RATE: 74 BPM | HEIGHT: 63.5 IN

## 2019-03-04 DIAGNOSIS — J30.9 ALLERGIC SINUSITIS: ICD-10-CM

## 2019-03-04 DIAGNOSIS — G43.709 CHRONIC MIGRAINE WITHOUT AURA WITHOUT STATUS MIGRAINOSUS, NOT INTRACTABLE: Primary | ICD-10-CM

## 2019-03-04 PROCEDURE — 99213 OFFICE O/P EST LOW 20 MIN: CPT | Performed by: OTHER

## 2019-03-04 NOTE — PROGRESS NOTES
[facility]   Neurology- follow up    Samanta Parsons Patient Status:  No patient class for patient encounter    1950 MRN [id number]   Location ED [facility], [address], [address] [place] [address] PCP Zaida Baron DO 0.55 - 1.02 mg/dL 0.60   BUN/CREAT Ratio      10.0 - 20.0 16.7   CALCIUM      8.3 - 10.3 mg/dL 8.5   CALCULATED OSMOLALITY      275 - 295 mOsm/kg 266 (L)   GFR, Non-      >=60 94   GFR, -American      >=60 108   AST (SGOT)      15 - 3.6 - 5.1 mmol/L 4.5   Chloride      101 - 111 mmol/L 101   Carbon Dioxide, Total      22.0 - 32.0 mmol/L 27.0       Past Medical History:  Past Medical History:   Diagnosis Date   • Acute deep vein thrombosis (DVT) of tibial vein of left lower extremity ( Comment:Carried over from childhood    MEDICATIONS:    Current Outpatient Medications:   •  divalproex Sodium  MG Oral Tablet 24 Hr, Take 1 tablet (500 mg total) by mouth 2 (two) times daily. , Disp: 60 tablet, Rfl: 0  •  Phendimetrazine Tartrate 35 M 1 capsule by mouth every morning. Indications: supplement, Disp: , Rfl:   •  LORazepam (ATIVAN) 0.5 MG Oral Tab, Take 0.5 mg by mouth every 4 (four) hours as needed.   , Disp: , Rfl:   •  Cholecalciferol (VITAMIN D3) 1000 UNITS Oral Cap, Take 2,000 Units by fasciculations. Manual muscle testing revealed MRC grade 5/5 strength throughout including proximal and distal muscles of the arms and legs. Deep tendon reflexes were 2 at the biceps, brachioradialis, triceps, knee jerk, and ankle jerk.  Plantar responses

## 2019-03-06 ENCOUNTER — OFFICE VISIT (OUTPATIENT)
Dept: HEMATOLOGY/ONCOLOGY | Age: 69
End: 2019-03-06
Attending: INTERNAL MEDICINE
Payer: MEDICARE

## 2019-03-06 VITALS
TEMPERATURE: 96 F | RESPIRATION RATE: 18 BRPM | WEIGHT: 224.5 LBS | SYSTOLIC BLOOD PRESSURE: 121 MMHG | OXYGEN SATURATION: 93 % | HEART RATE: 100 BPM | BODY MASS INDEX: 39 KG/M2 | DIASTOLIC BLOOD PRESSURE: 77 MMHG

## 2019-03-06 DIAGNOSIS — I82.442 ACUTE DEEP VEIN THROMBOSIS (DVT) OF TIBIAL VEIN OF LEFT LOWER EXTREMITY (HCC): Primary | ICD-10-CM

## 2019-03-06 DIAGNOSIS — I82.4Z2 ACUTE DEEP VEIN THROMBOSIS (DVT) OF DISTAL END OF LEFT LOWER EXTREMITY (HCC): ICD-10-CM

## 2019-03-06 LAB — D-DIMER: 1.21 UG/ML FEU (ref 0–0.49)

## 2019-03-06 PROCEDURE — 99213 OFFICE O/P EST LOW 20 MIN: CPT | Performed by: INTERNAL MEDICINE

## 2019-03-06 NOTE — PROGRESS NOTES
Cancer Center Progress Note  Patient Name: Robin Farias   YOB: 1950   Medical Record Number: [id number]   CSN: [id number]   Attending Physician: Cristóbal Munoz M.D.        Date of Visit: 3/6/2019      Chief Complaint:  Patient pre • COLONOSCOPY N/A 4/7/2017    Performed by Arlet Martinez MD at [address]      to the right knee only   • KNEE SURGERY  3/8/2015    • KNEE SURGERY  9/8/2015     3 knee surgeries, replacement and arthoscopy    • OTHER SURG file        Forced sexual activity: Not on file    Other Topics      Concerns:        Caffeine Concern: Yes          1 tea per day        Exercise: Yes        Seat Belt: Yes        Special Diet: Not Asked        Stress Concern: Yes        Weight Concern:  Galindo Luna •  Sertraline HCl 100 MG Oral Tab, TK 1 T PO  QPM, Disp: , Rfl: 0  •  Spacer/Aero-Holding Chambers ([facility]) Does not apply Misc, Use as directed, Disp: , Rfl: 0  •  ClonazePAM 0.5 MG Oral Tab, TK SS T PO BID UTD, Disp: , Rfl: 0  •  Cranberry - Mood and affect appropriate. Appears close to chronological age. Well nourished. Well developed. Eyes Normal - Conjunctivae and sclerae are clear and without icterus.  Pupils are reactive and equal.   Hematologic/Lymphatic Normal - No petechiae or purpu Group

## 2019-03-08 RX ORDER — DIVALPROEX SODIUM 500 MG/1
TABLET, EXTENDED RELEASE ORAL
Qty: 60 TABLET | Refills: 3 | OUTPATIENT
Start: 2019-03-08

## 2019-03-13 ENCOUNTER — TELEPHONE (OUTPATIENT)
Dept: FAMILY MEDICINE CLINIC | Facility: CLINIC | Age: 69
End: 2019-03-13

## 2019-03-13 NOTE — TELEPHONE ENCOUNTER
Looks like previous form under Media tab dated 9/21/18. Pt last seen 1/11/2019    If ok for form office needs to fill out for pt. Please advise.   thanks

## 2019-03-13 NOTE — TELEPHONE ENCOUNTER
Patient called and said she needs to renew her handicap sticker please call her when the paperwork is complete.

## 2019-03-14 ENCOUNTER — MED REC SCAN ONLY (OUTPATIENT)
Dept: FAMILY MEDICINE CLINIC | Facility: CLINIC | Age: 69
End: 2019-03-14

## 2019-03-14 NOTE — TELEPHONE ENCOUNTER
Informed pt form is completed and will be at , pt can  at her convenience.   Pt verbalized understanding    Copy sent to scan

## 2019-03-19 DIAGNOSIS — Z87.19 HISTORY OF GASTRITIS: ICD-10-CM

## 2019-03-19 RX ORDER — OMEPRAZOLE 40 MG/1
CAPSULE, DELAYED RELEASE ORAL
Qty: 90 CAPSULE | Refills: 1 | Status: SHIPPED | OUTPATIENT
Start: 2019-03-19 | End: 2019-09-12

## 2019-03-19 NOTE — TELEPHONE ENCOUNTER
Please advises refill of Omeprazole 40mg. Last Rx: 12/17/18      Future appt:     Your appointments     Date & Time Appointment Department [facility])    Mar 20, 2019  8:50 AM CDT FOLLOW UP with Rowan Boast, MD [address] [place] [address] ( Cholesterol (mg/dL)   Date Value   01/23/2019 43     Triglycerides (mg/dL)   Date Value   01/23/2019 91   05/24/2006 73     Lab Results   Component Value Date     (H) 01/23/2019    A1C 6.1 (H) 01/23/2019     Lab Results   Component Value Date    T4F

## 2019-03-21 ENCOUNTER — TELEPHONE (OUTPATIENT)
Dept: FAMILY MEDICINE CLINIC | Facility: CLINIC | Age: 69
End: 2019-03-21

## 2019-03-21 DIAGNOSIS — Z87.19 HISTORY OF GASTRITIS: ICD-10-CM

## 2019-03-21 RX ORDER — OMEPRAZOLE 40 MG/1
CAPSULE, DELAYED RELEASE ORAL
Qty: 90 CAPSULE | Refills: 1 | Status: CANCELLED | OUTPATIENT
Start: 2019-03-21

## 2019-03-21 NOTE — TELEPHONE ENCOUNTER
Informed pt that if she goes to the [facility] pharmacy she is requesting medication from they can transfer it from pharmacy [facility] where it was originally sent.  Pt verbalized understanding

## 2019-03-21 NOTE — TELEPHONE ENCOUNTER
Pt is no longer using [facility] and wants refill sent to [facility] please. If any questions call back at [phone].

## 2019-03-21 NOTE — TELEPHONE ENCOUNTER
Pt requesting refill of omeprazole, no protocol , unable to approve:     Last Time Medication was Filled:  3/19/19 #90 with 1 refill citlalli altamirano in [address] [place] [address] Visit with PCP: 1/11/19    Future Appointments   Date Time Provider Department

## 2019-03-28 DIAGNOSIS — G43.709 CHRONIC MIGRAINE WITHOUT AURA WITHOUT STATUS MIGRAINOSUS, NOT INTRACTABLE: ICD-10-CM

## 2019-03-28 NOTE — TELEPHONE ENCOUNTER
Medication: DIVALPROEX SODIUM  MG Oral Tablet 24 Hr    Date of last refill: 2/28/19 (#60/0)  [place]     Last office visit: 3/4/2019  Due back to clinic per last office note:  1 year  Date next office visit schedul

## 2019-03-29 RX ORDER — DIVALPROEX SODIUM 500 MG/1
TABLET, EXTENDED RELEASE ORAL
Qty: 60 TABLET | Refills: 0 | Status: SHIPPED | OUTPATIENT
Start: 2019-03-29 | End: 2019-05-01

## 2019-04-01 ENCOUNTER — MED REC SCAN ONLY (OUTPATIENT)
Dept: FAMILY MEDICINE CLINIC | Facility: CLINIC | Age: 69
End: 2019-04-01

## 2019-04-01 NOTE — TELEPHONE ENCOUNTER
Received records from [facility]   -Sleep study report 2013  -CPAP titration report 2016  Sent to scan.

## 2019-05-01 DIAGNOSIS — G43.709 CHRONIC MIGRAINE WITHOUT AURA WITHOUT STATUS MIGRAINOSUS, NOT INTRACTABLE: ICD-10-CM

## 2019-05-01 RX ORDER — DIVALPROEX SODIUM 500 MG/1
TABLET, EXTENDED RELEASE ORAL
Qty: 60 TABLET | Refills: 2 | Status: SHIPPED | OUTPATIENT
Start: 2019-05-01 | End: 2019-07-15

## 2019-05-01 NOTE — TELEPHONE ENCOUNTER
Medication: DIVALPROEX SODIUM  MG Oral Tablet 24 Hr    Date of last refill: 03/29/19 (#60/0)  [place]    Last office visit: 3/4/2019  Due back to clinic per last office note:  Around 03/04/20  Date next office

## 2019-05-08 RX ORDER — FLUTICASONE PROPIONATE 50 MCG
SPRAY, SUSPENSION (ML) NASAL
Qty: 3 BOTTLE | Refills: 0 | Status: SHIPPED | OUTPATIENT
Start: 2019-05-08 | End: 2019-08-08

## 2019-05-08 NOTE — TELEPHONE ENCOUNTER
Pt requesting refill of Fluticasone Nasal Spray, passed protocol , refill approved, sent to pharmacy:     Last Time Medication was Filled:  01/2019    Last Office Visit with PCP: 1/11/2019      Future Appointments   Date Time Provider Seb Montgomery 2 person assist/verbal cues

## 2019-05-18 DIAGNOSIS — I82.442 ACUTE DEEP VEIN THROMBOSIS (DVT) OF TIBIAL VEIN OF LEFT LOWER EXTREMITY (HCC): ICD-10-CM

## 2019-05-20 RX ORDER — RIVAROXABAN 20 MG/1
TABLET, FILM COATED ORAL
Qty: 30 TABLET | Refills: 0 | Status: SHIPPED | OUTPATIENT
Start: 2019-05-20 | End: 2019-06-05

## 2019-05-22 DIAGNOSIS — G43.709 CHRONIC MIGRAINE WITHOUT AURA WITHOUT STATUS MIGRAINOSUS, NOT INTRACTABLE: Primary | ICD-10-CM

## 2019-05-22 NOTE — TELEPHONE ENCOUNTER
Per Epic review, was seen [facility] S-spoke with pt regarding an updated condition. B-seen for migraines. Last seen 3/4/19. A-states that last night around 6, started experiencing headache on top of her head.  Does get worse when she coughs or

## 2019-05-23 RX ORDER — METHYLPREDNISOLONE 4 MG/1
TABLET ORAL
Qty: 1 PACKAGE | Refills: 0 | Status: SHIPPED | OUTPATIENT
Start: 2019-05-23 | End: 2019-05-31 | Stop reason: ALTCHOICE

## 2019-05-23 RX ORDER — ELETRIPTAN HYDROBROMIDE 20 MG/1
TABLET, FILM COATED ORAL
Qty: 9 TABLET | Refills: 0 | Status: SHIPPED | OUTPATIENT
Start: 2019-05-23 | End: 2019-05-31 | Stop reason: ALTCHOICE

## 2019-05-31 ENCOUNTER — OFFICE VISIT (OUTPATIENT)
Dept: FAMILY MEDICINE CLINIC | Facility: CLINIC | Age: 69
End: 2019-05-31
Payer: MEDICARE

## 2019-05-31 VITALS
SYSTOLIC BLOOD PRESSURE: 132 MMHG | OXYGEN SATURATION: 98 % | WEIGHT: 212.63 LBS | HEART RATE: 89 BPM | TEMPERATURE: 98 F | DIASTOLIC BLOOD PRESSURE: 72 MMHG | BODY MASS INDEX: 37 KG/M2 | RESPIRATION RATE: 18 BRPM

## 2019-05-31 DIAGNOSIS — J01.21 ACUTE RECURRENT ETHMOIDAL SINUSITIS: Primary | ICD-10-CM

## 2019-05-31 DIAGNOSIS — R51.9 SINUS HEADACHE: ICD-10-CM

## 2019-05-31 PROCEDURE — 99213 OFFICE O/P EST LOW 20 MIN: CPT | Performed by: FAMILY MEDICINE

## 2019-05-31 RX ORDER — GUAIFENESIN AND DEXTROMETHORPHAN HYDROBROMIDE 1200; 60 MG/1; MG/1
1 TABLET, EXTENDED RELEASE ORAL EVERY 12 HOURS
Qty: 20 TABLET | Refills: 0 | Status: SHIPPED | OUTPATIENT
Start: 2019-05-31 | End: 2019-06-10 | Stop reason: ALTCHOICE

## 2019-05-31 RX ORDER — AMOXICILLIN AND CLAVULANATE POTASSIUM 875; 125 MG/1; MG/1
1 TABLET, FILM COATED ORAL 2 TIMES DAILY
Qty: 20 TABLET | Refills: 0 | Status: SHIPPED | OUTPATIENT
Start: 2019-05-31 | End: 2019-06-10 | Stop reason: ALTCHOICE

## 2019-05-31 NOTE — PATIENT INSTRUCTIONS
As of October 6th 2014, the Drug Enforcement Agency [facility]) is reclassifying all hydrocodone combination medications from Schedule III to Schedule II. This includes medications such as Norco, Vicodin, Lortab, Zohydro, and Vicoprofen.      What this means for clean  · Please increase your fluids and rest.   · PLEASE take all your antibiotic as prescribed or the infection will be ineffectively treated and may return.   · If you develop a rash, hives, itching, throat tightness, shortness-of-breath while on the ant as directed by your healthcare provider. To relieve the pain:  · Keep your sinuses open and free of mucus. Try over-the-counter sinus rinse products. · Use a nasal decongestant as directed to reduce the inflammation.   · Drink fluids to keep the mucus thi may also help soothe symptoms.   · An expectorant with guaifenesin may help thin nasal mucus and help your sinuses drain fluids. · You can use an over-the-counter decongestant, unless a similar medicine was prescribed to you.  Nasal sprays work the fastest go away in 10 days  Prevention  Here are steps you can take to help prevent an infection:  · Keep good hand washing habits. · Don’t have close contact with people who have sore throats, colds, or other upper respiratory infections.   · Don’t smoke, and sta

## 2019-05-31 NOTE — PROGRESS NOTES
CHIEF COMPLAINT: Patient presents with: Follow - Up: Seen at [facility] for headache, cough and congestion. Pt continues with headaches. HPI:     Vini Romero is a 76year old female presents for recheck headache.   Patient was in [place] walk-in clin Hypothyroidism 2011    acquired.  no cancer or nodules   • Migraines    • Normal vaginal delivery     0146,9483   • Other and unspecified hyperlipidemia    • Prediabetes    • Shoulder pain, left    • Sleep apnea    • Thyroid condition    • Unspecified essen Tablet 24 Hr Take 1 tablet (750 mg total) by mouth 2 (two) times daily with meals.  Disp: 60 tablet Rfl: 3   OMEPRAZOLE 40 MG Oral Capsule Delayed Release TAKE ONE CAPSULE BY MOUTH EVERY MORNING AFTER TAKING SYNTHROID Disp: 90 capsule Rfl: 1   atorvastatin SpO2 98%   BMI 37.07 kg/m²   Vital signs reviewed. Appears stated age, well groomed.   Physical Exam  GENERAL: well developed, well nourished, well hydrated, no distress, no dyspnea or stridor,   SKIN: good skin turgor, no obvious rashes  NECK: supple, no a mouth 2 (two) times daily for 10 days. • DM-guaiFENesin ER (MUCINEX DM MAXIMUM STRENGTH)  MG Oral Tablet 12 Hr 20 tablet 0     Sig: Take 1 tablet by mouth every 12 (twelve) hours for 10 days.        Health Maintenance:  Fall Risk Screening due on 0 week (around 6/7/2019), or if symptoms worsen or fail to improve-sooner if needed.

## 2019-06-05 ENCOUNTER — OFFICE VISIT (OUTPATIENT)
Dept: HEMATOLOGY/ONCOLOGY | Age: 69
End: 2019-06-05
Attending: INTERNAL MEDICINE
Payer: MEDICARE

## 2019-06-05 VITALS
DIASTOLIC BLOOD PRESSURE: 75 MMHG | HEART RATE: 79 BPM | SYSTOLIC BLOOD PRESSURE: 139 MMHG | BODY MASS INDEX: 38 KG/M2 | TEMPERATURE: 99 F | OXYGEN SATURATION: 96 % | WEIGHT: 216.5 LBS | RESPIRATION RATE: 18 BRPM

## 2019-06-05 DIAGNOSIS — I82.442 ACUTE DEEP VEIN THROMBOSIS (DVT) OF TIBIAL VEIN OF LEFT LOWER EXTREMITY (HCC): ICD-10-CM

## 2019-06-05 DIAGNOSIS — I82.4Z2 ACUTE DEEP VEIN THROMBOSIS (DVT) OF DISTAL END OF LEFT LOWER EXTREMITY (HCC): ICD-10-CM

## 2019-06-05 PROCEDURE — 99213 OFFICE O/P EST LOW 20 MIN: CPT | Performed by: INTERNAL MEDICINE

## 2019-06-05 NOTE — PROGRESS NOTES
Cancer Center Progress Note  Patient Name: Ralph Morfin   YOB: 1950   Medical Record Number: [id number]   CSN: [id number]   Attending Physician: Clarissa Hendricks M.D.        Date of Visit: 6/5/2019    Chief Complaint:  Patient prese time   • COLONOSCOPY N/A 4/7/2017    Performed by Wyline Ormond, MD at [address]      to the right knee only   • KNEE SURGERY  3/8/2015    • KNEE SURGERY  9/8/2015     3 knee surgeries, replacement and arthoscopy    • OTHER Not on file        Forced sexual activity: Not on file    Other Topics      Concerns:        Caffeine Concern: Yes          1 tea per day        Exercise: Yes        Seat Belt: Yes        Special Diet: Not Asked        Stress Concern: Yes        Weight Con Acetaminophen (TYLENOL ARTHRITIS PAIN OR), Take by mouth., Disp: , Rfl:   •  Sertraline HCl 100 MG Oral Tab, TK 1 T PO  QPM, Disp: , Rfl: 0  •  Spacer/Aero-Holding Chambers (OPTICHAMBER RHONDA) Does not apply Misc, Use as directed, Disp: , Rfl: 0  •  Mabel Signs:   /75 (BP Location: Left arm, Patient Position: Sitting, Cuff Size: large)   Pulse 79   Temp 98.7 °F (37.1 °C) (Tympanic)   Resp 18   Wt 98.2 kg (216 lb 8 oz)   SpO2 96%   BMI 37.75 kg/m²     Physical Examination:    Constitutional Normal - Moo

## 2019-07-15 DIAGNOSIS — G43.709 CHRONIC MIGRAINE WITHOUT AURA WITHOUT STATUS MIGRAINOSUS, NOT INTRACTABLE: ICD-10-CM

## 2019-07-15 RX ORDER — DIVALPROEX SODIUM 500 MG/1
TABLET, EXTENDED RELEASE ORAL
Qty: 180 TABLET | Refills: 0 | Status: SHIPPED | OUTPATIENT
Start: 2019-07-15 | End: 2019-11-19

## 2019-07-15 NOTE — TELEPHONE ENCOUNTER
Medication: DIVALPROEX SODIUM  MG Oral Tablet 24 Hr    Date of last refill: 05/01/19 (#60/2)  [place]    Last office visit: 3/4/2019  Due back to clinic per last office note:  Around 03/04/20  Date next office

## 2019-07-30 DIAGNOSIS — I82.442 ACUTE DEEP VEIN THROMBOSIS (DVT) OF TIBIAL VEIN OF LEFT LOWER EXTREMITY (HCC): ICD-10-CM

## 2019-08-08 DIAGNOSIS — J31.0 NONALLERGIC RHINITIS: Primary | ICD-10-CM

## 2019-08-08 RX ORDER — FLUTICASONE PROPIONATE 50 MCG
SPRAY, SUSPENSION (ML) NASAL
Qty: 48 G | Refills: 1 | Status: SHIPPED | OUTPATIENT
Start: 2019-08-08 | End: 2019-10-07

## 2019-08-08 NOTE — TELEPHONE ENCOUNTER
Pt requesting refill of FLUTICASONE PROPIONATE 50 MCG/ACT Nasal Suspension, passed protocol , refill approved, sent to pharmacy:     Last Time Medication was Filled:  5/8/19    Last Office Visit with PCP: 5/31/19     No future appointments.

## 2019-08-21 DIAGNOSIS — I10 BENIGN ESSENTIAL HTN: ICD-10-CM

## 2019-08-21 DIAGNOSIS — E78.2 MIXED DYSLIPIDEMIA: ICD-10-CM

## 2019-08-22 RX ORDER — ATORVASTATIN CALCIUM 40 MG/1
TABLET, FILM COATED ORAL
Qty: 90 TABLET | Refills: 1 | Status: SHIPPED | OUTPATIENT
Start: 2019-08-22 | End: 2020-02-10

## 2019-08-22 RX ORDER — LISINOPRIL 10 MG/1
TABLET ORAL
Qty: 90 TABLET | Refills: 1 | Status: SHIPPED | OUTPATIENT
Start: 2019-08-22 | End: 2020-02-10

## 2019-08-22 NOTE — TELEPHONE ENCOUNTER
Both protocol passed, refills sent.     LOV:   Future OV:  Last Rx: 1/11/19, #90 with 1 refill  Last labs: Lipid, CMP 1/23/19

## 2019-09-04 ENCOUNTER — HOSPITAL ENCOUNTER (OUTPATIENT)
Age: 69
Discharge: HOME | End: 2019-09-04
Attending: FAMILY MEDICINE
Payer: MEDICARE

## 2019-09-04 ENCOUNTER — OFFICE VISIT (OUTPATIENT)
Dept: HEMATOLOGY/ONCOLOGY | Age: 69
End: 2019-09-04
Attending: INTERNAL MEDICINE
Payer: MEDICARE

## 2019-09-04 VITALS
HEART RATE: 85 BPM | DIASTOLIC BLOOD PRESSURE: 73 MMHG | RESPIRATION RATE: 17 BRPM | SYSTOLIC BLOOD PRESSURE: 148 MMHG | OXYGEN SATURATION: 97 % | TEMPERATURE: 97 F

## 2019-09-04 VITALS
BODY MASS INDEX: 37 KG/M2 | DIASTOLIC BLOOD PRESSURE: 75 MMHG | WEIGHT: 214.38 LBS | OXYGEN SATURATION: 96 % | SYSTOLIC BLOOD PRESSURE: 125 MMHG | HEART RATE: 100 BPM | RESPIRATION RATE: 20 BRPM | TEMPERATURE: 97 F

## 2019-09-04 DIAGNOSIS — S39.012A STRAIN OF LUMBAR REGION, INITIAL ENCOUNTER: Primary | ICD-10-CM

## 2019-09-04 DIAGNOSIS — I82.4Z2 ACUTE DEEP VEIN THROMBOSIS (DVT) OF DISTAL END OF LEFT LOWER EXTREMITY (HCC): ICD-10-CM

## 2019-09-04 LAB — D-DIMER: 1.08 UG/ML FEU (ref ?–0.68)

## 2019-09-04 PROCEDURE — 99213 OFFICE O/P EST LOW 20 MIN: CPT | Performed by: INTERNAL MEDICINE

## 2019-09-04 PROCEDURE — 99213 OFFICE O/P EST LOW 20 MIN: CPT

## 2019-09-04 PROCEDURE — 99214 OFFICE O/P EST MOD 30 MIN: CPT

## 2019-09-04 RX ORDER — METHYLPREDNISOLONE 4 MG/1
TABLET ORAL
Qty: 1 PACKAGE | Refills: 0 | Status: SHIPPED | OUTPATIENT
Start: 2019-09-04 | End: 2019-09-09 | Stop reason: ALTCHOICE

## 2019-09-04 RX ORDER — TIZANIDINE 2 MG/1
2 TABLET ORAL NIGHTLY
Qty: 10 TABLET | Refills: 0 | Status: SHIPPED | OUTPATIENT
Start: 2019-09-04 | End: 2019-09-09 | Stop reason: ALTCHOICE

## 2019-09-04 NOTE — PROGRESS NOTES
Cancer Center Progress Note  Patient Name: Car Yun   YOB: 1950   Medical Record Number: [id number]   CSN: [id number]   Attending Physician: Bryan Enriquez M.D.        Date of Visit: 9/4/2019    Chief Complaint:  No chief comp EGD same time   • COLONOSCOPY N/A 4/7/2017    Performed by Mariposa Larkin MD at [address]      to the right knee only   • KNEE SURGERY  3/8/2015    • KNEE SURGERY  9/8/2015     3 knee surgeries, replacement and arthoscopy Physically abused: Not on file        Forced sexual activity: Not on file    Other Topics      Concerns:        Caffeine Concern: Yes          1 tea per day        Exercise: Yes        Seat Belt: Yes        Special Diet: Not Asked        Stress Concern:  Xiomara Whitmore Oral Tab, TK 1 T PO  QPM, Disp: , Rfl: 0  •  Spacer/Aero-Holding Chambers (OPTICHAMBER RHONDA) Does not apply Misc, Use as directed, Disp: , Rfl: 0  •  ClonazePAM 0.5 MG Oral Tab, TK SS T PO BID UTD, Disp: , Rfl: 0  •  Cranberry 125 MG Oral Tab, Take by m affect appropriate. Appears close to chronological age. Well nourished. Well developed. Eyes Normal - Conjunctivae and sclerae are clear and without icterus. Pupils are reactive and equal.   Hematologic/Lymphatic Normal - No petechiae or purpura.   No ten

## 2019-09-04 NOTE — ED PROVIDER NOTES
Patient Seen in: [facility]    History   Patient presents with:  Back Pain (musculoskeletal)    Stated Complaint: BACK SPASMS 3 DAYS    HPI    43-year-old female presents with complaints of lower back pain mostly in the right si arthoscopy    • OTHER SURGICAL HISTORY  12/2012    right arm carpal tunnel                Family history reviewed with patient/caregiver and is not pertinent to presenting problem.     Social History    Tobacco Use      Smoking status: Never Smoker      Smo plan.            Disposition and Plan     Clinical Impression:  Strain of lumbar region, initial encounter  (primary encounter diagnosis)    Disposition:  Discharge  9/4/2019 11:57 am    Follow-up:  Yeni Roman  [address] [place] [address] [phone]

## 2019-09-04 NOTE — ED INITIAL ASSESSMENT (HPI)
Pt c/o right sided low back spasms since Monday  Better yesterday, went to work. Spasm returned yesterday evening.   Discomfort does not radiate

## 2019-09-05 ENCOUNTER — TELEPHONE (OUTPATIENT)
Dept: FAMILY MEDICINE CLINIC | Facility: CLINIC | Age: 69
End: 2019-09-05

## 2019-09-05 NOTE — TELEPHONE ENCOUNTER
Left message for patient advising she call [facility] to inform them she accidentally threw out the medication that they gave her and ask if they might be able to re-write the script for her.      Recommended she keep scheduled appointment to further discuss kayli

## 2019-09-05 NOTE — TELEPHONE ENCOUNTER
Patient called asking if Johnie Edward could give her some pain medication for her back. She was seen in the  IC on 9/4/19 and accidentally tossed the medication in the garbage when she threw  the bag out.   She has an appointment on Monday 9/9/19 and wan

## 2019-09-09 ENCOUNTER — OFFICE VISIT (OUTPATIENT)
Dept: FAMILY MEDICINE CLINIC | Facility: CLINIC | Age: 69
End: 2019-09-09
Payer: MEDICARE

## 2019-09-09 VITALS
BODY MASS INDEX: 37.27 KG/M2 | WEIGHT: 213 LBS | OXYGEN SATURATION: 98 % | RESPIRATION RATE: 20 BRPM | SYSTOLIC BLOOD PRESSURE: 108 MMHG | HEART RATE: 105 BPM | TEMPERATURE: 98 F | DIASTOLIC BLOOD PRESSURE: 66 MMHG | HEIGHT: 63.5 IN

## 2019-09-09 DIAGNOSIS — Z96.651 PAIN DUE TO TOTAL RIGHT KNEE REPLACEMENT, SUBSEQUENT ENCOUNTER: ICD-10-CM

## 2019-09-09 DIAGNOSIS — S39.012A BACK STRAIN, INITIAL ENCOUNTER: Primary | ICD-10-CM

## 2019-09-09 DIAGNOSIS — M17.12 PRIMARY OSTEOARTHRITIS OF LEFT KNEE: ICD-10-CM

## 2019-09-09 DIAGNOSIS — T84.84XD PAIN DUE TO TOTAL RIGHT KNEE REPLACEMENT, SUBSEQUENT ENCOUNTER: ICD-10-CM

## 2019-09-09 PROCEDURE — 99213 OFFICE O/P EST LOW 20 MIN: CPT | Performed by: FAMILY MEDICINE

## 2019-09-09 PROCEDURE — G0008 ADMIN INFLUENZA VIRUS VAC: HCPCS | Performed by: FAMILY MEDICINE

## 2019-09-09 PROCEDURE — 90662 IIV NO PRSV INCREASED AG IM: CPT | Performed by: FAMILY MEDICINE

## 2019-09-09 RX ORDER — TIZANIDINE 2 MG/1
2 TABLET ORAL NIGHTLY
Qty: 10 TABLET | Refills: 0 | Status: SHIPPED | OUTPATIENT
Start: 2019-09-09 | End: 2019-10-09 | Stop reason: ALTCHOICE

## 2019-09-09 NOTE — PROGRESS NOTES
CHIEF COMPLAINT: Patient presents with:  Back Pain: x 1 week     HPI:     Paz Villalobos is a 76year old female presents for low back pain left side that started on Labor Day a week ago with shooting pains paravertebral lumbar area but not radiating i hemorrhoids- repeat colonscopy 4/7/2017 negative- repeat in 5 years. • Hypothyroidism 2011    acquired.  no cancer or nodules   • Migraines    • Normal vaginal delivery     53,1214   • Other and unspecified hyperlipidemia    • Prediabetes    • Shoulder (750 MG TOTAL) BY MOUTH 2 (TWO) TIMES DAILY WITH MEALS.  Disp: 180 tablet Rfl: 0   DIVALPROEX SODIUM  MG Oral Tablet 24 Hr TAKE 1 TABLET BY MOUTH TWICE A DAY Disp: 180 tablet Rfl: 0   Phendimetrazine Tartrate 35 MG Oral Tab TAKE BY MOUTH 2 IN THE [facility] adult)   Pulse 105   Temp 97.6 °F (36.4 °C) (Oral)   Resp 20   Ht 63.5\"   Wt 213 lb   LMP  (Exact Date)   SpO2 98%   BMI 37.14 kg/m²   Vital signs reviewed. Appears stated age, well groomed. Physical Exam  General: Well-nourished, well hydrated.  No acute encounter  - tiZANidine HCl 2 MG Oral Tab; Take 1 tablet (2 mg total) by mouth nightly. Avoid alcohol. Do not take with lorazepam. Causes sedation. no drive for 8hr after ingestion  Dispense: 10 tablet;  Refill: 0  Risks and benefits of tizanidine discussed Obstructive sleep apnea syndrome     Irritable colon     Diverticulosis of large intestine without hemorrhage     Prediabetes     Acquired hypothyroidism     Benign essential HTN     Major depression in full remission (HCC)     Chronic migraine without aur

## 2019-09-09 NOTE — PATIENT INSTRUCTIONS
.lwin  Treating Strains and Sprains    Strains and sprains happen when muscles or other soft tissues near your bones stretch or tear. These injuries can cause bruising, swelling, and pain.  To ease your discomfort and speed the healing of your strain or spr medical care. Always follow your healthcare professional's instructions. Back Sprain or Strain    Injury to the muscles (strain) or ligaments (sprain) around the spine can be troubling.  Injury may occur after a sudden forceful twisting or bending madsen Don't use a heating pad while sleeping. It can burn the skin. · You can alternate the ice and heat. Talk with your healthcare provider to find out the best treatment or therapy for your back pain.   · Therapeutic massage can help relax the back muscles wit advice  Call your healthcare provider right away if any of the following occur:  · Pain gets worse or spreads to your arms or legs  · Weakness or numbness in one or both arms or legs  · Numbness in the groin or genital area  Date Last Reviewed: 6/1/2016  ©

## 2019-09-12 DIAGNOSIS — Z87.19 HISTORY OF GASTRITIS: ICD-10-CM

## 2019-09-12 RX ORDER — OMEPRAZOLE 40 MG/1
CAPSULE, DELAYED RELEASE ORAL
Qty: 90 CAPSULE | Refills: 0 | Status: SHIPPED | OUTPATIENT
Start: 2019-09-12 | End: 2019-10-07

## 2019-09-12 NOTE — TELEPHONE ENCOUNTER
Requested Prescriptions     Pending Prescriptions Disp Refills   • OMEPRAZOLE 40 MG Oral Capsule Delayed Release [Pharmacy Med Name: OMEPRAZOLE DR 40 MG CAPSULE] 90 capsule 1     Sig: TAKE ONE CAPSULE BY MOUTH EVERY DAY AM/AFTER [address] [facility]

## 2019-09-24 ENCOUNTER — OFFICE VISIT (OUTPATIENT)
Dept: FAMILY MEDICINE CLINIC | Facility: CLINIC | Age: 69
End: 2019-09-24
Payer: MEDICARE

## 2019-09-24 VITALS
WEIGHT: 211 LBS | SYSTOLIC BLOOD PRESSURE: 116 MMHG | BODY MASS INDEX: 36.92 KG/M2 | DIASTOLIC BLOOD PRESSURE: 66 MMHG | RESPIRATION RATE: 20 BRPM | OXYGEN SATURATION: 98 % | HEIGHT: 63.5 IN | HEART RATE: 106 BPM | TEMPERATURE: 101 F

## 2019-09-24 DIAGNOSIS — J02.0 STREP PHARYNGITIS: Primary | ICD-10-CM

## 2019-09-24 DIAGNOSIS — J02.9 SORE THROAT: ICD-10-CM

## 2019-09-24 LAB
CONTROL LINE PRESENT WITH A CLEAR BACKGROUND (YES/NO): YES YES/NO
STREP GRP A CUL-SCR: POSITIVE

## 2019-09-24 PROCEDURE — 99213 OFFICE O/P EST LOW 20 MIN: CPT | Performed by: NURSE PRACTITIONER

## 2019-09-24 PROCEDURE — 87880 STREP A ASSAY W/OPTIC: CPT | Performed by: NURSE PRACTITIONER

## 2019-09-24 RX ORDER — AMOXICILLIN 500 MG/1
500 CAPSULE ORAL 2 TIMES DAILY
Qty: 20 CAPSULE | Refills: 0 | Status: SHIPPED | OUTPATIENT
Start: 2019-09-24 | End: 2019-10-04

## 2019-09-25 NOTE — PATIENT INSTRUCTIONS
Pharyngitis: Strep (Confirmed)    You have had a positive test for strep throat. Strep throat is a contagious illness. It is spread by coughing, kissing or by touching others after touching your mouth or nose.  Symptoms include throat pain that is worse w · Lymph nodes getting larger or becoming soft in the middle  · You can't swallow liquids or you can't open your mouth wide because of throat pain  · Signs of dehydration. These include very dark urine or no urine, sunken eyes, and dizziness.   · Trouble luis angel

## 2019-09-25 NOTE — PROGRESS NOTES
CHIEF COMPLAINT:   Patient presents with:  Sore Throat: sx x 1 day. HPI:   German Loaiza is a 76year old female presents to clinic with symptoms of sore throat. Patient has had for 2 days.  Symptoms have been persisting and worsening since onset LEVOTHYROXINE SODIUM 75 MCG Oral Tab TAKE 1 TABLET BY MOUTH EVERY DAY Disp: 90 tablet Rfl: 1   Acetaminophen (TYLENOL ARTHRITIS PAIN OR) Take by mouth.  Disp:  Rfl:    Sertraline HCl 100 MG Oral Tab TK 1 T PO  QPM Disp:  Rfl: 0   Spacer/Aero-Holding Chamber RESPIRATORY: denies shortness of breath, or wheezing  CARDIOVASCULAR: denies chest pain, palpitations   GI: denies abdominal pain, constipation and diarrhea  NEURO: denies dizziness or lightheadedness    EXAM:   /66 (BP Location: Right arm, Patient P Sig: Take 1 capsule (500 mg total) by mouth 2 (two) times daily for 10 days. Imaging & Consults:  None        Plan:  Comfort Measures discussed and listed in Patient Instructions. Prescription: as below.      Requested Prescriptions     Signed Presc · Take antibiotic medicine for the full 10 days, even if you feel better.  This is very important to ensure the infection is treated. It is also important to prevent medicine-resistant germs from developing. If you were given an antibiotic shot, you don't n © 5774-9273 [employer] [address]. [address] [facility], [facility] [place]. All rights reserved. This information is not intended as a substitute for professional medical care. Always follow your healthcare professional's instructions.

## 2019-10-03 ENCOUNTER — TELEPHONE (OUTPATIENT)
Dept: FAMILY MEDICINE CLINIC | Facility: CLINIC | Age: 69
End: 2019-10-03

## 2019-10-03 NOTE — TELEPHONE ENCOUNTER
LMOM to return call to the office. Provided pt office phone [phone] along with office hours given. Left detailed message that if she is starting to feel better and does not have a fever that is good. Give it a few more days to improve.  If it does

## 2019-10-03 NOTE — TELEPHONE ENCOUNTER
Pt is calling stating she came in through the [facility] for a sore throat where she was told she had strep and was given medication, but pt states today is the last day of her medication and she still has some pain left in her throat.  Pt wants to know if she juliano

## 2019-10-07 ENCOUNTER — OFFICE VISIT (OUTPATIENT)
Dept: FAMILY MEDICINE CLINIC | Facility: CLINIC | Age: 69
End: 2019-10-07
Payer: MEDICARE

## 2019-10-07 VITALS
WEIGHT: 214.19 LBS | BODY MASS INDEX: 37.48 KG/M2 | HEIGHT: 63.5 IN | TEMPERATURE: 98 F | OXYGEN SATURATION: 98 % | SYSTOLIC BLOOD PRESSURE: 112 MMHG | DIASTOLIC BLOOD PRESSURE: 64 MMHG | RESPIRATION RATE: 18 BRPM | HEART RATE: 106 BPM

## 2019-10-07 DIAGNOSIS — J02.9 SORE THROAT: Primary | ICD-10-CM

## 2019-10-07 DIAGNOSIS — J30.1 SEASONAL ALLERGIC RHINITIS DUE TO POLLEN: ICD-10-CM

## 2019-10-07 DIAGNOSIS — Z87.19 HISTORY OF GASTRITIS: ICD-10-CM

## 2019-10-07 DIAGNOSIS — M17.12 PRIMARY OSTEOARTHRITIS OF LEFT KNEE: ICD-10-CM

## 2019-10-07 DIAGNOSIS — J31.0 NONALLERGIC RHINITIS: ICD-10-CM

## 2019-10-07 PROCEDURE — 87081 CULTURE SCREEN ONLY: CPT | Performed by: FAMILY MEDICINE

## 2019-10-07 PROCEDURE — 99214 OFFICE O/P EST MOD 30 MIN: CPT | Performed by: FAMILY MEDICINE

## 2019-10-07 PROCEDURE — 87880 STREP A ASSAY W/OPTIC: CPT | Performed by: FAMILY MEDICINE

## 2019-10-07 RX ORDER — OMEPRAZOLE 40 MG/1
CAPSULE, DELAYED RELEASE ORAL
Qty: 90 CAPSULE | Refills: 2 | Status: SHIPPED | OUTPATIENT
Start: 2019-10-07 | End: 2020-09-02

## 2019-10-07 RX ORDER — FLUTICASONE PROPIONATE 50 MCG
SPRAY, SUSPENSION (ML) NASAL
Qty: 48 G | Refills: 1 | Status: SHIPPED | OUTPATIENT
Start: 2019-10-07 | End: 2020-04-22

## 2019-10-07 RX ORDER — MONTELUKAST SODIUM 10 MG/1
10 TABLET ORAL
Qty: 90 TABLET | Refills: 3 | Status: SHIPPED | OUTPATIENT
Start: 2019-10-07 | End: 2020-10-12

## 2019-10-07 NOTE — PROGRESS NOTES
CHIEF COMPLAINT: Patient presents with:  Sore Throat: Continues with sore throat     HPI:     Car Yun is a 76year old female presents for right side throat discomfort x 1-2  Days.  Recent + strep throat finished ABX - amoxil 4 days ago- complete uses Cpap      Past Surgical History:   Procedure Laterality Date   • APPENDECTOMY  1964   • CHOLECYSTECTOMY  1993   • COLONOSCOPY  2012    [facility]- diverticulosis , polyps.  had EGD same time   • COLONOSCOPY N/A 4/7/2017    Performed by Tessa Carroll SPRAY 2 SPRAYS INTO EACH NOSTRIL EVERY DAY Disp: 48 g Rfl: 1   Rivaroxaban (XARELTO) 20 MG Oral Tab TAKE 1 TABLET (20 MG TOTAL) BY MOUTH DAILY WITH FOOD.  Disp: 30 tablet Rfl: 2   METFORMIN HCL  MG Oral Tablet 24 Hr TAKE 1 TABLET (750 MG TOTAL) BY TERESA Well-nourished, well hydrated. No acute distress. No pallor. No tachypnea. No stridor. HEENT: normocephaly, atraumatic. Sclera clear and non icteric bilaterally. Bilateral intact tympanic membranes without fluid and redness.  Good cone of light bilaterall biofeedback, meditation, PT is other available options for pain. Is still debating if wants to proceed with total knee replacement  May use Tylenol arthritis 650 mg 1-2 tabs every 8 hours-max is 4000 mg daily    3.  Seasonal allergic rhinitis due to pollen pain     Mixed dyslipidemia     History of gastritis     Left carpal tunnel syndrome     Obstructive sleep apnea syndrome     Irritable colon     Diverticulosis of large intestine without hemorrhage     Prediabetes     Acquired hypothyroidism     Benign es

## 2019-10-07 NOTE — PATIENT INSTRUCTIONS
As of October 6th 2014, the Drug Enforcement Agency [facility]) is reclassifying all hydrocodone combination medications from Schedule III to Schedule II. This includes medications such as Norco, Vicodin, Lortab, Zohydro, and Vicoprofen.      What this means for eyes. It’s often known as nasal allergies. Nasal allergies are often due to things in the environment that are breathed in. Depending what you are sensitive to, nasal allergies may occur only during certain seasons. Or they may occur year round.  Common ind once or twice a week. If possible, use a vacuum with a high-efficiency particulate air (HEPA) filter. · Do not smoke. Avoid cigarette smoke. Cigarette smoke is an irritant that can make symptoms worse.   Follow-up care  Follow up as advised by the [facility] chemicals irritate nearby nasal tissue. This causes nasal allergy symptoms. Common nasal allergy symptoms  Allergies can cause nasal tissue to swell. This makes the air passages smaller. The nose may feel stuffed up.  The nose may also make extra mucus, wh Keeping a positive outlook can help you manage pain from day to day. And making time each day to relax and enjoy yourself may help you control osteoarthritis pain, instead of letting it control you.  Try these methods to help you cope with, and even reduce, cold pack  Be careful when using heat or cold. You can injure your skin. Each treatment should only last for 10 to 20 minutes. Your healthcare provider or therapist can give you specific instructions. Acupuncture  Acupuncture is a 3000-year-old practice. your healthcare provider for more information about these therapies. For more information about many of these methods, contact the [facility] ([facility]) at  [email or web address].   Date Last Reviewed: 6/1/2018 [address]. [address] [facility] [place], [address] [place] [address]. All rights reserved. This information is not intended as a substitute for professional medical care. Always follow your healthcare professional's instructions.

## 2019-10-10 ENCOUNTER — TELEPHONE (OUTPATIENT)
Dept: FAMILY MEDICINE CLINIC | Facility: CLINIC | Age: 69
End: 2019-10-10

## 2019-10-10 NOTE — TELEPHONE ENCOUNTER
----- Message from Sharon Lloyd DO sent at 10/9/2019 10:38 AM CDT -----  Results reviewed. Released to [address] Tests show no significant abnormalities.

## 2019-10-10 NOTE — TELEPHONE ENCOUNTER
Left detailed message informing patient that throat culture was negative and to contact office with additional questions.

## 2019-10-21 ENCOUNTER — TELEPHONE (OUTPATIENT)
Dept: HEMATOLOGY/ONCOLOGY | Facility: HOSPITAL | Age: 69
End: 2019-10-21

## 2019-10-21 NOTE — TELEPHONE ENCOUNTER
Pt has appointment on 11/6. Left VM for pt to notify her that 2 weeks worth of Xarleto 20 mg samples are ready for pickup at the  in [facility]. Requested that pt call back with any questions.

## 2019-10-21 NOTE — TELEPHONE ENCOUNTER
Patient was getting samples of zorelto from Dr. Cayla Hill and will run our before appointment. Want's to know if she can stop to  more samples this week Wed. Oct. 23,rd.  Can't afford to pay for medication

## 2019-10-23 ENCOUNTER — OFFICE VISIT (OUTPATIENT)
Dept: FAMILY MEDICINE CLINIC | Facility: CLINIC | Age: 69
End: 2019-10-23
Payer: MEDICARE

## 2019-10-23 ENCOUNTER — TELEPHONE (OUTPATIENT)
Dept: FAMILY MEDICINE CLINIC | Facility: CLINIC | Age: 69
End: 2019-10-23

## 2019-10-23 VITALS
DIASTOLIC BLOOD PRESSURE: 80 MMHG | OXYGEN SATURATION: 97 % | RESPIRATION RATE: 18 BRPM | SYSTOLIC BLOOD PRESSURE: 118 MMHG | TEMPERATURE: 98 F | HEART RATE: 100 BPM

## 2019-10-23 DIAGNOSIS — M25.551 PAIN OF RIGHT HIP JOINT: Primary | ICD-10-CM

## 2019-10-23 PROCEDURE — 99213 OFFICE O/P EST LOW 20 MIN: CPT | Performed by: NURSE PRACTITIONER

## 2019-10-23 RX ORDER — CYCLOBENZAPRINE HCL 10 MG
10 TABLET ORAL 3 TIMES DAILY PRN
Qty: 15 TABLET | Refills: 0 | Status: SHIPPED | OUTPATIENT
Start: 2019-10-23 | End: 2019-11-15

## 2019-10-23 NOTE — PATIENT INSTRUCTIONS
Hip Strain    You have a strain of the muscles around the hip joint. A muscle strain is a stretching or tearing of muscle fibers. This causes pain, especially when you move that muscle. There may also be some swelling and bruising.   Home care  · Stay off · Losing the ability to put weight on the injured side  Date Last Reviewed: 5/1/2018  © 9804-3111 [employer] [address]. [address] [facility] [place], [address] [place] [address]. All rights reserved.  This information is not intended as a substitute for professional m

## 2019-10-23 NOTE — PROGRESS NOTES
CHIEF COMPLAINT:     Patient presents with:  Hip Pain      HPI:   Ирина Schmidt is a 76year old female who is here for complaints of right hip/groin and buttock pain. Pain is located at right hip/groin. . Pain is described as sharp.  Severity is mode Montelukast Sodium 10 MG Oral Tab, Take 1 tablet (10 mg total) by mouth once daily. , Disp: 90 tablet, Rfl: 3  Omeprazole 40 MG Oral Capsule Delayed Release, TAKE ONE CAPSULE BY MOUTH EVERY DAY AM/AFTER TAKIONG SYNTHROID, Disp: 90 capsule, Rfl: 2  Fluticaso • Extrinsic asthma, unspecified    • HEADACHES     uses depakote   • History of colonoscopy with polypectomy 2012    hemorrhoids- repeat colonscopy 4/7/2017 negative- repeat in 5 years. • Hypothyroidism 2011    acquired.  no cancer or nodules   • Migraine Twin Salcido is a 76year old female who presents with complaints of back pain. Findings are consistent with hip pain, likely strain from over doing it the past week.+ localized tenderness/reproducible pain. See pt hand out for pt instructions.  Will · Apply an ice pack over the injured area for 15 to 20 minutes every 3 to 6 hours. Do this for the first 24 to 48 hours. You can make an ice pack by filling a plastic bag that seals at the top with ice cubes and then wrapping it with a thin towel.  Be caref

## 2019-10-23 NOTE — TELEPHONE ENCOUNTER
Difficult walking x 1 week. Tyl Arthritis not helping. Ice not helping. Skin color good skin temperature is normal.  Believes it is related to knee surgery and walking funny.  Unable attend apts avail with Dr Garcia Fulton, will go to [facility]

## 2019-10-23 NOTE — TELEPHONE ENCOUNTER
Pt is calling stating she is having pain between her leg and groin area and wants to know what she should do.

## 2019-10-25 ENCOUNTER — HOSPITAL ENCOUNTER (OUTPATIENT)
Dept: GENERAL RADIOLOGY | Age: 69
Discharge: HOME | End: 2019-10-25
Attending: FAMILY MEDICINE
Payer: MEDICARE

## 2019-10-25 ENCOUNTER — OFFICE VISIT (OUTPATIENT)
Dept: FAMILY MEDICINE CLINIC | Facility: CLINIC | Age: 69
End: 2019-10-25
Payer: MEDICARE

## 2019-10-25 VITALS
BODY MASS INDEX: 37.27 KG/M2 | OXYGEN SATURATION: 99 % | HEIGHT: 63.5 IN | HEART RATE: 94 BPM | RESPIRATION RATE: 18 BRPM | SYSTOLIC BLOOD PRESSURE: 124 MMHG | DIASTOLIC BLOOD PRESSURE: 66 MMHG | TEMPERATURE: 97 F | WEIGHT: 213 LBS

## 2019-10-25 DIAGNOSIS — M25.551 RIGHT HIP PAIN: Primary | ICD-10-CM

## 2019-10-25 DIAGNOSIS — M25.551 RIGHT HIP PAIN: ICD-10-CM

## 2019-10-25 PROCEDURE — 99214 OFFICE O/P EST MOD 30 MIN: CPT | Performed by: FAMILY MEDICINE

## 2019-10-25 PROCEDURE — 73502 X-RAY EXAM HIP UNI 2-3 VIEWS: CPT | Performed by: FAMILY MEDICINE

## 2019-10-25 RX ORDER — PREDNISONE 20 MG/1
40 TABLET ORAL DAILY
Qty: 10 TABLET | Refills: 0 | Status: SHIPPED | OUTPATIENT
Start: 2019-10-25 | End: 2019-10-30

## 2019-10-25 NOTE — PROGRESS NOTES
CHIEF COMPLAINT: Patient presents with:  Groin Pain: right side x 1 week        HPI:     Car Yun is a 76year old female presents for right hip pain.     Yuki Palm is a 77 yo F with PMH of prediabetes and PMH Right TKA in 2015 p/w right hip pain for • History of colonoscopy with polypectomy 2012    hemorrhoids- repeat colonscopy 4/7/2017 negative- repeat in 5 years. • Hypothyroidism 2011    acquired.  no cancer or nodules   • Migraines    • Normal vaginal delivery     3172,4412   • Other and unspec Disp: 90 tablet, Rfl: 3  metFORMIN HCl  MG Oral Tablet 24 Hr, Take 1 tablet (750 mg total) by mouth 2 (two) times daily with meals. , Disp: 180 tablet, Rfl: 3  Diclofenac Sodium 3 % Transdermal Gel, Place 1 Application onto the skin 4 (four) times vikki Allergies:    Sulfa Antibiotics       UNKNOWN    Comment:Carried over from childhood    PSFH elements reviewed from today and agreed except as otherwise stated in HPI.  ROS:     Review of Systems   Constitutional: Negative for chills, fatigue and f 09/24/2019   Component Date Value   • Strep Grp A Screen 09/24/2019 Positive    • Control Line Present wit* 09/24/2019 Yes    • Kit Lot # 09/24/2019 [id number]    • Kit Expiration Date 09/24/2019 12/31/20    Office Visit on 09/04/2019   Component Date Valu apnea syndrome     Irritable colon     Diverticulosis of large intestine without hemorrhage     Prediabetes     Acquired hypothyroidism     Benign essential HTN     Major depression in full remission (HCC)     Chronic migraine without aura without status m

## 2019-10-25 NOTE — PATIENT INSTRUCTIONS
Hip Strain    You have a strain of the muscles around the hip joint. A muscle strain is a stretching or tearing of muscle fibers. This causes pain, especially when you move that muscle. There may also be some swelling and bruising.   Home care  · Stay off · Losing the ability to put weight on the injured side  Date Last Reviewed: 5/1/2018  © 4853-9439 [employer] [address]. [address] [facility], [address] [place] [address]. All rights reserved.  This information is not intended as a substitute for professional m

## 2019-11-05 DIAGNOSIS — I82.4Y9 DEEP VEIN THROMBOSIS (DVT) OF PROXIMAL LOWER EXTREMITY, UNSPECIFIED CHRONICITY, UNSPECIFIED LATERALITY (HCC): Primary | ICD-10-CM

## 2019-11-06 ENCOUNTER — OFFICE VISIT (OUTPATIENT)
Dept: HEMATOLOGY/ONCOLOGY | Age: 69
End: 2019-11-06
Attending: INTERNAL MEDICINE
Payer: MEDICARE

## 2019-11-06 VITALS
WEIGHT: 223.38 LBS | DIASTOLIC BLOOD PRESSURE: 89 MMHG | SYSTOLIC BLOOD PRESSURE: 153 MMHG | RESPIRATION RATE: 20 BRPM | HEART RATE: 121 BPM | TEMPERATURE: 98 F | BODY MASS INDEX: 39 KG/M2 | OXYGEN SATURATION: 96 %

## 2019-11-06 DIAGNOSIS — I82.4Y9 DEEP VEIN THROMBOSIS (DVT) OF PROXIMAL LOWER EXTREMITY, UNSPECIFIED CHRONICITY, UNSPECIFIED LATERALITY (HCC): ICD-10-CM

## 2019-11-06 PROCEDURE — 99213 OFFICE O/P EST LOW 20 MIN: CPT | Performed by: INTERNAL MEDICINE

## 2019-11-06 NOTE — PROGRESS NOTES
Cancer Center Progress Note  Patient Name: Yash Gonzales   YOB: 1950   Medical Record Number: [id number]   CSN: [id number]   Attending Physician: Rl Bonilla M.D.        Date of Visit: 11/6/2019      Chief Complaint:  Patient pr • COLONOSCOPY  2012    [facility]- diverticulosis , polyps.  had EGD same time   • COLONOSCOPY N/A 4/7/2017    Performed by Santi Delatorre MD at [address]      to the right knee only   • KNEE SURGERY  3/8/2015    • KNEE ERIC partner: Not on file        Emotionally abused: Not on file        Physically abused: Not on file        Forced sexual activity: Not on file    Other Topics      Concerns:        Caffeine Concern: Yes          1 tea per day        Exercise: Yes        Seat ATORVASTATIN 40 MG Oral Tab, TAKE 1 TABLET BY MOUTH EVERY NIGHT.  AVOID GRAPEFRUIT JUICE, Disp: 90 tablet, Rfl: 1  •  Rivaroxaban (XARELTO) 20 MG Oral Tab, TAKE 1 TABLET (20 MG TOTAL) BY MOUTH DAILY WITH FOOD., Disp: 30 tablet, Rfl: 2  •  DIVALPROEX SODIUM No insomnia, depression, anel or mood swings.          Vital Signs:  /89 (BP Location: Left arm, Patient Position: Sitting, Cuff Size: large)   Pulse (!) 121   Temp 98.2 °F (36.8 °C) (Tympanic)   Resp 20   Wt 101.3 kg (223 lb 6.4 oz)   SpO2 96%   BMI treatment was explained to the patient and the family. Electronically Signed by: [facility]

## 2019-11-09 ENCOUNTER — OFFICE VISIT (OUTPATIENT)
Dept: FAMILY MEDICINE CLINIC | Facility: CLINIC | Age: 69
End: 2019-11-09
Payer: MEDICARE

## 2019-11-09 VITALS
OXYGEN SATURATION: 99 % | HEIGHT: 63.5 IN | DIASTOLIC BLOOD PRESSURE: 66 MMHG | WEIGHT: 220 LBS | BODY MASS INDEX: 38.5 KG/M2 | TEMPERATURE: 98 F | HEART RATE: 111 BPM | SYSTOLIC BLOOD PRESSURE: 126 MMHG

## 2019-11-09 DIAGNOSIS — G89.29 CHRONIC PAIN OF RIGHT KNEE: ICD-10-CM

## 2019-11-09 DIAGNOSIS — M54.31 RIGHT SIDED SCIATICA: ICD-10-CM

## 2019-11-09 DIAGNOSIS — M25.551 RIGHT HIP PAIN: Primary | ICD-10-CM

## 2019-11-09 DIAGNOSIS — I82.442 ACUTE DEEP VEIN THROMBOSIS (DVT) OF TIBIAL VEIN OF LEFT LOWER EXTREMITY (HCC): ICD-10-CM

## 2019-11-09 DIAGNOSIS — M25.561 CHRONIC PAIN OF RIGHT KNEE: ICD-10-CM

## 2019-11-09 PROCEDURE — 99214 OFFICE O/P EST MOD 30 MIN: CPT | Performed by: FAMILY MEDICINE

## 2019-11-09 RX ORDER — PREDNISONE 20 MG/1
40 TABLET ORAL DAILY
Qty: 10 TABLET | Refills: 0 | Status: SHIPPED | OUTPATIENT
Start: 2019-11-09 | End: 2020-01-13 | Stop reason: ALTCHOICE

## 2019-11-09 NOTE — PATIENT INSTRUCTIONS
As of October 6th 2014, the Drug Enforcement Agency [facility]) is reclassifying all hydrocodone combination medications from Schedule III to Schedule II. This includes medications such as Norco, Vicodin, Lortab, Zohydro, and Vicoprofen.      What this means for buttock, hip, and leg. Sometimes the leg pain can happen without any back pain. Sciatica happens when a spinal nerve is irritated or has pressure put on it as comes out of the spinal canal in the lower back.  This most often happens when a bulge or rupture stress on your lower back than standing or walking. · Use heat from a hot shower, hot bath, or heating pad to help ease pain. Massage can also help. You can also try using an ice pack. You can make your own ice pack by putting ice cubes in a plastic bag. as much as possible until you can walk on it without pain. If you have a lot of pain with walking, crutches or a walker may be prescribed. These can be rented or purchased at [employer] and surgical or orthopedic supply stores.  Follow your healthcare healthcare professional's instructions.

## 2019-11-09 NOTE — PROGRESS NOTES
CHIEF COMPLAINT: Patient presents with:  Hip Pain: Right hip pain radiating to buttock    HPI:     Joseph Sykes is a 76year old female presents for followup right hip pain started 3 weeks ago insidiously.  Treated by  with prednisone x 5 d • Other and unspecified hyperlipidemia    • Prediabetes    • Shoulder pain, left    • Sleep apnea    • Thyroid condition    • Unspecified essential hypertension    • Unspecified sleep apnea 2008    uses Cpap      Past Surgical History:   Procedure Collins Matters 100 g 5   • Montelukast Sodium 10 MG Oral Tab Take 1 tablet (10 mg total) by mouth once daily.  90 tablet 3   • Omeprazole 40 MG Oral Capsule Delayed Release TAKE ONE CAPSULE BY MOUTH EVERY DAY AM/AFTER TAKIONG SYNTHROID 90 capsule 2   • Fluticasone Propion signs reviewed. Appears stated age, well groomed. Physical Exam         General: Well-nourished, well hydrated. No acute distress. No pallor. No tachypnea. Non toxic. HEENT: normocephaly, atraumatic. Sclera clear and non icteric bilaterally.   Oral pharyn acute fracture or dislocation is seen. If clinical symptoms persist recommend followup radiographs or MRI.       Dictated by: Ervin Sanabria MD on 10/25/2019 at 10:29       Approved by: Ervin Sanabria MD on 10/25/2019 at 10:29         REVIEWED THIS VISIT  ASSESSME Health Maintenance:  Annual Physical due on 01/11/2020  Mammogram due on 02/18/2020  Annual Depression Screen due on 03/06/2020  Fall Risk Screening due on 09/04/2020  Colonoscopy due on 04/07/2022  Influenza Vaccine Completed  DEXA Scan Completed  P

## 2019-11-15 RX ORDER — CYCLOBENZAPRINE HCL 10 MG
10 TABLET ORAL 3 TIMES DAILY PRN
Qty: 15 TABLET | Refills: 0 | Status: SHIPPED | OUTPATIENT
Start: 2019-11-15 | End: 2020-01-13 | Stop reason: ALTCHOICE

## 2019-11-15 NOTE — TELEPHONE ENCOUNTER
Carlyn Lang reports she only has 1 muscle relaxer left. She has apt with Dr Bobbi Ruiz 11/19/2019    Can she get a refill on muscle relaxer or any other option you have.  She is still having a lot of muscle spasm at this time      Medication pended if approved

## 2019-11-19 DIAGNOSIS — G43.709 CHRONIC MIGRAINE WITHOUT AURA WITHOUT STATUS MIGRAINOSUS, NOT INTRACTABLE: ICD-10-CM

## 2019-11-19 RX ORDER — DIVALPROEX SODIUM 500 MG/1
TABLET, EXTENDED RELEASE ORAL
Qty: 180 TABLET | Refills: 0 | Status: SHIPPED | OUTPATIENT
Start: 2019-11-19 | End: 2020-01-21

## 2019-11-19 NOTE — TELEPHONE ENCOUNTER
Medication: DIVALPROEX SODIUM  MG Oral Tablet 24 Hr    Date of last refill: 07/15/19 (#180/0)  [place]    Last office visit: 03/04/19  Due back to clinic per last office note:  Around 03/04/20  Date next office

## 2019-12-06 PROBLEM — M94.251 CHONDROMALACIA OF RIGHT HIP: Status: ACTIVE | Noted: 2019-12-06

## 2019-12-06 PROBLEM — M21.70 LEG LENGTH DISCREPANCY: Status: ACTIVE | Noted: 2019-12-06

## 2019-12-06 PROBLEM — M25.851 RIGHT HIP IMPINGEMENT SYNDROME: Status: ACTIVE | Noted: 2019-12-06

## 2019-12-27 ENCOUNTER — OFFICE VISIT (OUTPATIENT)
Dept: FAMILY MEDICINE CLINIC | Facility: CLINIC | Age: 69
End: 2019-12-27
Payer: MEDICARE

## 2019-12-27 VITALS
RESPIRATION RATE: 16 BRPM | TEMPERATURE: 98 F | SYSTOLIC BLOOD PRESSURE: 118 MMHG | HEART RATE: 76 BPM | DIASTOLIC BLOOD PRESSURE: 68 MMHG

## 2019-12-27 DIAGNOSIS — J01.40 ACUTE NON-RECURRENT PANSINUSITIS: Primary | ICD-10-CM

## 2019-12-27 PROCEDURE — 99213 OFFICE O/P EST LOW 20 MIN: CPT | Performed by: NURSE PRACTITIONER

## 2019-12-27 RX ORDER — AMOXICILLIN AND CLAVULANATE POTASSIUM 875; 125 MG/1; MG/1
1 TABLET, FILM COATED ORAL 2 TIMES DAILY
Qty: 20 TABLET | Refills: 0 | Status: SHIPPED | OUTPATIENT
Start: 2019-12-27 | End: 2020-01-06

## 2019-12-28 NOTE — PROGRESS NOTES
CHIEF COMPLAINT:   \" Patient presents with:  Sinus Problem    HPI:   Nilda Pierre is a 71year old female who presents with a hx of cold sx which progressed to Frontal and Maxillary sinus congestion and pressure.   Pt has been blowing out thick yello Oral Tab TAKE 1 TABLET BY MOUTH EVERY DAY AS NEEDED  1   • LISINOPRIL 10 MG Oral Tab TAKE 1 TABLET(10 MG) BY MOUTH DAILY 90 tablet 1   • ATORVASTATIN 40 MG Oral Tab TAKE 1 TABLET BY MOUTH EVERY NIGHT.  AVOID GRAPEFRUIT JUICE 90 tablet 1   • Sertraline HCl 1 the right knee only   • KNEE SURGERY  3/8/2015    • KNEE SURGERY  9/8/2015     3 knee surgeries, replacement and arthoscopy    • OTHER SURGICAL HISTORY  12/2012    right arm carpal tunnel      Family History   Problem Relation Age of Onset   • Hypertension 0  - Supportive measures discussed and listed in Patient Instructions    Advised patient to follow up with Dr. Sheridan Castillo if not improving with each day. Advised to go directly to the ED for any worsening of symptoms.     See Patient Instructions

## 2020-01-13 ENCOUNTER — OFFICE VISIT (OUTPATIENT)
Dept: FAMILY MEDICINE CLINIC | Facility: CLINIC | Age: 70
End: 2020-01-13
Payer: MEDICARE

## 2020-01-13 VITALS
TEMPERATURE: 98 F | HEIGHT: 65 IN | HEART RATE: 89 BPM | DIASTOLIC BLOOD PRESSURE: 64 MMHG | RESPIRATION RATE: 18 BRPM | BODY MASS INDEX: 37.35 KG/M2 | WEIGHT: 224.19 LBS | OXYGEN SATURATION: 99 % | SYSTOLIC BLOOD PRESSURE: 132 MMHG

## 2020-01-13 DIAGNOSIS — I10 BENIGN ESSENTIAL HTN: ICD-10-CM

## 2020-01-13 DIAGNOSIS — M25.551 RIGHT HIP PAIN: ICD-10-CM

## 2020-01-13 DIAGNOSIS — E66.01 OBESITY, MORBID, BMI 40.0-49.9 (HCC): ICD-10-CM

## 2020-01-13 DIAGNOSIS — G47.33 OSA ON CPAP: ICD-10-CM

## 2020-01-13 DIAGNOSIS — M17.12 PRIMARY OSTEOARTHRITIS OF LEFT KNEE: ICD-10-CM

## 2020-01-13 DIAGNOSIS — Z12.31 ENCOUNTER FOR SCREENING MAMMOGRAM FOR MALIGNANT NEOPLASM OF BREAST: ICD-10-CM

## 2020-01-13 DIAGNOSIS — G47.33 OBSTRUCTIVE SLEEP APNEA SYNDROME: ICD-10-CM

## 2020-01-13 DIAGNOSIS — E78.2 MIXED DYSLIPIDEMIA: ICD-10-CM

## 2020-01-13 DIAGNOSIS — Z99.89 OSA ON CPAP: ICD-10-CM

## 2020-01-13 DIAGNOSIS — I82.442 ACUTE DEEP VEIN THROMBOSIS (DVT) OF TIBIAL VEIN OF LEFT LOWER EXTREMITY (HCC): ICD-10-CM

## 2020-01-13 DIAGNOSIS — Z00.00 ENCOUNTER FOR ANNUAL HEALTH EXAMINATION: Primary | ICD-10-CM

## 2020-01-13 DIAGNOSIS — F33.41 RECURRENT MAJOR DEPRESSIVE DISORDER, IN PARTIAL REMISSION (HCC): ICD-10-CM

## 2020-01-13 DIAGNOSIS — R73.03 PREDIABETES: ICD-10-CM

## 2020-01-13 DIAGNOSIS — L30.9 FACIAL DERMATITIS: ICD-10-CM

## 2020-01-13 DIAGNOSIS — Z66 DNR (DO NOT RESUSCITATE): ICD-10-CM

## 2020-01-13 DIAGNOSIS — E03.9 ACQUIRED HYPOTHYROIDISM: ICD-10-CM

## 2020-01-13 PROCEDURE — G0439 PPPS, SUBSEQ VISIT: HCPCS | Performed by: FAMILY MEDICINE

## 2020-01-13 PROCEDURE — 99214 OFFICE O/P EST MOD 30 MIN: CPT | Performed by: FAMILY MEDICINE

## 2020-01-13 RX ORDER — DIAPER,BRIEF,INFANT-TODD,DISP
1 EACH MISCELLANEOUS 2 TIMES DAILY
Qty: 110 G | Refills: 0 | Status: SHIPPED | OUTPATIENT
Start: 2020-01-13 | End: 2021-11-23

## 2020-01-13 NOTE — PROGRESS NOTES
HPI:   Nu Calderon is a 71year old female who presents for a Medicare Subsequent Annual Wellness visit (Pt already had Initial Annual Wellness). Left lower extremity DVT-off Xarelto.   D-dimer is still elevated most likely due to other inflamma lb (99.8 kg)  12/06/19 : 220 lb (99.8 kg)  11/25/19 : 220 lb (99.8 kg)  11/09/19 : 220 lb (99.8 kg)    History of one fall in the past year when trying to help her grandchild who had crawled up on stool.   Patient had attempted to  stool and lost he 12/20/2016-WNL  Mammogram due 2/18/2020 - prior NL  Dexa: 2/18/2019 wnl taking vit D and calcium  Had varicella as a child  Neg Hep C testing 1/23/2019    Her last annual assessment has been over 1 year: Annual Physical due on 12/20/2017         Fall/Risk Family/surrogate (if present), and forms available to patient in AVS  Patient filled out forms today and a copy is sent to our scanning department (not required for CPT 21140 and 65771)   daughter, Stephanie Carter , assigned POA in event pt is incapcita from Last 3 Encounters:  01/13/20 : 224 lb 3.2 oz (101.7 kg)  01/08/20 : 229 lb 3.2 oz (104 kg)  12/06/19 : 220 lb (99.8 kg)     Last Cholesterol Labs:   Lab Results   Component Value Date    CHOLEST 109 01/23/2019    HDL 48 01/23/2019    LDL 43 01/23/2019 JUICE  Sertraline HCl 100 MG Oral Tab, TK 1 T PO  QPM  Spacer/Aero-Holding Chambers ([facility]) Does not apply Misc, Use as directed  ClonazePAM 0.5 MG Oral Tab, TK SS T PO BID UTD  Cranberry 125 MG Oral Tab, Take by mouth.   BuPROPion HCl ER, XL, denies blurred vision or double vision  HEENT: denies nasal congestion, sinus pain or ST  LUNGS: denies shortness of breath with exertion  CARDIOVASCULAR: denies chest pain on exertion  GI: denies abdominal pain, denies heartburn  : denies dysuria, vagin bruit or JVD   Back:   Symmetric, no curvature, ROM normal, no CVA tenderness   Lungs:   Clear to auscultation bilaterally, respirations unlabored   Heart:  Regular rate and rhythm, S1 and S2 normal, no murmur, rub, or gallop   Abdomen:   Soft, non-tender, CONDITIONS:   Wilberto Jackson is a 71year old female who presents for a Medicare Assessment.      PLAN SUMMARY:   Diagnoses and all orders for this visit:    Encounter for annual health examination   Prevention addressed     Screening for malignant neop Sees Dr. Priscila Arrieta. Last appointment was October 2019. Primary osteoarthritis of left knee  Continue diclofenac topical, continue as needed Tylenol arthritis 650 mg 1-2 tabs every 8 hours max 6 tablets or 4000 mg in 24 hours    Obesity, morbid, BMI 40. 01/23/2019 82   12/12/2006 93          Cardiovascular Disease Screening     LDL Annually LDL Cholesterol Calc (mg/dL)   Date Value   05/24/2006 87     LDL Cholesterol (mg/dL)   Date Value   01/23/2019 43        EKG - w/ Initial Preventative Physical Exam mentally retarded   Persons who live in the same house as a HepB virus carrier   Homosexual men   Illicit injectable drug abusers     Tetanus Toxoid  Only covered with a cut with metal- TD and TDaP Not covered by Medicare Part B No vaccine history found Th

## 2020-01-13 NOTE — PATIENT INSTRUCTIONS
As of October 6th 2014, the Drug Enforcement Agency [facility]) is reclassifying all hydrocodone combination medications from Schedule III to Schedule II. This includes medications such as Norco, Vicodin, Lortab, Zohydro, and Vicoprofen.      What this means for but may not be covered, or covered at this frequency, by your insurer. Please check with your insurance carrier before scheduling to verify coverage.    PREVENTATIVE SERVICES  INDICATIONS AND SCHEDULE Internal Lab or Procedure External Lab or Procedure   Desi Villa Covered up to Age 76     Colonoscopy Screen   Covered every 10 years- more often if abnormal Colonoscopy due on 04/07/2022 Update Health Maintenance if applicable    Flex Sigmoidoscopy Screen  Covered every 5 years No results found for this or any previous year    Pneumococcal 13 (Prevnar)  Covered Once after 65 No orders found for this or any previous visit.  Please get once after your 65th birthday    Pneumococcal 23 (Pneumovax)  Covered Once after 65 Orders placed or performed in visit on 12/20/16   • PNEU cholesterol, it can build up along the walls of the blood vessels. This makes the vessels narrower and decreases blood flow. You are then at greater risk of having a heart attack or a stroke.   Good and bad cholesterol  Lipids are fats, and blood is mostly comfortable. Increase your time and pace a little each week. · Work up to 30 to 40 minutes of moderate to high intensity physical activity at least 3 to 4 days per week. · Remember, some activity is better than none.   · If you haven't been exercising reg This is the pressure when the heart relaxes between beats.   Blood pressure is categorized as normal, elevated, or stage 1 or stage 2 high blood pressure:  · Normal blood pressure is systolic of less than 502 and diastolic of less than 80 (120/80)  · Elevat leaves, garden, or do household repairs. · Be active at a moderate to vigorous level of physical activity for at least 40 minutes for a minimum of 3 to 4 days a week.     Manage stress  · Make time to relax and enjoy life. Find time to laugh.   · Communica help strengthen your heart. If you have been diagnosed with a heart condition, your doctor may recommend exercise to help stabilize your condition. To help make exercise a habit, choose safe, fun activities.   Be sure to check with your healthcare provider arms  · Have unusual shortness of breath  · Have increased joint or muscle pain  · Have palpitations or an irregular heartbeat  Date Last Reviewed: 5/1/2016  © 9662-1335 [employer]. [address] [facility], [address] [place] [address].  All rights reser should last for 40 minutes on average. It is OK to work up to the 40 minute period over time. Examples of moderate-intensity activity is walking 1 mile in 15 minutes or 30 to 45 minutes of yard work. Step up your daily activity level.  Along with your exer throat, chronic cough, or sore throat, or hoarseness. GERD symptoms often occur during the day after a big meal. They can also occur at night when lying down.   Home care  Lifestyle changes can help reduce symptoms.  If needed, your healthcare provider may ibuprofen and aspirin. If you are taking aspirin for your heart or other medical reasons, talk to your healthcare provider about stopping it. Follow-up care  Follow up with your healthcare provider or as advised by our staff.   When to seek medical advice increases pressure on your stomach. It can make GERD worse. Watch your eating habits  Certain foods may increase the acid in your stomach or relax the lower esophageal sphincter. This makes GERD more likely.  It’s best to avoid the following if they cause

## 2020-01-15 PROBLEM — R51.9 SINUS HEADACHE: Status: RESOLVED | Noted: 2019-05-31 | Resolved: 2020-01-15

## 2020-01-15 PROBLEM — S39.012A STRAIN OF BACK: Status: RESOLVED | Noted: 2019-09-09 | Resolved: 2020-01-15

## 2020-01-15 PROBLEM — D72.810 LYMPHOCYTOPENIA: Status: RESOLVED | Noted: 2018-01-15 | Resolved: 2020-01-15

## 2020-01-19 DIAGNOSIS — G43.709 CHRONIC MIGRAINE WITHOUT AURA WITHOUT STATUS MIGRAINOSUS, NOT INTRACTABLE: ICD-10-CM

## 2020-01-21 RX ORDER — DIVALPROEX SODIUM 500 MG/1
TABLET, EXTENDED RELEASE ORAL
Qty: 60 TABLET | Refills: 2 | Status: SHIPPED | OUTPATIENT
Start: 2020-01-21 | End: 2020-04-15

## 2020-01-21 NOTE — TELEPHONE ENCOUNTER
Medication: DIVALPROEX SODIUM  MG Oral Tablet 24 Hr    Date of last refill: 11/19/19 (#180/0)  [place]    Last office visit: 03/04/19  Due back to clinic per last office note:  Around 03/04/2020  Date next [facility]

## 2020-01-31 ENCOUNTER — NURSE ONLY (OUTPATIENT)
Dept: FAMILY MEDICINE CLINIC | Facility: CLINIC | Age: 70
End: 2020-01-31
Payer: MEDICARE

## 2020-01-31 DIAGNOSIS — E03.9 ACQUIRED HYPOTHYROIDISM: ICD-10-CM

## 2020-01-31 DIAGNOSIS — E78.2 MIXED DYSLIPIDEMIA: ICD-10-CM

## 2020-01-31 DIAGNOSIS — I10 BENIGN ESSENTIAL HTN: ICD-10-CM

## 2020-01-31 DIAGNOSIS — R73.03 PREDIABETES: ICD-10-CM

## 2020-01-31 LAB
ALBUMIN SERPL-MCNC: 3.9 G/DL (ref 3.4–5)
ALBUMIN/GLOB SERPL: 1.3 {RATIO} (ref 1–2)
ALP LIVER SERPL-CCNC: 71 U/L (ref 55–142)
ALT SERPL-CCNC: 31 U/L (ref 13–56)
ANION GAP SERPL CALC-SCNC: 10 MMOL/L (ref 0–18)
AST SERPL-CCNC: 18 U/L (ref 15–37)
BILIRUB SERPL-MCNC: 0.3 MG/DL (ref 0.1–2)
BUN BLD-MCNC: 18 MG/DL (ref 7–18)
BUN/CREAT SERPL: 23.4 (ref 10–20)
CALCIUM BLD-MCNC: 9 MG/DL (ref 8.5–10.1)
CHLORIDE SERPL-SCNC: 96 MMOL/L (ref 98–112)
CHOLEST SMN-MCNC: 142 MG/DL (ref ?–200)
CO2 SERPL-SCNC: 28 MMOL/L (ref 21–32)
CREAT BLD-MCNC: 0.77 MG/DL (ref 0.55–1.02)
EST. AVERAGE GLUCOSE BLD GHB EST-MCNC: 126 MG/DL (ref 68–126)
GLOBULIN PLAS-MCNC: 3.1 G/DL (ref 2.8–4.4)
GLUCOSE BLD-MCNC: 82 MG/DL (ref 70–99)
HBA1C MFR BLD HPLC: 6 % (ref ?–5.7)
HDLC SERPL-MCNC: 54 MG/DL (ref 40–59)
LDLC SERPL CALC-MCNC: 66 MG/DL (ref ?–100)
M PROTEIN MFR SERPL ELPH: 7 G/DL (ref 6.4–8.2)
NONHDLC SERPL-MCNC: 88 MG/DL (ref ?–130)
OSMOLALITY SERPL CALC.SUM OF ELEC: 279 MOSM/KG (ref 275–295)
PATIENT FASTING Y/N/NP: YES
PATIENT FASTING Y/N/NP: YES
POTASSIUM SERPL-SCNC: 4.9 MMOL/L (ref 3.5–5.1)
SODIUM SERPL-SCNC: 134 MMOL/L (ref 136–145)
T4 FREE SERPL-MCNC: 1.1 NG/DL (ref 0.8–1.7)
TRIGL SERPL-MCNC: 110 MG/DL (ref 30–149)
TSI SER-ACNC: 1.78 MIU/ML (ref 0.36–3.74)
VLDLC SERPL CALC-MCNC: 22 MG/DL (ref 0–30)

## 2020-01-31 PROCEDURE — 80061 LIPID PANEL: CPT | Performed by: FAMILY MEDICINE

## 2020-01-31 PROCEDURE — 80053 COMPREHEN METABOLIC PANEL: CPT | Performed by: FAMILY MEDICINE

## 2020-01-31 PROCEDURE — 84439 ASSAY OF FREE THYROXINE: CPT | Performed by: FAMILY MEDICINE

## 2020-01-31 PROCEDURE — 84443 ASSAY THYROID STIM HORMONE: CPT | Performed by: FAMILY MEDICINE

## 2020-01-31 PROCEDURE — 83036 HEMOGLOBIN GLYCOSYLATED A1C: CPT | Performed by: FAMILY MEDICINE

## 2020-01-31 NOTE — PROGRESS NOTES
Patient came in for draw of ordered fasting labs. Patient drawn out of right AC, x 1 attempt and tolerated well. 1 lt grn  And 1 lav tube drawn.

## 2020-02-04 ENCOUNTER — TELEPHONE (OUTPATIENT)
Dept: FAMILY MEDICINE CLINIC | Facility: CLINIC | Age: 70
End: 2020-02-04

## 2020-02-04 NOTE — TELEPHONE ENCOUNTER
----- Message from Jim Shaffer DO sent at 1/31/2020  5:29 PM CST -----  Results reviewed. Released to [address] Will discuss with patient at next visit. Misha Gant,  I have reviewed your test results.   Your thyroid is controlled and your labs are stable

## 2020-02-04 NOTE — TELEPHONE ENCOUNTER
LMOM to return call to the office as this is my 2nd attempt to try to reach you. Provided pt office phone [phone] along with office hours given.

## 2020-02-05 ENCOUNTER — TELEPHONE (OUTPATIENT)
Dept: FAMILY MEDICINE CLINIC | Facility: CLINIC | Age: 70
End: 2020-02-05

## 2020-02-05 NOTE — TELEPHONE ENCOUNTER
Patient requesting parking placard form to be completed   Due 3/2020  Form complete, to PCP to appprove

## 2020-02-09 DIAGNOSIS — I10 BENIGN ESSENTIAL HTN: ICD-10-CM

## 2020-02-09 DIAGNOSIS — E78.2 MIXED DYSLIPIDEMIA: ICD-10-CM

## 2020-02-10 RX ORDER — LISINOPRIL 10 MG/1
TABLET ORAL
Qty: 90 TABLET | Refills: 1 | Status: SHIPPED | OUTPATIENT
Start: 2020-02-10 | End: 2020-08-28

## 2020-02-10 RX ORDER — ATORVASTATIN CALCIUM 40 MG/1
TABLET, FILM COATED ORAL
Qty: 90 TABLET | Refills: 1 | Status: SHIPPED | OUTPATIENT
Start: 2020-02-10 | End: 2020-08-24

## 2020-02-10 NOTE — TELEPHONE ENCOUNTER
Pt requesting refill of   Requested Prescriptions     Pending Prescriptions Disp Refills   • LISINOPRIL 10 MG Oral Tab [Pharmacy Med Name: LISINOPRIL 10 MG TABLET] 90 tablet 1     Sig: TAKE 1 TABLET(10 MG) BY MOUTH DAILY   • ATORVASTATIN 40 MG Oral Tab [

## 2020-02-19 ENCOUNTER — HOSPITAL ENCOUNTER (OUTPATIENT)
Dept: MAMMOGRAPHY | Age: 70
Discharge: HOME | End: 2020-02-19
Attending: FAMILY MEDICINE
Payer: MEDICARE

## 2020-02-19 DIAGNOSIS — Z12.31 ENCOUNTER FOR SCREENING MAMMOGRAM FOR MALIGNANT NEOPLASM OF BREAST: ICD-10-CM

## 2020-02-19 DIAGNOSIS — L30.9 FACIAL DERMATITIS: ICD-10-CM

## 2020-02-19 PROCEDURE — 77067 SCR MAMMO BI INCL CAD: CPT | Performed by: FAMILY MEDICINE

## 2020-02-19 PROCEDURE — 77063 BREAST TOMOSYNTHESIS BI: CPT | Performed by: FAMILY MEDICINE

## 2020-04-09 DIAGNOSIS — G43.709 CHRONIC MIGRAINE WITHOUT AURA WITHOUT STATUS MIGRAINOSUS, NOT INTRACTABLE: ICD-10-CM

## 2020-04-09 NOTE — TELEPHONE ENCOUNTER
Left VM that pt needs f/u.     Medication: DIVALPROEX SODIUM  MG     Date of last refill: 1/21/20 (#60/2)  [place]    Last office visit: 3/4/19  Due back to clinic per last office note:  1 year   Date next office

## 2020-04-14 NOTE — TELEPHONE ENCOUNTER
Pt returned call. Pt prefers phone consultation, which was scheduled for tomorrow morning (Wed 4/15) and 9am.  Pt does not need medication refilled before then. Instructed pt to discuss refill with provider.     Requested patient disable any blocked/unkno

## 2020-04-15 ENCOUNTER — VIRTUAL PHONE E/M (OUTPATIENT)
Dept: NEUROLOGY | Facility: CLINIC | Age: 70
End: 2020-04-15
Payer: MEDICARE

## 2020-04-15 DIAGNOSIS — G43.709 CHRONIC MIGRAINE WITHOUT AURA WITHOUT STATUS MIGRAINOSUS, NOT INTRACTABLE: Primary | ICD-10-CM

## 2020-04-15 PROCEDURE — 99441 PHONE E/M BY PHYS 5-10 MIN: CPT | Performed by: OTHER

## 2020-04-15 RX ORDER — DIVALPROEX SODIUM 500 MG/1
TABLET, EXTENDED RELEASE ORAL
Qty: 60 TABLET | Refills: 11 | Status: SHIPPED | OUTPATIENT
Start: 2020-04-15 | End: 2021-04-19

## 2020-04-15 NOTE — PROGRESS NOTES
Called patient, no answer, left message on VM, will try again later. Virtual Telephone Check-In    [place] verbally consents to a Virtual/Telephone Check-In visit on 04/15/20.     Patient understands and accepts financial responsibility fo

## 2020-04-15 NOTE — TELEPHONE ENCOUNTER
Per telephone consult today, 4/15:    A/P  1.  Chronic migraine without aura without status migrainosus, not intractable  New additional trigger of eye strain  Taking Excedrin 3 times a week for both mild headaches and knee pain, less likely to develop rebo

## 2020-04-22 ENCOUNTER — TELEPHONE (OUTPATIENT)
Dept: FAMILY MEDICINE CLINIC | Facility: CLINIC | Age: 70
End: 2020-04-22

## 2020-04-22 DIAGNOSIS — J31.0 NONALLERGIC RHINITIS: ICD-10-CM

## 2020-04-22 RX ORDER — FLUTICASONE PROPIONATE 50 MCG
SPRAY, SUSPENSION (ML) NASAL
Qty: 48 G | Refills: 0 | Status: SHIPPED | OUTPATIENT
Start: 2020-04-22 | End: 2020-07-15

## 2020-04-22 NOTE — TELEPHONE ENCOUNTER
Pt requesting refill of   Requested Prescriptions     Signed Prescriptions Disp Refills   • Fluticasone Propionate 50 MCG/ACT Nasal Suspension 48 g 0     Sig: SPRAY 2 SPRAYS INTO EACH NOSTRIL EVERY DAY     Authorizing Provider: Brandi Diaz

## 2020-06-09 ENCOUNTER — TELEPHONE (OUTPATIENT)
Dept: FAMILY MEDICINE CLINIC | Facility: CLINIC | Age: 70
End: 2020-06-09

## 2020-06-09 NOTE — TELEPHONE ENCOUNTER
LMOM to return call to the office. Provided pt office phone [phone] along with office hours given.

## 2020-06-09 NOTE — TELEPHONE ENCOUNTER
Pt called office stating she is having diarrhea and vomiting, but no fever. Pt wants to know what to do.

## 2020-06-09 NOTE — TELEPHONE ENCOUNTER
Patient reports   Saturday she started with lots of diarrhea until 0300 Sunday. Took antidiarrhea medication    Yesterday threw up after eating breakfast a few times. Today she feels like she has gas pains then diarrhea resumed.  She took antidiarrheal me

## 2020-06-10 NOTE — TELEPHONE ENCOUNTER
Patient needs virtual visit tomorrow-if dizzy, lightheaded no void in 8 hours, localized abdominal pain or worse symptoms then she needs to go to the emergency room for evaluation. q

## 2020-06-10 NOTE — TELEPHONE ENCOUNTER
Reports she is doing better today. Drinking electrolytes, eating rice etc.  Went to bed last night at 6:30 pm and woke up at 1000am    Patient reports she is feeling much today and will call back if it changes  Patient verbalizes understanding.  To go to t

## 2020-07-14 DIAGNOSIS — J31.0 NONALLERGIC RHINITIS: ICD-10-CM

## 2020-07-15 RX ORDER — FLUTICASONE PROPIONATE 50 MCG
SPRAY, SUSPENSION (ML) NASAL
Qty: 1 BOTTLE | Refills: 0 | Status: SHIPPED | OUTPATIENT
Start: 2020-07-15 | End: 2020-08-10

## 2020-07-15 NOTE — TELEPHONE ENCOUNTER
Pt requesting refill of   Requested Prescriptions     Pending Prescriptions Disp Refills   • FLUTICASONE PROPIONATE 50 MCG/ACT Nasal Suspension [Pharmacy Med Name: FLUTICASONE PROP 50 MCG SPRAY] 1 Bottle 0     Sig: SPRAY 2 SPRAYS INTO EACH NOSTRIL EVERY

## 2020-07-17 ENCOUNTER — VIRTUAL PHONE E/M (OUTPATIENT)
Dept: FAMILY MEDICINE CLINIC | Facility: CLINIC | Age: 70
End: 2020-07-17
Payer: MEDICARE

## 2020-07-17 ENCOUNTER — TELEPHONE (OUTPATIENT)
Dept: FAMILY MEDICINE CLINIC | Facility: CLINIC | Age: 70
End: 2020-07-17

## 2020-07-17 DIAGNOSIS — R68.89 FLU-LIKE SYMPTOMS: Primary | ICD-10-CM

## 2020-07-17 DIAGNOSIS — J01.01 ACUTE RECURRENT MAXILLARY SINUSITIS: ICD-10-CM

## 2020-07-17 PROCEDURE — 99441 PHONE E/M BY PHYS 5-10 MIN: CPT | Performed by: FAMILY MEDICINE

## 2020-07-17 RX ORDER — AMOXICILLIN AND CLAVULANATE POTASSIUM 875; 125 MG/1; MG/1
1 TABLET, FILM COATED ORAL 2 TIMES DAILY
Qty: 14 TABLET | Refills: 0 | Status: SHIPPED | OUTPATIENT
Start: 2020-07-17 | End: 2020-07-24

## 2020-07-17 NOTE — TELEPHONE ENCOUNTER
Patient calling with splitting headache, sore throat, upset stomach since Monday. States she has been falling asleep very early, around 7pm, except last night she was tossing and turning all night.      Have you been in contact with someone who was confirme

## 2020-07-17 NOTE — PATIENT INSTRUCTIONS
Coronavirus Disease 2019 (COVID-19): Prevention  The best prevention is to have no contact with the SARS-CoV-2 virus. There is no vaccine yet. Canceling travel and other outings  Stay informed about COVID-19 in your area.  Follow local instructions about · Clean frequently-touched home surfaces often with disinfectant. This includes desk surfaces, printers, phones, kitchen counters, tables, fridge door handle, bathroom surfaces, and any soiled surface. Closely follow disinfectant label instructions.  Go to · If you feel sick in any way, go home and stay home. · Tell your supervisor if you are well but live with someone who has COVID-19. · Stay at least 6 feet away from all people. · Don't shake hands with anyone.   · Don't attend in-person meetings, or ozuna If you have been exposed to a person with COVID-19  If you've been exposed within that last 14 days to someone who is suspected of having COVID-19 or has tested positive for it:  · Call your healthcare provider and follow all instructions.  Your activities

## 2020-07-17 NOTE — PROGRESS NOTES
Virtual Telephone Check-In    Ember Dates Defilippis verbally consents to a Virtual/Telephone Check-In visit on 07/17/20. Patient has been referred to the [facility] website at [email or web address] to review the yearly Consent to Treat document.     Patient delmy symptoms, I advised her to get tested for Covid. She agrees to this.   I will also send in Rx for Augmentin for her sinusitis that she can start empirically, and will d/c if she has a positive Covid test.  Offered testing at THE [facility] facility, but pt states C

## 2020-07-27 ENCOUNTER — TELEPHONE (OUTPATIENT)
Dept: FAMILY MEDICINE CLINIC | Facility: CLINIC | Age: 70
End: 2020-07-27

## 2020-07-27 NOTE — TELEPHONE ENCOUNTER
Pt called stating the it's been 10 days since had COVID testing done at [facility] and hoping she'll get the test results today. Pt is asking if COVID testing is negative can she go back to work tomorrow?      Informed pt since because she is suspicious for Covid,

## 2020-07-28 NOTE — TELEPHONE ENCOUNTER
No fever or covid symptoms may return to work for 24 hours and if at least 10 days from onset per new cdc guideline. Per documentation 7/17/20- symptoms started 7/12/20. Pt may have letter to return to work.

## 2020-07-28 NOTE — TELEPHONE ENCOUNTER
Patient reports she no longer has a splitting HA. The HA has been gone since 7/20/2020  Cough and scratchy throat are typical allergy symptoms  New CDC guidelines \"CDC Guideline Updates:  As of July 20, 2020 the CDC states: 1.  A test based strategy is no

## 2020-07-29 NOTE — TELEPHONE ENCOUNTER
Left detailed message of information below for patient. Advised to call us back if changes in symptoms, with positive results or if letter is needed for work.

## 2020-08-04 DIAGNOSIS — I10 BENIGN ESSENTIAL HTN: ICD-10-CM

## 2020-08-04 DIAGNOSIS — E78.2 MIXED DYSLIPIDEMIA: ICD-10-CM

## 2020-08-04 NOTE — TELEPHONE ENCOUNTER
LMOM to return call to the office. Provided pt office phone [phone] along with office hours given.      Patient has overdue fasting lab work and overdue for follow HTN , high Chol and DM apt

## 2020-08-06 RX ORDER — LISINOPRIL 10 MG/1
TABLET ORAL
Qty: 90 TABLET | Refills: 1 | OUTPATIENT
Start: 2020-08-06

## 2020-08-06 RX ORDER — ATORVASTATIN CALCIUM 40 MG/1
TABLET, FILM COATED ORAL
Qty: 90 TABLET | Refills: 1 | OUTPATIENT
Start: 2020-08-06

## 2020-08-06 NOTE — TELEPHONE ENCOUNTER
Pt requesting refill of   Requested Prescriptions     Refused Prescriptions Disp Refills   • ATORVASTATIN 40 MG Oral Tab [Pharmacy Med Name: ATORVASTATIN 40 MG TABLET] 90 tablet 1     Sig: TAKE 1 TABLET BY MOUTH EVERY NIGHT.  AVOID GRAPEFRUIT JUICE     Refu

## 2020-08-10 DIAGNOSIS — J31.0 NONALLERGIC RHINITIS: ICD-10-CM

## 2020-08-10 RX ORDER — FLUTICASONE PROPIONATE 50 MCG
SPRAY, SUSPENSION (ML) NASAL
Qty: 3 BOTTLE | Refills: 1 | Status: SHIPPED | OUTPATIENT
Start: 2020-08-10 | End: 2021-02-25

## 2020-08-10 NOTE — TELEPHONE ENCOUNTER
Pt requesting refill of   Requested Prescriptions     Pending Prescriptions Disp Refills   • FLUTICASONE PROPIONATE 50 MCG/ACT Nasal Suspension [Pharmacy Med Name: FLUTICASONE PROP 50 MCG SPRAY] 3 Bottle 0     Sig: SPRAY 2 SPRAYS INTO EACH NOSTRIL EVERY

## 2020-08-21 DIAGNOSIS — E78.2 MIXED DYSLIPIDEMIA: ICD-10-CM

## 2020-08-24 ENCOUNTER — LAB ENCOUNTER (OUTPATIENT)
Dept: LAB | Age: 70
End: 2020-08-24
Attending: FAMILY MEDICINE
Payer: MEDICARE

## 2020-08-24 DIAGNOSIS — R73.03 PREDIABETES: ICD-10-CM

## 2020-08-24 DIAGNOSIS — E03.9 ACQUIRED HYPOTHYROIDISM: ICD-10-CM

## 2020-08-24 DIAGNOSIS — I10 BENIGN ESSENTIAL HTN: ICD-10-CM

## 2020-08-24 DIAGNOSIS — E78.2 MIXED DYSLIPIDEMIA: ICD-10-CM

## 2020-08-24 DIAGNOSIS — E66.01 OBESITY, MORBID, BMI 40.0-49.9 (HCC): ICD-10-CM

## 2020-08-24 DIAGNOSIS — G43.709 CHRONIC MIGRAINE WITHOUT AURA WITHOUT STATUS MIGRAINOSUS, NOT INTRACTABLE: ICD-10-CM

## 2020-08-24 LAB
ALBUMIN SERPL-MCNC: 3.7 G/DL (ref 3.4–5)
ALBUMIN/GLOB SERPL: 1.1 {RATIO} (ref 1–2)
ALP LIVER SERPL-CCNC: 71 U/L (ref 55–142)
ALT SERPL-CCNC: 30 U/L (ref 13–56)
ANION GAP SERPL CALC-SCNC: 8 MMOL/L (ref 0–18)
AST SERPL-CCNC: 16 U/L (ref 15–37)
BASOPHILS # BLD AUTO: 0.04 X10(3) UL (ref 0–0.2)
BASOPHILS NFR BLD AUTO: 0.6 %
BILIRUB SERPL-MCNC: 0.4 MG/DL (ref 0.1–2)
BUN BLD-MCNC: 10 MG/DL (ref 7–18)
BUN/CREAT SERPL: 11.6 (ref 10–20)
CALCIUM BLD-MCNC: 9.5 MG/DL (ref 8.5–10.1)
CHLORIDE SERPL-SCNC: 98 MMOL/L (ref 98–112)
CHOLEST SMN-MCNC: 156 MG/DL (ref ?–200)
CO2 SERPL-SCNC: 25 MMOL/L (ref 21–32)
CREAT BLD-MCNC: 0.86 MG/DL (ref 0.55–1.02)
DEPRECATED RDW RBC AUTO: 43.4 FL (ref 35.1–46.3)
EOSINOPHIL # BLD AUTO: 0.12 X10(3) UL (ref 0–0.7)
EOSINOPHIL NFR BLD AUTO: 1.7 %
ERYTHROCYTE [DISTWIDTH] IN BLOOD BY AUTOMATED COUNT: 13.6 % (ref 11–15)
EST. AVERAGE GLUCOSE BLD GHB EST-MCNC: 134 MG/DL (ref 68–126)
GLOBULIN PLAS-MCNC: 3.3 G/DL (ref 2.8–4.4)
GLUCOSE BLD-MCNC: 94 MG/DL (ref 70–99)
HBA1C MFR BLD HPLC: 6.3 % (ref ?–5.7)
HCT VFR BLD AUTO: 41.8 % (ref 35–48)
HDLC SERPL-MCNC: 66 MG/DL (ref 40–59)
HGB BLD-MCNC: 13.2 G/DL (ref 12–16)
IMM GRANULOCYTES # BLD AUTO: 0.04 X10(3) UL (ref 0–1)
IMM GRANULOCYTES NFR BLD: 0.6 %
INSULIN SERPL-ACNC: 14.4 MU/L (ref 3–25)
LDLC SERPL CALC-MCNC: 64 MG/DL (ref ?–100)
LYMPHOCYTES # BLD AUTO: 2.19 X10(3) UL (ref 1–4)
LYMPHOCYTES NFR BLD AUTO: 30.6 %
M PROTEIN MFR SERPL ELPH: 7 G/DL (ref 6.4–8.2)
MCH RBC QN AUTO: 27.6 PG (ref 26–34)
MCHC RBC AUTO-ENTMCNC: 31.6 G/DL (ref 31–37)
MCV RBC AUTO: 87.3 FL (ref 80–100)
MONOCYTES # BLD AUTO: 0.54 X10(3) UL (ref 0.1–1)
MONOCYTES NFR BLD AUTO: 7.5 %
NEUTROPHILS # BLD AUTO: 4.23 X10 (3) UL (ref 1.5–7.7)
NEUTROPHILS # BLD AUTO: 4.23 X10(3) UL (ref 1.5–7.7)
NEUTROPHILS NFR BLD AUTO: 59 %
NONHDLC SERPL-MCNC: 90 MG/DL (ref ?–130)
OSMOLALITY SERPL CALC.SUM OF ELEC: 271 MOSM/KG (ref 275–295)
PATIENT FASTING Y/N/NP: YES
PATIENT FASTING Y/N/NP: YES
PLATELET # BLD AUTO: 282 10(3)UL (ref 150–450)
POTASSIUM SERPL-SCNC: 4.6 MMOL/L (ref 3.5–5.1)
RBC # BLD AUTO: 4.79 X10(6)UL (ref 3.8–5.3)
SODIUM SERPL-SCNC: 131 MMOL/L (ref 136–145)
T3FREE SERPL-MCNC: 1.9 PG/ML (ref 2.4–4.2)
TRIGL SERPL-MCNC: 131 MG/DL (ref 30–149)
TSI SER-ACNC: 1.73 MIU/ML (ref 0.36–3.74)
VLDLC SERPL CALC-MCNC: 26 MG/DL (ref 0–30)
WBC # BLD AUTO: 7.2 X10(3) UL (ref 4–11)

## 2020-08-24 PROCEDURE — 84443 ASSAY THYROID STIM HORMONE: CPT

## 2020-08-24 PROCEDURE — 80053 COMPREHEN METABOLIC PANEL: CPT

## 2020-08-24 PROCEDURE — 36415 COLL VENOUS BLD VENIPUNCTURE: CPT

## 2020-08-24 PROCEDURE — 83036 HEMOGLOBIN GLYCOSYLATED A1C: CPT

## 2020-08-24 PROCEDURE — 83525 ASSAY OF INSULIN: CPT

## 2020-08-24 PROCEDURE — 84481 FREE ASSAY (FT-3): CPT

## 2020-08-24 PROCEDURE — 82397 CHEMILUMINESCENT ASSAY: CPT

## 2020-08-24 PROCEDURE — 85025 COMPLETE CBC W/AUTO DIFF WBC: CPT

## 2020-08-24 PROCEDURE — 80061 LIPID PANEL: CPT

## 2020-08-24 RX ORDER — ATORVASTATIN CALCIUM 40 MG/1
TABLET, FILM COATED ORAL
Qty: 90 TABLET | Refills: 0 | Status: SHIPPED | OUTPATIENT
Start: 2020-08-24 | End: 2020-08-26

## 2020-08-24 NOTE — TELEPHONE ENCOUNTER
Pt requesting refill of   Requested Prescriptions     Pending Prescriptions Disp Refills   • ATORVASTATIN 40 MG Oral Tab [Pharmacy Med Name: ATORVASTATIN 40 MG TABLET] 90 tablet 1     Sig: TAKE 1 TABLET BY MOUTH EVERY NIGHT.  AVOID GRAPEFRUIT JUICE     Pa

## 2020-08-26 ENCOUNTER — OFFICE VISIT (OUTPATIENT)
Dept: FAMILY MEDICINE CLINIC | Facility: CLINIC | Age: 70
End: 2020-08-26
Payer: MEDICARE

## 2020-08-26 VITALS
BODY MASS INDEX: 48.12 KG/M2 | RESPIRATION RATE: 18 BRPM | OXYGEN SATURATION: 99 % | DIASTOLIC BLOOD PRESSURE: 72 MMHG | WEIGHT: 288.81 LBS | TEMPERATURE: 97 F | HEART RATE: 122 BPM | HEIGHT: 65 IN | SYSTOLIC BLOOD PRESSURE: 120 MMHG

## 2020-08-26 DIAGNOSIS — I10 BENIGN ESSENTIAL HTN: ICD-10-CM

## 2020-08-26 DIAGNOSIS — E03.9 ACQUIRED HYPOTHYROIDISM: ICD-10-CM

## 2020-08-26 DIAGNOSIS — E78.2 MIXED DYSLIPIDEMIA: ICD-10-CM

## 2020-08-26 DIAGNOSIS — K52.9 ACUTE GASTROENTERITIS: Primary | ICD-10-CM

## 2020-08-26 DIAGNOSIS — R73.03 PREDIABETES: ICD-10-CM

## 2020-08-26 PROCEDURE — 99214 OFFICE O/P EST MOD 30 MIN: CPT | Performed by: FAMILY MEDICINE

## 2020-08-26 RX ORDER — AMOXICILLIN 500 MG/1
CAPSULE ORAL
COMMUNITY
Start: 2020-02-29 | End: 2020-08-26 | Stop reason: ALTCHOICE

## 2020-08-26 RX ORDER — ONDANSETRON 4 MG/1
4 TABLET, ORALLY DISINTEGRATING ORAL EVERY 8 HOURS PRN
Qty: 10 TABLET | Refills: 0 | Status: SHIPPED | OUTPATIENT
Start: 2020-08-26 | End: 2021-03-16 | Stop reason: ALTCHOICE

## 2020-08-26 RX ORDER — ATORVASTATIN CALCIUM 40 MG/1
40 TABLET, FILM COATED ORAL NIGHTLY
Qty: 90 TABLET | Refills: 1 | Status: SHIPPED | OUTPATIENT
Start: 2020-08-26 | End: 2021-05-03

## 2020-08-27 LAB — LEPTIN: 52.6 NG/ML

## 2020-08-27 NOTE — PROGRESS NOTES
CHIEF COMPLAINT: No chief complaint on file. HPI:     Niesha Starr is a 71year old female presents for nausea and diarrhea and lab results f-u. Nausea and diarrhea: Pt states that she has had nausea after her blood draw 2 days ago.   Her st • Shoulder pain, left    • Sleep apnea    • Thyroid condition    • Unspecified essential hypertension    • Unspecified sleep apnea 2008    uses Cpap      Past Surgical History:   Procedure Laterality Date   • [address] [facility] total 450mg daily dose 30 tablet 3   • DIVALPROEX SODIUM  MG Oral Tablet 24 Hr TAKE 1 TABLET BY MOUTH TWICE A DAY 60 tablet 11   • LISINOPRIL 10 MG Oral Tab TAKE 1 TABLET(10 MG) BY MOUTH DAILY 90 tablet 1   • Aspirin-Acetaminophen-Caffeine (EXCEDRIN childhood    PSFH elements reviewed from today and agreed except as otherwise stated in HPI.  ROS:     Review of Systems   Constitutional: Positive for appetite change. Negative for chills, fatigue, fever and unexpected weight change.    HENT: Negative for LABS     [facility] Lab Encounter on 08/24/2020   Component Date Value   • Insulin 08/24/2020 14.4    • TSH 08/24/2020 1.730    • T3 Free 08/24/2020 1.90*   • Glucose 08/24/2020 94    • Sodium 08/24/2020 131*   • Potassium 08/24/2020 4.6    • Chloride 08/24 year old female with    1. Acute gastroenteritis  - likely is a viral gastroenteritis, reassured pt this will likely resolve on its own in the next 3-5 days.    - start Zofran Q8 prn, R/B/SEs of new med d/w pt  - follow a bland diet for now  - if sx worsen Screening due on 01/13/2021  Annual Depression Screen due on 01/13/2021  Annual Physical due on 01/13/2021  Mammogram due on 02/19/2021  Colonoscopy due on 04/07/2022  DEXA Scan Completed  Pneumococcal PPSV23/PCV13 65+ Years / Low and Medium Risk Completed

## 2020-08-28 ENCOUNTER — TELEPHONE (OUTPATIENT)
Dept: FAMILY MEDICINE CLINIC | Facility: CLINIC | Age: 70
End: 2020-08-28

## 2020-08-28 DIAGNOSIS — I10 BENIGN ESSENTIAL HTN: ICD-10-CM

## 2020-08-28 RX ORDER — LISINOPRIL 10 MG/1
TABLET ORAL
Qty: 90 TABLET | Refills: 1 | Status: SHIPPED | OUTPATIENT
Start: 2020-08-28 | End: 2021-02-26

## 2020-08-28 NOTE — TELEPHONE ENCOUNTER
Pt requesting refill of   Requested Prescriptions     Pending Prescriptions Disp Refills   • lisinopril 10 MG Oral Tab [Pharmacy Med Name: LISINOPRIL 10 MG TABLET] 90 tablet 1     Sig: TAKE 1 TABLET BY MOUTH EVERY DAY     Passed protocol, refill approved,

## 2020-08-28 NOTE — TELEPHONE ENCOUNTER
Pt called office asking if she should get her Flu shot now at a store that gives them. Or should she wait until they become available here.

## 2020-08-31 ENCOUNTER — VIRTUAL PHONE E/M (OUTPATIENT)
Dept: FAMILY MEDICINE CLINIC | Facility: CLINIC | Age: 70
End: 2020-08-31
Payer: MEDICARE

## 2020-08-31 DIAGNOSIS — R68.89 FLU-LIKE SYMPTOMS: Primary | ICD-10-CM

## 2020-08-31 DIAGNOSIS — J01.00 ACUTE MAXILLARY SINUSITIS, RECURRENCE NOT SPECIFIED: ICD-10-CM

## 2020-08-31 PROCEDURE — 99441 PHONE E/M BY PHYS 5-10 MIN: CPT | Performed by: FAMILY MEDICINE

## 2020-08-31 RX ORDER — AMOXICILLIN AND CLAVULANATE POTASSIUM 875; 125 MG/1; MG/1
1 TABLET, FILM COATED ORAL 2 TIMES DAILY
Qty: 14 TABLET | Refills: 0 | Status: SHIPPED | OUTPATIENT
Start: 2020-08-31 | End: 2020-09-07

## 2020-08-31 NOTE — PATIENT INSTRUCTIONS
Coronavirus Disease 2019 (COVID-19): Caring for Yourself or Others  If you or a household member have symptoms of COVID-19, follow the guidelines below for preventing spread of the virus, and managing symptoms.   If you think you have COVID-19 symptoms  · · Follow all instructions the healthcare staff give you. If you have been diagnosed with COVID-19  · Stay home and start self-isolation. Don’t leave your home unless you need to get medical care. Don't go to work, school, or public areas.  Don't use publ Current treatment is mainly aimed at helping your body while it fights the virus. This is known as supportive care. Take care of yourself at home by:  · Getting rest. This helps your body fight the illness. · Staying hydrated.  Drinking liquids is the best · Clean home surfaces often with disinfectant. This includes phones, kitchen counters, fridge door handle, bathroom surfaces, and others. · Don’t let anyone share household items with the sick person.  This includes eating and drinking tools, towels, sheet 3. You have 2 negative COVID-19 nose-throat swabs that were collected at least 24 hours apart. If no tests are available, your healthcare provider will likely tell you to follow the isolation instructions for normally healthy people.  Follow your provider's

## 2020-09-01 ENCOUNTER — LAB ENCOUNTER (OUTPATIENT)
Dept: LAB | Age: 70
End: 2020-09-01
Attending: FAMILY MEDICINE
Payer: MEDICARE

## 2020-09-01 ENCOUNTER — TELEPHONE (OUTPATIENT)
Dept: FAMILY MEDICINE CLINIC | Facility: CLINIC | Age: 70
End: 2020-09-01

## 2020-09-01 DIAGNOSIS — R68.89 FLU-LIKE SYMPTOMS: ICD-10-CM

## 2020-09-02 DIAGNOSIS — Z87.19 HISTORY OF GASTRITIS: ICD-10-CM

## 2020-09-02 RX ORDER — OMEPRAZOLE 40 MG/1
CAPSULE, DELAYED RELEASE ORAL
Qty: 90 CAPSULE | Refills: 2 | Status: SHIPPED | OUTPATIENT
Start: 2020-09-02 | End: 2021-06-02

## 2020-09-02 NOTE — TELEPHONE ENCOUNTER
Pt requesting refill of   Requested Prescriptions     Pending Prescriptions Disp Refills   • Omeprazole 40 MG Oral Capsule Delayed Release [Pharmacy Med Name: OMEPRAZOLE DR 40 MG CAPSULE] 90 capsule 2     Sig: TAKE ONE CAPSULE BY MOUTH EVERY DAY AM/AFTER T

## 2020-09-03 LAB — SARS-COV-2 RNA RESP QL NAA+PROBE: NOT DETECTED

## 2020-09-04 ENCOUNTER — TELEPHONE (OUTPATIENT)
Dept: FAMILY MEDICINE CLINIC | Facility: CLINIC | Age: 70
End: 2020-09-04

## 2020-09-04 NOTE — TELEPHONE ENCOUNTER
Agree with advice given to pt. Have her complete the antibiotic regimen and if still having symptoms next week, she will need an appt to be re-evaluated before antibiotic is recommended.   Please stress this may very well be viral and need to runs it's cour

## 2020-09-04 NOTE — TELEPHONE ENCOUNTER
Patient calling. She is still having sinus symptoms, no improvement. She is on day 5 of antibiotic. Using Flonase daily and using steam without much relief.    She is requesting refill of antibiotic, but advised that if symptoms are viral, it would not be

## 2020-09-04 NOTE — TELEPHONE ENCOUNTER
Patient notified of message below. Patient verbalized understanding. She states she continues to have nausea. Last episode of diarrhea was Wednesday. Patient eating primarily saltine crackers and toast. Feels nauseated very easily.  She is taking in pl

## 2020-09-24 ENCOUNTER — IMMUNIZATION (OUTPATIENT)
Dept: FAMILY MEDICINE CLINIC | Facility: CLINIC | Age: 70
End: 2020-09-24
Payer: MEDICARE

## 2020-09-24 DIAGNOSIS — Z23 NEED FOR VACCINATION: ICD-10-CM

## 2020-09-24 PROCEDURE — 90662 IIV NO PRSV INCREASED AG IM: CPT | Performed by: FAMILY MEDICINE

## 2020-09-24 PROCEDURE — G0008 ADMIN INFLUENZA VIRUS VAC: HCPCS | Performed by: FAMILY MEDICINE

## 2020-10-12 RX ORDER — MONTELUKAST SODIUM 10 MG/1
TABLET ORAL
Qty: 90 TABLET | Refills: 0 | Status: SHIPPED | OUTPATIENT
Start: 2020-10-12 | End: 2021-01-07

## 2020-10-15 NOTE — TELEPHONE ENCOUNTER
Received mail from [employer] that ondansetron is not covered under plan  Med ordered 8/26/202      Spoke with patient and she already picked it up and will not need refills 09/2020

## 2021-01-07 RX ORDER — MONTELUKAST SODIUM 10 MG/1
TABLET ORAL
Qty: 90 TABLET | Refills: 0 | Status: SHIPPED | OUTPATIENT
Start: 2021-01-07 | End: 2021-03-16

## 2021-01-07 NOTE — TELEPHONE ENCOUNTER
Pt requesting refill of   Requested Prescriptions     Pending Prescriptions Disp Refills   • MONTELUKAST SODIUM 10 MG Oral Tab [Pharmacy Med Name: MONTELUKAST SOD 10 MG TABLET] 90 tablet 0     Sig: TAKE 1 TABLET BY MOUTH EVERY DAY     Passed protocol, re

## 2021-02-12 ENCOUNTER — PATIENT OUTREACH (OUTPATIENT)
Dept: FAMILY MEDICINE CLINIC | Facility: CLINIC | Age: 71
End: 2021-02-12

## 2021-02-25 DIAGNOSIS — J31.0 NONALLERGIC RHINITIS: ICD-10-CM

## 2021-02-25 DIAGNOSIS — I10 BENIGN ESSENTIAL HTN: ICD-10-CM

## 2021-02-25 RX ORDER — FLUTICASONE PROPIONATE 50 MCG
SPRAY, SUSPENSION (ML) NASAL
Qty: 3 BOTTLE | Refills: 1 | Status: SHIPPED | OUTPATIENT
Start: 2021-02-25 | End: 2021-08-19

## 2021-02-25 NOTE — TELEPHONE ENCOUNTER
Pt requesting refill of   Requested Prescriptions     Pending Prescriptions Disp Refills   • FLUTICASONE PROPIONATE 50 MCG/ACT Nasal Suspension [Pharmacy Med Name: FLUTICASONE PROP 50 MCG SPRAY]  1     Sig: SPRAY 2 SPRAYS INTO EACH NOSTRIL EVERY DAY   •

## 2021-02-26 ENCOUNTER — TELEPHONE (OUTPATIENT)
Dept: FAMILY MEDICINE CLINIC | Facility: CLINIC | Age: 71
End: 2021-02-26

## 2021-02-26 RX ORDER — LISINOPRIL 10 MG/1
TABLET ORAL
Qty: 90 TABLET | Refills: 0 | Status: SHIPPED | OUTPATIENT
Start: 2021-02-26 | End: 2021-03-16

## 2021-02-26 NOTE — TELEPHONE ENCOUNTER
Patient would like a refill on her Lisinopril, she has an appointment on March 5 but will run out of medication by then.

## 2021-03-09 DIAGNOSIS — Z23 NEED FOR VACCINATION: ICD-10-CM

## 2021-03-15 PROBLEM — F41.1 GENERALIZED ANXIETY DISORDER: Status: ACTIVE | Noted: 2021-03-15

## 2021-03-16 ENCOUNTER — OFFICE VISIT (OUTPATIENT)
Dept: FAMILY MEDICINE CLINIC | Facility: CLINIC | Age: 71
End: 2021-03-16
Payer: MEDICARE

## 2021-03-16 VITALS
TEMPERATURE: 98 F | HEIGHT: 65 IN | WEIGHT: 258.19 LBS | DIASTOLIC BLOOD PRESSURE: 74 MMHG | OXYGEN SATURATION: 97 % | HEART RATE: 105 BPM | BODY MASS INDEX: 43.02 KG/M2 | SYSTOLIC BLOOD PRESSURE: 134 MMHG | RESPIRATION RATE: 18 BRPM

## 2021-03-16 DIAGNOSIS — Z00.00 ENCOUNTER FOR ANNUAL HEALTH EXAMINATION: Primary | ICD-10-CM

## 2021-03-16 DIAGNOSIS — I10 BENIGN ESSENTIAL HTN: ICD-10-CM

## 2021-03-16 DIAGNOSIS — M25.561 CHRONIC PAIN OF RIGHT KNEE: ICD-10-CM

## 2021-03-16 DIAGNOSIS — R73.03 PREDIABETES: ICD-10-CM

## 2021-03-16 DIAGNOSIS — E66.01 OBESITY, MORBID, BMI 40.0-49.9 (HCC): ICD-10-CM

## 2021-03-16 DIAGNOSIS — E03.9 ACQUIRED HYPOTHYROIDISM: ICD-10-CM

## 2021-03-16 DIAGNOSIS — J31.0 NONALLERGIC RHINITIS: ICD-10-CM

## 2021-03-16 DIAGNOSIS — F41.1 GENERALIZED ANXIETY DISORDER: ICD-10-CM

## 2021-03-16 DIAGNOSIS — Z12.31 ENCOUNTER FOR SCREENING MAMMOGRAM FOR MALIGNANT NEOPLASM OF BREAST: ICD-10-CM

## 2021-03-16 DIAGNOSIS — G43.709 CHRONIC MIGRAINE WITHOUT AURA WITHOUT STATUS MIGRAINOSUS, NOT INTRACTABLE: ICD-10-CM

## 2021-03-16 DIAGNOSIS — M17.12 PRIMARY OSTEOARTHRITIS OF LEFT KNEE: ICD-10-CM

## 2021-03-16 DIAGNOSIS — G47.33 OBSTRUCTIVE SLEEP APNEA SYNDROME: ICD-10-CM

## 2021-03-16 DIAGNOSIS — F33.42 RECURRENT MAJOR DEPRESSIVE DISORDER, IN FULL REMISSION (HCC): ICD-10-CM

## 2021-03-16 DIAGNOSIS — G89.29 CHRONIC PAIN OF RIGHT KNEE: ICD-10-CM

## 2021-03-16 DIAGNOSIS — Z86.718 HISTORY OF DVT OF LOWER EXTREMITY: ICD-10-CM

## 2021-03-16 DIAGNOSIS — E78.2 MIXED DYSLIPIDEMIA: ICD-10-CM

## 2021-03-16 PROBLEM — M75.52 BURSITIS, SHOULDER, LEFT: Status: RESOLVED | Noted: 2017-03-24 | Resolved: 2021-03-16

## 2021-03-16 PROBLEM — M25.851 RIGHT HIP IMPINGEMENT SYNDROME: Status: RESOLVED | Noted: 2019-12-06 | Resolved: 2021-03-16

## 2021-03-16 PROBLEM — M75.42 IMPINGEMENT SYNDROME, SHOULDER, LEFT: Status: RESOLVED | Noted: 2017-03-24 | Resolved: 2021-03-16

## 2021-03-16 PROBLEM — M75.20 BICEPS TENDONITIS: Status: RESOLVED | Noted: 2017-03-14 | Resolved: 2021-03-16

## 2021-03-16 PROBLEM — D36.7: Status: RESOLVED | Noted: 2017-12-22 | Resolved: 2021-03-16

## 2021-03-16 PROBLEM — M25.551 RIGHT HIP PAIN: Status: RESOLVED | Noted: 2019-11-09 | Resolved: 2021-03-16

## 2021-03-16 PROBLEM — M54.31 RIGHT SIDED SCIATICA: Status: RESOLVED | Noted: 2019-11-09 | Resolved: 2021-03-16

## 2021-03-16 PROBLEM — F33.1 MODERATE EPISODE OF RECURRENT MAJOR DEPRESSIVE DISORDER (HCC): Status: RESOLVED | Noted: 2017-12-22 | Resolved: 2021-03-16

## 2021-03-16 PROCEDURE — 99214 OFFICE O/P EST MOD 30 MIN: CPT | Performed by: FAMILY MEDICINE

## 2021-03-16 PROCEDURE — G0444 DEPRESSION SCREEN ANNUAL: HCPCS | Performed by: FAMILY MEDICINE

## 2021-03-16 PROCEDURE — G0439 PPPS, SUBSEQ VISIT: HCPCS | Performed by: FAMILY MEDICINE

## 2021-03-16 RX ORDER — LISINOPRIL 10 MG/1
10 TABLET ORAL DAILY
Qty: 90 TABLET | Refills: 0 | Status: SHIPPED | OUTPATIENT
Start: 2021-03-16 | End: 2021-05-24

## 2021-03-16 RX ORDER — LEVOTHYROXINE SODIUM 0.07 MG/1
75 TABLET ORAL DAILY
Qty: 90 TABLET | Refills: 0 | Status: SHIPPED | OUTPATIENT
Start: 2021-03-16 | End: 2021-03-25

## 2021-03-16 RX ORDER — CYANOCOBALAMIN (VITAMIN B-12) 1000 MCG
1 TABLET, EXTENDED RELEASE ORAL DAILY
COMMUNITY
End: 2021-08-31 | Stop reason: ALTCHOICE

## 2021-03-16 RX ORDER — MONTELUKAST SODIUM 10 MG/1
10 TABLET ORAL DAILY
Qty: 90 TABLET | Refills: 3 | Status: SHIPPED | OUTPATIENT
Start: 2021-03-16

## 2021-03-16 NOTE — PATIENT INSTRUCTIONS
Gustavo Parsons's SCREENING SCHEDULE   Tests on this list are recommended by your physician but may not be covered, or covered at this frequency, by your insurer. Please check with your insurance carrier before scheduling to verify coverage.    PREVENT cigarettes in their lifetime   • Anyone with a family history    Colorectal Cancer Screening  Covered up to Age 76     Colonoscopy Screen   Covered every 10 years- more often if abnormal Colonoscopy due on 04/07/2022 Update [facility] if applicable placed or performed in visit on 09/09/19   • FLU VACC HIGH DOSE PRSV FREE   Orders placed or performed in visit on 12/20/16   • FLU VACC PRSV FREE INC ANTIG    Please get every year    Pneumococcal 13 (Prevnar)  Covered Once after 65 No orders found for th Association regarding Advance Directives.

## 2021-03-16 NOTE — PROGRESS NOTES
HPI:   Timothy Sotelo is a 79year old female who presents for a Medicare Subsequent Annual Wellness visit (Pt already had Initial Annual Wellness). Kandace Parsons is a 79year old female who presents for recheck of hyperlipidemia.  Patient r hopeful. Denies SI and HI. Denies crying spells or hopelessness. Denies loss of appetite. Does admit to weight gain with isolation from the pandemic    Obstructive sleep apnea-uses CPAP nightly. Feels refreshed for annual appointment.     History of no  as PCP - General (Family Practice)  Joselin Dominguez [facility] [place] [facility] [facility]  Cosme Deutsch MD ([facility])  Ruthy Peraza MD , Bob Vasquez  ([address])  Emelia Stroud DO ([address])  Gayatri Stroud MD (NEPHROLOGY)    Patient Active Problem List:     Mixed WBC 7.2 08/24/2020    HGB 13.2 08/24/2020    .0 08/24/2020        ALLERGIES:   She is allergic to sulfa antibiotics. CURRENT MEDICATIONS:   NON FORMULARY, Apply 100 mg topically once as needed.  Avexia medical cannabis pain relief balm 100 mg THC Extrinsic asthma, unspecified, HEADACHES, History of colonoscopy with polypectomy (2012), Hypothyroidism (2011), Migraines, Normal vaginal delivery, Other and unspecified hyperlipidemia, Prediabetes, Shoulder pain, left, Sleep apnea, Thyroid condition, Uns SpO2 97%   BMI 42.97 kg/m²  Estimated body mass index is 42.97 kg/m² as calculated from the following:    Height as of this encounter: 5' 5\" (1.651 m). Weight as of this encounter: 258 lb 3.2 oz (117.1 kg).     Medicare Hearing Assessment  (Required for 72 yr and older (74678) 09/22/2017, 09/21/2018   • FLUZONE 6 months and older PFS 0.5 ml (82583) 10/15/2015   • Fluvirin, 3 Years & >, Im 09/09/2014   • Fluzone Vaccine Medicare () 12/20/2016, 09/09/2019   • HIGH DOSE FLU 65 YRS AND OLDER PRSV FREE SI days weekly  Recheck fasting labs inow and in 6 mos    Obesity, morbid, BMI 40.0-49.9 ([facility]  -     PULMONARY - INTERNAL-[place]  Encouraged to return to weight loss clinic with Dr. Shamar Head  Once fully vaccinated with COVID-19 recommend going to the gym to swi No  Has your appetite been poor?: No  How does the patient maintain a good energy level?: Other  How would you describe your current health state?: Good  How do you maintain positive mental well-being?: Social Interaction; Visiting Family      This section due on 02/19/2021 Update Health Maintenance if applicable     Immunizations (Update Immunization Activity if applicable)     Influenza  Covered Annually 9/24/2020 Please get every year    Pneumococcal 13 (Prevnar)  Covered Once after 65 05/20/2016 Please g

## 2021-03-17 ENCOUNTER — TELEPHONE (OUTPATIENT)
Dept: FAMILY MEDICINE CLINIC | Facility: CLINIC | Age: 71
End: 2021-03-17

## 2021-03-17 NOTE — TELEPHONE ENCOUNTER
_______________________________________________________  Referred to Provider Information:  Provider Address Phone   Lc Zepeda MD [address] [phone]      Last visit 2017-needs to see Dr. Espitia Sic to re

## 2021-03-18 ENCOUNTER — TELEPHONE (OUTPATIENT)
Dept: FAMILY MEDICINE CLINIC | Facility: CLINIC | Age: 71
End: 2021-03-18

## 2021-03-18 ENCOUNTER — NURSE ONLY (OUTPATIENT)
Dept: FAMILY MEDICINE CLINIC | Facility: CLINIC | Age: 71
End: 2021-03-18
Payer: MEDICARE

## 2021-03-18 DIAGNOSIS — R73.03 PREDIABETES: ICD-10-CM

## 2021-03-18 DIAGNOSIS — M17.12 PRIMARY OSTEOARTHRITIS OF LEFT KNEE: Primary | ICD-10-CM

## 2021-03-18 DIAGNOSIS — E03.9 ACQUIRED HYPOTHYROIDISM: ICD-10-CM

## 2021-03-18 DIAGNOSIS — M25.561 CHRONIC PAIN OF RIGHT KNEE: ICD-10-CM

## 2021-03-18 DIAGNOSIS — I10 BENIGN ESSENTIAL HTN: ICD-10-CM

## 2021-03-18 DIAGNOSIS — E78.2 MIXED DYSLIPIDEMIA: ICD-10-CM

## 2021-03-18 DIAGNOSIS — G89.29 CHRONIC PAIN OF RIGHT KNEE: ICD-10-CM

## 2021-03-18 LAB
ALBUMIN SERPL-MCNC: 3.5 G/DL (ref 3.4–5)
ALBUMIN/GLOB SERPL: 1.1 {RATIO} (ref 1–2)
ALP LIVER SERPL-CCNC: 86 U/L
ALT SERPL-CCNC: 62 U/L
ANION GAP SERPL CALC-SCNC: 6 MMOL/L (ref 0–18)
AST SERPL-CCNC: 41 U/L (ref 15–37)
BASOPHILS # BLD AUTO: 0.05 X10(3) UL (ref 0–0.2)
BASOPHILS NFR BLD AUTO: 0.8 %
BILIRUB SERPL-MCNC: 0.4 MG/DL (ref 0.1–2)
BUN BLD-MCNC: 9 MG/DL (ref 7–18)
BUN/CREAT SERPL: 11.1 (ref 10–20)
CALCIUM BLD-MCNC: 8.9 MG/DL (ref 8.5–10.1)
CHLORIDE SERPL-SCNC: 105 MMOL/L (ref 98–112)
CHOLEST SMN-MCNC: 153 MG/DL (ref ?–200)
CO2 SERPL-SCNC: 26 MMOL/L (ref 21–32)
CREAT BLD-MCNC: 0.81 MG/DL
DEPRECATED RDW RBC AUTO: 43.8 FL (ref 35.1–46.3)
EOSINOPHIL # BLD AUTO: 0.16 X10(3) UL (ref 0–0.7)
EOSINOPHIL NFR BLD AUTO: 2.5 %
ERYTHROCYTE [DISTWIDTH] IN BLOOD BY AUTOMATED COUNT: 13.9 % (ref 11–15)
EST. AVERAGE GLUCOSE BLD GHB EST-MCNC: 154 MG/DL (ref 68–126)
GLOBULIN PLAS-MCNC: 3.2 G/DL (ref 2.8–4.4)
GLUCOSE BLD-MCNC: 95 MG/DL (ref 70–99)
HBA1C MFR BLD HPLC: 7 % (ref ?–5.7)
HCT VFR BLD AUTO: 38.6 %
HDLC SERPL-MCNC: 53 MG/DL (ref 40–59)
HGB BLD-MCNC: 11.7 G/DL
IMM GRANULOCYTES # BLD AUTO: 0.06 X10(3) UL (ref 0–1)
IMM GRANULOCYTES NFR BLD: 0.9 %
LDLC SERPL CALC-MCNC: 71 MG/DL (ref ?–100)
LYMPHOCYTES # BLD AUTO: 2.52 X10(3) UL (ref 1–4)
LYMPHOCYTES NFR BLD AUTO: 39.7 %
M PROTEIN MFR SERPL ELPH: 6.7 G/DL (ref 6.4–8.2)
MCH RBC QN AUTO: 26.4 PG (ref 26–34)
MCHC RBC AUTO-ENTMCNC: 30.3 G/DL (ref 31–37)
MCV RBC AUTO: 86.9 FL
MONOCYTES # BLD AUTO: 0.58 X10(3) UL (ref 0.1–1)
MONOCYTES NFR BLD AUTO: 9.1 %
NEUTROPHILS # BLD AUTO: 2.98 X10 (3) UL (ref 1.5–7.7)
NEUTROPHILS # BLD AUTO: 2.98 X10(3) UL (ref 1.5–7.7)
NEUTROPHILS NFR BLD AUTO: 47 %
NONHDLC SERPL-MCNC: 100 MG/DL (ref ?–130)
OSMOLALITY SERPL CALC.SUM OF ELEC: 282 MOSM/KG (ref 275–295)
PATIENT FASTING Y/N/NP: YES
PATIENT FASTING Y/N/NP: YES
PLATELET # BLD AUTO: 319 10(3)UL (ref 150–450)
POTASSIUM SERPL-SCNC: 4.2 MMOL/L (ref 3.5–5.1)
RBC # BLD AUTO: 4.44 X10(6)UL
SODIUM SERPL-SCNC: 137 MMOL/L (ref 136–145)
T4 FREE SERPL-MCNC: 0.8 NG/DL (ref 0.8–1.7)
TRIGL SERPL-MCNC: 144 MG/DL (ref 30–149)
TSI SER-ACNC: 4.73 MIU/ML (ref 0.36–3.74)
VLDLC SERPL CALC-MCNC: 29 MG/DL (ref 0–30)
WBC # BLD AUTO: 6.4 X10(3) UL (ref 4–11)

## 2021-03-18 PROCEDURE — 85025 COMPLETE CBC W/AUTO DIFF WBC: CPT | Performed by: FAMILY MEDICINE

## 2021-03-18 PROCEDURE — 84443 ASSAY THYROID STIM HORMONE: CPT | Performed by: FAMILY MEDICINE

## 2021-03-18 PROCEDURE — 36415 COLL VENOUS BLD VENIPUNCTURE: CPT | Performed by: FAMILY MEDICINE

## 2021-03-18 PROCEDURE — 80053 COMPREHEN METABOLIC PANEL: CPT | Performed by: FAMILY MEDICINE

## 2021-03-18 PROCEDURE — 80061 LIPID PANEL: CPT | Performed by: FAMILY MEDICINE

## 2021-03-18 PROCEDURE — 84439 ASSAY OF FREE THYROXINE: CPT | Performed by: FAMILY MEDICINE

## 2021-03-18 PROCEDURE — 83036 HEMOGLOBIN GLYCOSYLATED A1C: CPT | Performed by: FAMILY MEDICINE

## 2021-03-18 RX ORDER — DICLOFENAC SODIUM 30 MG/G
2 GEL TOPICAL 3 TIMES DAILY PRN
Qty: 100 G | Refills: 3 | Status: SHIPPED | OUTPATIENT
Start: 2021-03-18 | End: 2021-11-23

## 2021-03-18 NOTE — TELEPHONE ENCOUNTER
Unaware of any topical cream specifically for restless leg. Most medications used or taken at bedtime and are sedating    May try over-the-counter Biofreeze and now over-the-counter is Voltaren cream 1%.   She may try putting it on her legs if there is kayli

## 2021-03-18 NOTE — TELEPHONE ENCOUNTER
Patient reports her restless leg syndrome is really bothering her  She wanted to avoid sedating medications  Are there any creams or anything else she can apply to legs?       She has an apt with psychiatrist next Tuesday - Dr Maxine Solis  Reports he had prescrib

## 2021-03-19 NOTE — TELEPHONE ENCOUNTER
LMOM to return call to the office. Provided pt office phone [phone] along with office hours.     Also my chart message sent

## 2021-03-25 ENCOUNTER — OFFICE VISIT (OUTPATIENT)
Dept: FAMILY MEDICINE CLINIC | Facility: CLINIC | Age: 71
End: 2021-03-25
Payer: MEDICARE

## 2021-03-25 VITALS
HEART RATE: 103 BPM | BODY MASS INDEX: 43.62 KG/M2 | RESPIRATION RATE: 18 BRPM | HEIGHT: 65 IN | TEMPERATURE: 98 F | DIASTOLIC BLOOD PRESSURE: 76 MMHG | OXYGEN SATURATION: 96 % | WEIGHT: 261.81 LBS | SYSTOLIC BLOOD PRESSURE: 128 MMHG

## 2021-03-25 DIAGNOSIS — E11.9 NEW ONSET TYPE 2 DIABETES MELLITUS (HCC): Primary | ICD-10-CM

## 2021-03-25 DIAGNOSIS — E78.2 MIXED DYSLIPIDEMIA: ICD-10-CM

## 2021-03-25 DIAGNOSIS — I10 BENIGN ESSENTIAL HTN: ICD-10-CM

## 2021-03-25 DIAGNOSIS — E03.9 ACQUIRED HYPOTHYROIDISM: ICD-10-CM

## 2021-03-25 DIAGNOSIS — E66.01 MORBID OBESITY WITH BMI OF 40.0-44.9, ADULT (HCC): ICD-10-CM

## 2021-03-25 DIAGNOSIS — Z23 NEED FOR VACCINATION: ICD-10-CM

## 2021-03-25 LAB
CREAT UR-SCNC: 34.7 MG/DL
MICROALBUMIN UR-MCNC: 1.37 MG/DL
MICROALBUMIN/CREAT 24H UR-RTO: 39.5 UG/MG (ref ?–30)

## 2021-03-25 PROCEDURE — G0010 ADMIN HEPATITIS B VACCINE: HCPCS | Performed by: FAMILY MEDICINE

## 2021-03-25 PROCEDURE — 99214 OFFICE O/P EST MOD 30 MIN: CPT | Performed by: FAMILY MEDICINE

## 2021-03-25 PROCEDURE — 93000 ELECTROCARDIOGRAM COMPLETE: CPT | Performed by: FAMILY MEDICINE

## 2021-03-25 PROCEDURE — 90746 HEPB VACCINE 3 DOSE ADULT IM: CPT | Performed by: FAMILY MEDICINE

## 2021-03-25 PROCEDURE — 82043 UR ALBUMIN QUANTITATIVE: CPT | Performed by: FAMILY MEDICINE

## 2021-03-25 PROCEDURE — 82570 ASSAY OF URINE CREATININE: CPT | Performed by: FAMILY MEDICINE

## 2021-03-25 RX ORDER — DULAGLUTIDE 0.75 MG/.5ML
0.75 INJECTION, SOLUTION SUBCUTANEOUS WEEKLY
Qty: 3 ML | Refills: 0 | Status: SHIPPED | OUTPATIENT
Start: 2021-03-25 | End: 2021-07-09

## 2021-03-25 RX ORDER — LEVOTHYROXINE SODIUM 88 UG/1
88 TABLET ORAL
Qty: 90 TABLET | Refills: 0 | Status: SHIPPED | OUTPATIENT
Start: 2021-03-25 | End: 2021-07-15

## 2021-03-25 NOTE — PROGRESS NOTES
Patient presents with:  Diabetes    DM, HL, HTN f/u. HPI:   Peg Doherty is a 79year old female who presents for new onset diabetes. Previously prediabetic for several years. Gained weight during pandemic. Has never monitored blood sugars.   Un 156   01/31/2020 142   05/24/2006 175     HDL Cholesterol (mg/dL)   Date Value   03/18/2021 53   08/24/2020 66 (H)   01/31/2020 54   05/24/2006 73 (H)     LDL Cholesterol Calc (mg/dL)   Date Value   05/24/2006 87     LDL Cholesterol (mg/dL)   Date Value Tab TK 1 T PO  QPM  0   • ClonazePAM 0.5 MG Oral Tab TK SS T PO BID UTD  0   • Cranberry 125 MG Oral Tab Take by mouth. • Calcium Carbonate-Vitamin D (OS-SHELBY 500 + D OR) Take 1 tablet by mouth 2 (two) times daily.      • Probiotic Product (PROBIOTIC OR) unspecified    • HEADACHES     uses depakote   • History of colonoscopy with polypectomy 2012    hemorrhoids- repeat colonscopy 4/7/2017 negative- repeat in 5 years. • Hypothyroidism 2011    acquired.  no cancer or nodules   • Impingement syndrome, should HEENT: is unremarkable, PERRLA and EOMI. No carotid bruits. No thyromegaly or masses. Heart: Regular rate and rhythm. S1, S2 no murmurs. Lungs: Clear to auscultation bilaterally, with no wheeze, rhonchi, or rales.     Abdomen: soft, NT/ND no HSM and N mass, dysphasia, dyspnea, persistent hoarseness, stop medication and call.   Any severe symptoms with side effects such as epigastric pain, vomiting, angioedema, increased heart rate, call ASAP    discussion over compliance with medication and controlling d skin once a week. Patient taking differently: Inject 0.75 mg into the skin once a week.  Date given: 3/25/2021  Lot #: [id number]  [address] #: none  Exp. date: 7/2022  Qty: 2   • Levothyroxine Sodium 88 MCG Oral Tab 90 tablet 0     Sig: Take 1 tablet (88 mcg t

## 2021-03-29 ENCOUNTER — TELEPHONE (OUTPATIENT)
Dept: FAMILY MEDICINE CLINIC | Facility: CLINIC | Age: 71
End: 2021-03-29

## 2021-03-29 NOTE — TELEPHONE ENCOUNTER
LMOM to return call to the office to go over results or view on mychart.  Provided pt office phone [phone]

## 2021-03-29 NOTE — TELEPHONE ENCOUNTER
----- Message from Ofelia Schaefer DO sent at 3/25/2021 10:31 PM CDT -----  Results reviewed. Released to [address] Will discuss with patient at next visit. Mikael Marino,  I have reviewed your test results.   Urine microalbumin is positive and indicates the diabet

## 2021-03-30 ENCOUNTER — TELEPHONE (OUTPATIENT)
Dept: FAMILY MEDICINE CLINIC | Facility: CLINIC | Age: 71
End: 2021-03-30

## 2021-03-30 NOTE — TELEPHONE ENCOUNTER
LMOM to return call to the office as this is 2nd attempt to reach you. Provided pt office phone [phone] along with office hours.     Lm for patient to call back to review results in my chart

## 2021-03-30 NOTE — TELEPHONE ENCOUNTER
Patient reports pharmacy said Trulicity is $343  Discussed the financial assistance with patient via [employer]  [email or web address] com    Patient will look at option for financial assistance on [employer]

## 2021-04-01 NOTE — TELEPHONE ENCOUNTER
Received forms from [facility] to complete for assistance with Trulicity  Forms completed and signed by PCP  Faxed to [facility]  Conf rec'd  Copy to scanning

## 2021-04-01 NOTE — TELEPHONE ENCOUNTER
Received [employer] form for [employer]. Completed and placed in provider bin for signature.

## 2021-04-19 DIAGNOSIS — G43.709 CHRONIC MIGRAINE WITHOUT AURA WITHOUT STATUS MIGRAINOSUS, NOT INTRACTABLE: ICD-10-CM

## 2021-04-19 NOTE — TELEPHONE ENCOUNTER
Sent the patient a [employer] message asking to make an appt for further medication refills.        Medication: DIVALPROEX SODIUM  MG Oral Tablet 24 Hr    Date of last refill: 04/15/2020 (#60/11)  [place]    Last o

## 2021-04-20 RX ORDER — DIVALPROEX SODIUM 500 MG/1
TABLET, EXTENDED RELEASE ORAL
Qty: 180 TABLET | Refills: 0 | Status: SHIPPED | OUTPATIENT
Start: 2021-04-20 | End: 2021-07-10

## 2021-04-21 ENCOUNTER — TELEPHONE (OUTPATIENT)
Dept: HEMATOLOGY/ONCOLOGY | Age: 71
End: 2021-04-21

## 2021-04-21 NOTE — TELEPHONE ENCOUNTER
Patient is asking to have her Xarelto filled because she is going out of town and need to know if she need a Follow Visit and she was wandering if she need an Ultrasound. Please call patient. Last seen 11/6/19.       Per Dr. Mac Villegas note on 11/19/2019,

## 2021-04-30 PROBLEM — Z96.659 PAIN IN KNEE REGION AFTER TOTAL KNEE REPLACEMENT (HCC): Status: ACTIVE | Noted: 2019-11-09

## 2021-04-30 PROBLEM — T84.84XA PAIN IN KNEE REGION AFTER TOTAL KNEE REPLACEMENT (HCC): Status: ACTIVE | Noted: 2019-11-09

## 2021-04-30 PROBLEM — T84.84XA PAIN IN KNEE REGION AFTER TOTAL KNEE REPLACEMENT: Status: ACTIVE | Noted: 2019-11-09

## 2021-04-30 PROBLEM — T84.84XA: Status: ACTIVE | Noted: 2019-11-09

## 2021-04-30 PROBLEM — Z96.659: Status: ACTIVE | Noted: 2019-11-09

## 2021-04-30 PROBLEM — Z96.659 PAIN IN KNEE REGION AFTER TOTAL KNEE REPLACEMENT: Status: ACTIVE | Noted: 2019-11-09

## 2021-05-03 ENCOUNTER — HOSPITAL ENCOUNTER (OUTPATIENT)
Dept: MAMMOGRAPHY | Age: 71
Discharge: HOME | End: 2021-05-03
Attending: FAMILY MEDICINE
Payer: MEDICARE

## 2021-05-03 DIAGNOSIS — E78.2 MIXED DYSLIPIDEMIA: ICD-10-CM

## 2021-05-03 DIAGNOSIS — Z12.31 ENCOUNTER FOR SCREENING MAMMOGRAM FOR MALIGNANT NEOPLASM OF BREAST: ICD-10-CM

## 2021-05-03 PROCEDURE — 77063 BREAST TOMOSYNTHESIS BI: CPT | Performed by: FAMILY MEDICINE

## 2021-05-03 PROCEDURE — 77067 SCR MAMMO BI INCL CAD: CPT | Performed by: FAMILY MEDICINE

## 2021-05-03 RX ORDER — ATORVASTATIN CALCIUM 40 MG/1
40 TABLET, FILM COATED ORAL NIGHTLY
Qty: 90 TABLET | Refills: 0 | Status: SHIPPED | OUTPATIENT
Start: 2021-05-03 | End: 2021-08-03

## 2021-05-03 NOTE — TELEPHONE ENCOUNTER
Pt requesting refill of   Requested Prescriptions     Pending Prescriptions Disp Refills   • ATORVASTATIN 40 MG Oral Tab [Pharmacy Med Name: ATORVASTATIN 40 MG TABLET] 90 tablet 1     Sig: TAKE 1 TABLET (40 MG TOTAL) BY MOUTH NIGHTLY.  AVOID GRAPEFRUIT JUIC

## 2021-05-13 ENCOUNTER — TELEPHONE (OUTPATIENT)
Dept: FAMILY MEDICINE CLINIC | Facility: CLINIC | Age: 71
End: 2021-05-13

## 2021-05-13 DIAGNOSIS — M17.12 PRIMARY OSTEOARTHRITIS OF LEFT KNEE: ICD-10-CM

## 2021-05-13 DIAGNOSIS — M54.31 RIGHT SIDED SCIATICA: ICD-10-CM

## 2021-05-13 DIAGNOSIS — M25.551 RIGHT HIP PAIN: ICD-10-CM

## 2021-05-13 DIAGNOSIS — E66.01 MORBID OBESITY WITH BMI OF 40.0-44.9, ADULT (HCC): Primary | ICD-10-CM

## 2021-05-13 NOTE — TELEPHONE ENCOUNTER
Pt called office stating she needs an order for a motorized recliner chair since she pt is having trouble getting out of her recliner chair.

## 2021-05-17 NOTE — TELEPHONE ENCOUNTER
Spoke with patient. Advised should check with Medicare whether motorized recliner can be covered. Patient verbalized understanding. She will let us know, along with where to send order.

## 2021-05-17 NOTE — TELEPHONE ENCOUNTER
LMOM to return call to the office. Provided pt office phone [phone] along with office hours. Need to verify what patient is looking for. She may need to check coverage with insurance.

## 2021-05-21 NOTE — TELEPHONE ENCOUNTER
Spoke with the patient and she spoke with her insurance and she is looking for a seat moving mechanism.  This can be added to her current recliner> this is covered by her insurance and will just need a Dr notes stating she has severe arthritis in her needs

## 2021-05-23 DIAGNOSIS — I10 BENIGN ESSENTIAL HTN: ICD-10-CM

## 2021-05-24 RX ORDER — BLOOD SUGAR DIAGNOSTIC
1 STRIP MISCELLANEOUS 2 TIMES DAILY
COMMUNITY
Start: 2021-03-25 | End: 2021-06-17

## 2021-05-24 RX ORDER — LANCETS
EACH MISCELLANEOUS 2 TIMES DAILY
COMMUNITY
Start: 2021-03-25 | End: 2021-06-21

## 2021-05-24 RX ORDER — LISINOPRIL 10 MG/1
TABLET ORAL
Qty: 90 TABLET | Refills: 0 | Status: SHIPPED | OUTPATIENT
Start: 2021-05-24 | End: 2021-08-30

## 2021-05-24 RX ORDER — BLOOD-GLUCOSE METER
1 EACH MISCELLANEOUS 2 TIMES DAILY
COMMUNITY
Start: 2021-03-25 | End: 2021-06-23

## 2021-05-24 NOTE — TELEPHONE ENCOUNTER
Pt requesting refill of   Requested Prescriptions     Pending Prescriptions Disp Refills   • LISINOPRIL 10 MG Oral Tab [Pharmacy Med Name: LISINOPRIL 10 MG TABLET] 90 tablet 0     Sig: TAKE 1 TABLET BY MOUTH EVERY DAY     Passed protocol, refill approved,

## 2021-05-26 PROBLEM — M25.562 CHRONIC PAIN OF LEFT KNEE: Status: ACTIVE | Noted: 2019-11-09

## 2021-05-26 PROBLEM — G89.29 CHRONIC PAIN OF LEFT KNEE: Status: ACTIVE | Noted: 2019-11-09

## 2021-05-26 NOTE — TELEPHONE ENCOUNTER
Received call from [address]. They do not carry seat lift mechanism. Will need to locate alternative supplier.

## 2021-05-27 ENCOUNTER — HOSPITAL ENCOUNTER (OUTPATIENT)
Dept: MRI IMAGING | Age: 71
Discharge: HOME | End: 2021-05-27
Attending: PAIN MEDICINE
Payer: MEDICARE

## 2021-05-27 DIAGNOSIS — G89.29 CHRONIC PAIN OF LEFT KNEE: ICD-10-CM

## 2021-05-27 DIAGNOSIS — T84.84XD PAIN IN KNEE REGION AFTER TOTAL KNEE REPLACEMENT, SUBSEQUENT ENCOUNTER: ICD-10-CM

## 2021-05-27 DIAGNOSIS — M25.562 CHRONIC PAIN OF LEFT KNEE: ICD-10-CM

## 2021-05-27 DIAGNOSIS — Z96.659 PAIN IN KNEE REGION AFTER TOTAL KNEE REPLACEMENT, SUBSEQUENT ENCOUNTER: ICD-10-CM

## 2021-05-27 DIAGNOSIS — M17.12 PRIMARY OSTEOARTHRITIS OF LEFT KNEE: ICD-10-CM

## 2021-05-27 PROCEDURE — 72148 MRI LUMBAR SPINE W/O DYE: CPT | Performed by: PAIN MEDICINE

## 2021-05-28 NOTE — TELEPHONE ENCOUNTER
The following documentation is required by Medicare to be in clinic visit note for consideration of payment.   Documentation from the medical record should indicate the diagnosis responsible for the needed equipment and support all of  the following criteri

## 2021-05-28 NOTE — TELEPHONE ENCOUNTER
LMOM to return call to the office. Provided pt office phone [phone] along with office hours.   Patient will need office visit to discuss electronic chair  Lift  ALso unable to find electronic chair lift, staff will need to continue looking

## 2021-06-01 DIAGNOSIS — Z87.19 HISTORY OF GASTRITIS: ICD-10-CM

## 2021-06-02 RX ORDER — OMEPRAZOLE 40 MG/1
CAPSULE, DELAYED RELEASE ORAL
Qty: 90 CAPSULE | Refills: 0 | Status: SHIPPED | OUTPATIENT
Start: 2021-06-02 | End: 2021-08-27

## 2021-06-07 NOTE — TELEPHONE ENCOUNTER
Discussed with patient, notified she likely does not meet Medicare Requirements. She understands. She will continue to follow-up with Dr. Gladys Taylor for Pain Mgmt. She will consider purchasing a new chair herself.      Offered appt in office to follow-up

## 2021-06-17 DIAGNOSIS — E03.9 ACQUIRED HYPOTHYROIDISM: ICD-10-CM

## 2021-06-17 NOTE — TELEPHONE ENCOUNTER
Pt requesting refill of   Requested Prescriptions     Pending Prescriptions Disp Refills   • LEVOTHYROXINE SODIUM 88 MCG Oral Tab [Pharmacy Med Name: LEVOTHYROXINE 88 MCG TABLET] 90 tablet 0     Sig: TAKE 1 TABLET BY MOUTH BEFORE BREAKFAST *NEED LABS BEF

## 2021-06-21 RX ORDER — LANCETS
EACH MISCELLANEOUS
Qty: 100 EACH | Refills: 3 | Status: SHIPPED | OUTPATIENT
Start: 2021-06-21

## 2021-06-21 NOTE — TELEPHONE ENCOUNTER
Pt requesting refill of   Requested Prescriptions     Pending Prescriptions Disp Refills   • ACCU-CHEK SOFTCLIX LANCETS Does not apply Misc [Pharmacy Med Name: Rita Balbuena  0     Sig: TEST BLOOD SUGARS TWICE A DAY       Passed protocol, r

## 2021-06-23 RX ORDER — BLOOD-GLUCOSE METER
EACH MISCELLANEOUS
Qty: 1 KIT | Refills: 0 | Status: SHIPPED | OUTPATIENT
Start: 2021-06-23

## 2021-06-24 RX ORDER — LEVOTHYROXINE SODIUM 88 UG/1
TABLET ORAL
Qty: 90 TABLET | Refills: 0 | OUTPATIENT
Start: 2021-06-24

## 2021-06-29 PROBLEM — M54.16 LUMBAR RADICULITIS: Status: ACTIVE | Noted: 2021-06-29

## 2021-06-29 PROBLEM — G89.29 CHRONIC KNEE PAIN AFTER TOTAL REPLACEMENT OF RIGHT KNEE JOINT: Status: ACTIVE | Noted: 2021-06-29

## 2021-06-29 PROBLEM — M48.062 SPINAL STENOSIS OF LUMBAR REGION WITH NEUROGENIC CLAUDICATION: Status: ACTIVE | Noted: 2021-06-29

## 2021-06-29 PROBLEM — M25.561 CHRONIC KNEE PAIN AFTER TOTAL REPLACEMENT OF RIGHT KNEE JOINT: Status: ACTIVE | Noted: 2021-06-29

## 2021-06-29 PROBLEM — Z96.651 CHRONIC KNEE PAIN AFTER TOTAL REPLACEMENT OF RIGHT KNEE JOINT: Status: ACTIVE | Noted: 2021-06-29

## 2021-07-07 ENCOUNTER — LABORATORY ENCOUNTER (OUTPATIENT)
Dept: LAB | Age: 71
End: 2021-07-07
Attending: FAMILY MEDICINE
Payer: MEDICARE

## 2021-07-07 DIAGNOSIS — E78.2 MIXED DYSLIPIDEMIA: ICD-10-CM

## 2021-07-07 DIAGNOSIS — I10 BENIGN ESSENTIAL HTN: ICD-10-CM

## 2021-07-07 DIAGNOSIS — E11.9 NEW ONSET TYPE 2 DIABETES MELLITUS (HCC): ICD-10-CM

## 2021-07-07 DIAGNOSIS — E03.9 ACQUIRED HYPOTHYROIDISM: ICD-10-CM

## 2021-07-07 LAB
ALBUMIN SERPL-MCNC: 3.7 G/DL (ref 3.4–5)
ALBUMIN/GLOB SERPL: 1 {RATIO} (ref 1–2)
ALP LIVER SERPL-CCNC: 93 U/L
ALT SERPL-CCNC: 32 U/L
ANION GAP SERPL CALC-SCNC: 8 MMOL/L (ref 0–18)
AST SERPL-CCNC: 23 U/L (ref 15–37)
BILIRUB SERPL-MCNC: 0.5 MG/DL (ref 0.1–2)
BUN BLD-MCNC: 8 MG/DL (ref 7–18)
BUN/CREAT SERPL: 9 (ref 10–20)
CALCIUM BLD-MCNC: 9 MG/DL (ref 8.5–10.1)
CHLORIDE SERPL-SCNC: 100 MMOL/L (ref 98–112)
CHOLEST SMN-MCNC: 159 MG/DL (ref ?–200)
CO2 SERPL-SCNC: 26 MMOL/L (ref 21–32)
CREAT BLD-MCNC: 0.89 MG/DL
EST. AVERAGE GLUCOSE BLD GHB EST-MCNC: 143 MG/DL (ref 68–126)
GLOBULIN PLAS-MCNC: 3.7 G/DL (ref 2.8–4.4)
GLUCOSE BLD-MCNC: 112 MG/DL (ref 70–99)
HBA1C MFR BLD HPLC: 6.6 % (ref ?–5.7)
HDLC SERPL-MCNC: 59 MG/DL (ref 40–59)
LDLC SERPL CALC-MCNC: 76 MG/DL (ref ?–100)
M PROTEIN MFR SERPL ELPH: 7.4 G/DL (ref 6.4–8.2)
NONHDLC SERPL-MCNC: 100 MG/DL (ref ?–130)
OSMOLALITY SERPL CALC.SUM OF ELEC: 277 MOSM/KG (ref 275–295)
PATIENT FASTING Y/N/NP: YES
PATIENT FASTING Y/N/NP: YES
POTASSIUM SERPL-SCNC: 4.5 MMOL/L (ref 3.5–5.1)
SODIUM SERPL-SCNC: 134 MMOL/L (ref 136–145)
T4 FREE SERPL-MCNC: 0.8 NG/DL (ref 0.8–1.7)
TRIGL SERPL-MCNC: 141 MG/DL (ref 30–149)
TSI SER-ACNC: 1.28 MIU/ML (ref 0.36–3.74)
VLDLC SERPL CALC-MCNC: 22 MG/DL (ref 0–30)

## 2021-07-07 PROCEDURE — 84439 ASSAY OF FREE THYROXINE: CPT

## 2021-07-07 PROCEDURE — 83036 HEMOGLOBIN GLYCOSYLATED A1C: CPT

## 2021-07-07 PROCEDURE — 80061 LIPID PANEL: CPT

## 2021-07-07 PROCEDURE — 80053 COMPREHEN METABOLIC PANEL: CPT

## 2021-07-07 PROCEDURE — 84443 ASSAY THYROID STIM HORMONE: CPT

## 2021-07-07 PROCEDURE — 36415 COLL VENOUS BLD VENIPUNCTURE: CPT

## 2021-07-08 ENCOUNTER — TELEPHONE (OUTPATIENT)
Dept: FAMILY MEDICINE CLINIC | Facility: CLINIC | Age: 71
End: 2021-07-08

## 2021-07-08 DIAGNOSIS — E11.9 NEW ONSET TYPE 2 DIABETES MELLITUS (HCC): ICD-10-CM

## 2021-07-08 DIAGNOSIS — G43.709 CHRONIC MIGRAINE WITHOUT AURA WITHOUT STATUS MIGRAINOSUS, NOT INTRACTABLE: ICD-10-CM

## 2021-07-08 NOTE — TELEPHONE ENCOUNTER
Patient has not used her trulicity that she has had since [employer]. She did not refrigerate it has been in her house wants to know if they are still good to use.

## 2021-07-08 NOTE — TELEPHONE ENCOUNTER
Discussed with patient. Per [facility] site, if Trulicity left un-refridgerated for more than 14 days, should discard. Advised to bring to pharmacy or our office for proper disposal.   She receives Trulicity from .  She will contact for

## 2021-07-09 ENCOUNTER — PATIENT MESSAGE (OUTPATIENT)
Dept: FAMILY MEDICINE CLINIC | Facility: CLINIC | Age: 71
End: 2021-07-09

## 2021-07-09 ENCOUNTER — TELEPHONE (OUTPATIENT)
Dept: FAMILY MEDICINE CLINIC | Facility: CLINIC | Age: 71
End: 2021-07-09

## 2021-07-09 DIAGNOSIS — E11.9 NEW ONSET TYPE 2 DIABETES MELLITUS (HCC): ICD-10-CM

## 2021-07-09 RX ORDER — DULAGLUTIDE 0.75 MG/.5ML
0.75 INJECTION, SOLUTION SUBCUTANEOUS WEEKLY
Qty: 3 ML | Refills: 0 | COMMUNITY
Start: 2021-07-09 | End: 2021-07-09

## 2021-07-09 RX ORDER — DULAGLUTIDE 0.75 MG/.5ML
0.75 INJECTION, SOLUTION SUBCUTANEOUS WEEKLY
Qty: 3 ML | Refills: 1 | Status: SHIPPED | OUTPATIENT
Start: 2021-07-09 | End: 2021-08-31

## 2021-07-09 NOTE — TELEPHONE ENCOUNTER
Sent the patient a F[employer] message to make an appt for further medication refills.      Medication: DIVALPROEX SODIUM  MG Oral Tablet 24 Hr    Date of last refill: 04/20/2021 (#180/0)  [place]    Last office vis

## 2021-07-09 NOTE — TELEPHONE ENCOUNTER
Patient calling back. She receives Trulicity at discounted price from , but will be out. Patient requesting to  samples from office to last her 30 days. Okay to provide to patient?     Would like to  today at 1 or 2PM

## 2021-07-09 NOTE — TELEPHONE ENCOUNTER
Received assistance paperwork for Trulicity from [facility]. Placed in providers bin for signature. Needs attached printed script. Printed and attached to forms.

## 2021-07-09 NOTE — TELEPHONE ENCOUNTER
From: Raul Enciso  To:  Aldo Lindsey DO  Sent: 7/9/2021 2:46 PM CDT  Subject: Other    Last page of [address] document for KAREN Enciso

## 2021-07-10 RX ORDER — DIVALPROEX SODIUM 500 MG/1
TABLET, EXTENDED RELEASE ORAL
Qty: 180 TABLET | Refills: 0 | Status: SHIPPED | OUTPATIENT
Start: 2021-07-10 | End: 2021-09-01

## 2021-07-12 NOTE — TELEPHONE ENCOUNTER
From: Magdiel Parsons  To: Henrry Whitfield DO  Sent: 7/9/2021 5:18 PM CDT  Subject: Prescription Question    Garrison Habermann,    Here are the redone pages. I’m just sending the whole thing. Thanks!     Sincerely,    Marco Bradley

## 2021-07-14 DIAGNOSIS — E03.9 ACQUIRED HYPOTHYROIDISM: ICD-10-CM

## 2021-07-15 RX ORDER — LEVOTHYROXINE SODIUM 88 UG/1
TABLET ORAL
Qty: 90 TABLET | Refills: 0 | Status: SHIPPED | OUTPATIENT
Start: 2021-07-15 | End: 2021-10-11

## 2021-07-20 NOTE — TELEPHONE ENCOUNTER
Form signed by Rut Bernabe DO   Faxed form and printed script back to [facility] at [phone]  Confirmation received.    Send to scan

## 2021-07-29 DIAGNOSIS — E78.2 MIXED DYSLIPIDEMIA: ICD-10-CM

## 2021-07-29 NOTE — TELEPHONE ENCOUNTER
Pt requesting refill of   Requested Prescriptions     Pending Prescriptions Disp Refills   • ATORVASTATIN 40 MG Oral Tab [Pharmacy Med Name: ATORVASTATIN 40 MG TABLET] 90 tablet 0     Sig: TAKE 1 TABLET (40 MG TOTAL) BY MOUTH NIGHTLY.  AVOID GRAPEFRUIT JUIC

## 2021-08-03 RX ORDER — ATORVASTATIN CALCIUM 40 MG/1
40 TABLET, FILM COATED ORAL NIGHTLY
Qty: 90 TABLET | Refills: 0 | Status: SHIPPED | OUTPATIENT
Start: 2021-08-03 | End: 2021-11-03

## 2021-08-10 PROBLEM — M79.2: Status: ACTIVE | Noted: 2021-08-10

## 2021-08-18 ENCOUNTER — TELEPHONE (OUTPATIENT)
Dept: FAMILY MEDICINE CLINIC | Facility: CLINIC | Age: 71
End: 2021-08-18

## 2021-08-18 DIAGNOSIS — E11.9 NEW ONSET TYPE 2 DIABETES MELLITUS (HCC): ICD-10-CM

## 2021-08-18 NOTE — TELEPHONE ENCOUNTER
Patient is asking for samples of Trulicity. Samples are available in the office. Ok to dispense? Last A1c value was 6.6% done 7/7/2021.     Last office visit pertaining to refill on 3/25/2021  Future Appointments   Date Time Provider Seb Montgomery

## 2021-08-18 NOTE — TELEPHONE ENCOUNTER
Pt is having issues with her insurance and would like another months sample of Trulicity if possible.

## 2021-08-19 DIAGNOSIS — J31.0 NONALLERGIC RHINITIS: ICD-10-CM

## 2021-08-19 RX ORDER — FLUTICASONE PROPIONATE 50 MCG
SPRAY, SUSPENSION (ML) NASAL
Qty: 48 ML | Refills: 1 | Status: SHIPPED | OUTPATIENT
Start: 2021-08-19

## 2021-08-19 NOTE — TELEPHONE ENCOUNTER
Pt requesting refill of   Requested Prescriptions     Pending Prescriptions Disp Refills   • FLUTICASONE PROPIONATE 50 MCG/ACT Nasal Suspension [Pharmacy Med Name: FLUTICASONE PROP 50 MCG SPRAY] 48 mL 1     Sig: SPRAY 2 SPRAYS INTO EACH NOSTRIL EVERY DAY

## 2021-08-20 RX ORDER — DULAGLUTIDE 0.75 MG/.5ML
0.75 INJECTION, SOLUTION SUBCUTANEOUS WEEKLY
Qty: 3 ML | Refills: 0 | COMMUNITY
Start: 2021-08-20 | End: 2022-01-25

## 2021-08-20 NOTE — TELEPHONE ENCOUNTER
Spoke to pt and let her know we can give her 1 month supply samples of trulicity  Patient voiced understanding and states her daughter Omkar Rebolledo will .  She is on pt release for   meds ready for   2 boxes set aside for pt    Enter

## 2021-08-30 ENCOUNTER — TELEPHONE (OUTPATIENT)
Dept: FAMILY MEDICINE CLINIC | Facility: CLINIC | Age: 71
End: 2021-08-30

## 2021-08-30 DIAGNOSIS — I10 BENIGN ESSENTIAL HTN: ICD-10-CM

## 2021-08-30 RX ORDER — LISINOPRIL 10 MG/1
TABLET ORAL
Qty: 90 TABLET | Refills: 0 | Status: SHIPPED | OUTPATIENT
Start: 2021-08-30 | End: 2021-12-09

## 2021-08-30 NOTE — TELEPHONE ENCOUNTER
Called pt and let her know lisinopril 10mg sent to pharmacy today  Pt states pharmacy is telling her that she picked up a 90 day supply of the medication a month ago  I told pt to ask pharmacy to over-ride insurance since she mustve lost medicaiton  Otilio

## 2021-08-31 ENCOUNTER — OFFICE VISIT (OUTPATIENT)
Dept: FAMILY MEDICINE CLINIC | Facility: CLINIC | Age: 71
End: 2021-08-31
Payer: MEDICARE

## 2021-08-31 VITALS
HEIGHT: 64 IN | OXYGEN SATURATION: 96 % | HEART RATE: 111 BPM | TEMPERATURE: 97 F | WEIGHT: 238.63 LBS | RESPIRATION RATE: 16 BRPM | DIASTOLIC BLOOD PRESSURE: 86 MMHG | SYSTOLIC BLOOD PRESSURE: 136 MMHG | BODY MASS INDEX: 40.74 KG/M2

## 2021-08-31 DIAGNOSIS — F33.42 RECURRENT MAJOR DEPRESSIVE DISORDER, IN FULL REMISSION (HCC): ICD-10-CM

## 2021-08-31 DIAGNOSIS — Z87.19 HISTORY OF GASTRITIS: ICD-10-CM

## 2021-08-31 DIAGNOSIS — G47.33 OBSTRUCTIVE SLEEP APNEA SYNDROME: ICD-10-CM

## 2021-08-31 DIAGNOSIS — E11.9 NEW ONSET TYPE 2 DIABETES MELLITUS (HCC): ICD-10-CM

## 2021-08-31 DIAGNOSIS — E78.2 MIXED DYSLIPIDEMIA: ICD-10-CM

## 2021-08-31 DIAGNOSIS — F41.1 GENERALIZED ANXIETY DISORDER: ICD-10-CM

## 2021-08-31 DIAGNOSIS — Z01.818 PRE-OP TESTING: Primary | ICD-10-CM

## 2021-08-31 DIAGNOSIS — G89.29 CHRONIC KNEE PAIN AFTER TOTAL REPLACEMENT OF RIGHT KNEE JOINT: ICD-10-CM

## 2021-08-31 DIAGNOSIS — Z86.718 HISTORY OF DVT OF LOWER EXTREMITY: ICD-10-CM

## 2021-08-31 DIAGNOSIS — Z96.651 CHRONIC KNEE PAIN AFTER TOTAL REPLACEMENT OF RIGHT KNEE JOINT: ICD-10-CM

## 2021-08-31 DIAGNOSIS — M79.2 INTRACTABLE NEUROPATHIC PAIN OF KNEE, RIGHT: ICD-10-CM

## 2021-08-31 DIAGNOSIS — J31.0 NONALLERGIC RHINITIS: ICD-10-CM

## 2021-08-31 DIAGNOSIS — M54.16 LUMBAR RADICULITIS: ICD-10-CM

## 2021-08-31 DIAGNOSIS — E03.9 ACQUIRED HYPOTHYROIDISM: ICD-10-CM

## 2021-08-31 DIAGNOSIS — E66.01 OBESITY, MORBID, BMI 40.0-49.9 (HCC): ICD-10-CM

## 2021-08-31 DIAGNOSIS — G43.709 CHRONIC MIGRAINE WITHOUT AURA WITHOUT STATUS MIGRAINOSUS, NOT INTRACTABLE: ICD-10-CM

## 2021-08-31 DIAGNOSIS — G56.02 LEFT CARPAL TUNNEL SYNDROME: ICD-10-CM

## 2021-08-31 DIAGNOSIS — I10 BENIGN ESSENTIAL HTN: ICD-10-CM

## 2021-08-31 DIAGNOSIS — M25.561 CHRONIC KNEE PAIN AFTER TOTAL REPLACEMENT OF RIGHT KNEE JOINT: ICD-10-CM

## 2021-08-31 DIAGNOSIS — M48.062 SPINAL STENOSIS OF LUMBAR REGION WITH NEUROGENIC CLAUDICATION: ICD-10-CM

## 2021-08-31 LAB
ANION GAP SERPL CALC-SCNC: 12 MMOL/L (ref 0–18)
BASOPHILS # BLD AUTO: 0.06 X10(3) UL (ref 0–0.2)
BASOPHILS NFR BLD AUTO: 0.6 %
BUN BLD-MCNC: 12 MG/DL (ref 7–18)
CALCIUM BLD-MCNC: 10.1 MG/DL (ref 8.5–10.1)
CHLORIDE SERPL-SCNC: 101 MMOL/L (ref 98–112)
CO2 SERPL-SCNC: 22 MMOL/L (ref 21–32)
CREAT BLD-MCNC: 0.79 MG/DL
EOSINOPHIL # BLD AUTO: 0.1 X10(3) UL (ref 0–0.7)
EOSINOPHIL NFR BLD AUTO: 1 %
ERYTHROCYTE [DISTWIDTH] IN BLOOD BY AUTOMATED COUNT: 15.1 %
GLUCOSE BLD-MCNC: 82 MG/DL (ref 70–99)
HCT VFR BLD AUTO: 40.9 %
HGB BLD-MCNC: 12.9 G/DL
IMM GRANULOCYTES # BLD AUTO: 0.07 X10(3) UL (ref 0–1)
IMM GRANULOCYTES NFR BLD: 0.7 %
LYMPHOCYTES # BLD AUTO: 2.63 X10(3) UL (ref 1–4)
LYMPHOCYTES NFR BLD AUTO: 27.2 %
MCH RBC QN AUTO: 26.6 PG (ref 26–34)
MCHC RBC AUTO-ENTMCNC: 31.5 G/DL (ref 31–37)
MCV RBC AUTO: 84.3 FL
MONOCYTES # BLD AUTO: 0.83 X10(3) UL (ref 0.1–1)
MONOCYTES NFR BLD AUTO: 8.6 %
NEUTROPHILS # BLD AUTO: 5.98 X10 (3) UL (ref 1.5–7.7)
NEUTROPHILS # BLD AUTO: 5.98 X10(3) UL (ref 1.5–7.7)
NEUTROPHILS NFR BLD AUTO: 61.9 %
OSMOLALITY SERPL CALC.SUM OF ELEC: 279 MOSM/KG (ref 275–295)
PATIENT FASTING Y/N/NP: NO
PLATELET # BLD AUTO: 368 10(3)UL (ref 150–450)
POTASSIUM SERPL-SCNC: 4.5 MMOL/L (ref 3.5–5.1)
RBC # BLD AUTO: 4.85 X10(6)UL
SODIUM SERPL-SCNC: 135 MMOL/L (ref 136–145)
WBC # BLD AUTO: 9.7 X10(3) UL (ref 4–11)

## 2021-08-31 PROCEDURE — 99214 OFFICE O/P EST MOD 30 MIN: CPT | Performed by: FAMILY MEDICINE

## 2021-08-31 PROCEDURE — 36415 COLL VENOUS BLD VENIPUNCTURE: CPT | Performed by: FAMILY MEDICINE

## 2021-08-31 PROCEDURE — 80048 BASIC METABOLIC PNL TOTAL CA: CPT | Performed by: FAMILY MEDICINE

## 2021-08-31 PROCEDURE — 85025 COMPLETE CBC W/AUTO DIFF WBC: CPT | Performed by: FAMILY MEDICINE

## 2021-08-31 PROCEDURE — 93000 ELECTROCARDIOGRAM COMPLETE: CPT | Performed by: FAMILY MEDICINE

## 2021-08-31 RX ORDER — DIVALPROEX SODIUM 500 MG/1
TABLET, EXTENDED RELEASE ORAL
Qty: 180 TABLET | Refills: 0 | OUTPATIENT
Start: 2021-08-31

## 2021-08-31 RX ORDER — BUPROPION HYDROCHLORIDE 150 MG/1
300 TABLET ORAL DAILY
Qty: 90 TABLET | Refills: 2 | Status: SHIPPED | OUTPATIENT
Start: 2021-08-31 | End: 2021-11-23

## 2021-08-31 RX ORDER — BUPROPION HYDROCHLORIDE 300 MG/1
300 TABLET ORAL EVERY MORNING
Qty: 90 TABLET | Refills: 2 | Status: SHIPPED | OUTPATIENT
Start: 2021-08-31

## 2021-08-31 RX ORDER — BUPROPION HYDROCHLORIDE 300 MG/1
300 TABLET ORAL EVERY MORNING
Refills: 0 | Status: CANCELLED | OUTPATIENT
Start: 2021-08-31

## 2021-08-31 NOTE — PROGRESS NOTES
CC: Preop exam.    HPI:   Vini Romero is a 79year old female who presents for a pre-operative physical exam requested  By    . Patient is to have  Vertiflex, secondary to chronic back pain to be on be determined at [address] under conscio Oral Tablet 24 Hr Take 1 tablet (750 mg total) by mouth 2 (two) times daily with meals. 180 tablet 1   • Omeprazole 40 MG Oral Capsule Delayed Release TAKE 1 CAPSULE BY MOUTH EVERY MORNING AFTER SYNTHROID 90 capsule 1   • Dulaglutide (TRULICITY) 7.47 MG/0. Does not apply Misc Use as directed   0   • ClonazePAM 0.5 MG Oral Tab TK SS T PO BID UTD  0   • Cranberry 125 MG Oral Tab Take by mouth. • Calcium Carbonate-Vitamin D (OS-SHELBY 500 + D OR) Take 1 tablet by mouth 2 (two) times daily.      • Probiotic Prod pain, left    • Sleep apnea    • Thyroid condition    • Unspecified essential hypertension    • Unspecified sleep apnea 2008    uses Cpap      Past Surgical History:   Procedure Laterality Date   • APPENDECTOMY  1964   • CHOLECYSTECTOMY  1993   • Gonzalo Saldana asthma    EXAM:   /86 (BP Location: Right arm, Patient Position: Sitting, Cuff Size: large)   Pulse 111   Temp 97.3 °F (36.3 °C) (Temporal)   Resp 16   Ht 5' 4\" (1.626 m)   Wt 238 lb 9.6 oz (108.2 kg)   LMP  (LMP Unknown)   SpO2 96%   BMI 40.96 kg/m 0.07    • Neutrophil % 08/31/2021 61.9    • Lymphocyte % 08/31/2021 27.2    • Monocyte % 08/31/2021 8.6    • Eosinophil % 08/31/2021 1.0    • Basophil % 08/31/2021 0.6    • Immature Granulocyte % 08/31/2021 0.7      ECG: Normal sinus rhythm  VR= 99 bpm  OK

## 2021-08-31 NOTE — TELEPHONE ENCOUNTER
Refill request denied as it is too soon for refill and patient needs to be seen in clinic for future refills.

## 2021-09-01 DIAGNOSIS — G43.709 CHRONIC MIGRAINE WITHOUT AURA WITHOUT STATUS MIGRAINOSUS, NOT INTRACTABLE: ICD-10-CM

## 2021-09-01 RX ORDER — DIVALPROEX SODIUM 500 MG/1
TABLET, EXTENDED RELEASE ORAL
Qty: 60 TABLET | Refills: 0 | Status: SHIPPED | OUTPATIENT
Start: 2021-09-01 | End: 2021-11-15

## 2021-09-01 NOTE — TELEPHONE ENCOUNTER
Called patient and advised that she would need to make an appt. Patient schedule an appt for 09/24/2021. Phone call was transferred to Akua Holiday.        Medication: DIVALPROEX SODIUM  MG Oral Tablet 24 Hr    Date of last refill: 07/10/2021 (#180/0)  Date l

## 2021-09-02 ENCOUNTER — TELEPHONE (OUTPATIENT)
Dept: FAMILY MEDICINE CLINIC | Facility: CLINIC | Age: 71
End: 2021-09-02

## 2021-09-02 NOTE — TELEPHONE ENCOUNTER
----- Message from Jim Shaffer DO sent at 9/1/2021  4:41 PM CDT -----  Results reviewed. Released to [address] Test show no significant abnormality. Please let me know if you have any questions. Misha Gant,  I have reviewed your test results.   Tests show

## 2021-09-02 NOTE — TELEPHONE ENCOUNTER
Spoke to pt and let them know results and recommendation per Dr Pravin Cowart. Patient voiced understanding.

## 2021-09-10 ENCOUNTER — TELEPHONE (OUTPATIENT)
Dept: FAMILY MEDICINE CLINIC | Facility: CLINIC | Age: 71
End: 2021-09-10

## 2021-09-10 NOTE — TELEPHONE ENCOUNTER
History and Physical exam with EKG and labs faxed at [phone].      ATT: Dr sImael Mauricio # [phone]  Fax # [phone]

## 2021-09-16 NOTE — TELEPHONE ENCOUNTER
Patient notified of results and instructions  Patient verbalizes understanding. coaptite implant  2 syringes used  LOT: [id number]  Expiration: 04/28/2023

## 2021-10-10 DIAGNOSIS — E03.9 ACQUIRED HYPOTHYROIDISM: ICD-10-CM

## 2021-10-11 RX ORDER — LEVOTHYROXINE SODIUM 88 UG/1
TABLET ORAL
Qty: 90 TABLET | Refills: 0 | Status: SHIPPED | OUTPATIENT
Start: 2021-10-11 | End: 2021-12-09

## 2021-10-11 NOTE — TELEPHONE ENCOUNTER
Refill Passed Protocol:     Pt requesting refill of   Requested Prescriptions     Pending Prescriptions Disp Refills   • LEVOTHYROXINE 88 MCG Oral Tab [Pharmacy Med Name: LEVOTHYROXINE 88 MCG TABLET] 90 tablet 0     Sig: TAKE 1 TABLET BY MOUTH BEFORE BREAK

## 2021-10-14 ENCOUNTER — TELEPHONE (OUTPATIENT)
Dept: FAMILY MEDICINE CLINIC | Facility: CLINIC | Age: 71
End: 2021-10-14

## 2021-10-14 NOTE — TELEPHONE ENCOUNTER
Spoke to pt, pt with c/o of having restless legs usually 20 minutes after going to bed. Has tied elevating legs and repositioning or walking. Has also tried topical OTC but does not obtain relief. Affecting pts sleep.     Spoke to [place], spine surgeon

## 2021-10-14 NOTE — TELEPHONE ENCOUNTER
Pt called office asking if she can get medication for her restless legs. Pt had back surgery 3 weeks ago and has been having this issue every night.

## 2021-10-15 NOTE — TELEPHONE ENCOUNTER
Spoke to pt and let them know message and recommendation per Dr Garcia Fulton. Patient voiced understanding.  Video visit scheduled 10/18/21

## 2021-10-18 ENCOUNTER — TELEMEDICINE (OUTPATIENT)
Dept: FAMILY MEDICINE CLINIC | Facility: CLINIC | Age: 71
End: 2021-10-18
Payer: MEDICARE

## 2021-10-18 VITALS — HEIGHT: 64 IN | WEIGHT: 238 LBS | BODY MASS INDEX: 40.63 KG/M2 | RESPIRATION RATE: 16 BRPM

## 2021-10-18 DIAGNOSIS — G25.81 RLS (RESTLESS LEGS SYNDROME): Primary | ICD-10-CM

## 2021-10-18 DIAGNOSIS — E11.9 NEW ONSET TYPE 2 DIABETES MELLITUS (HCC): ICD-10-CM

## 2021-10-18 PROCEDURE — 99213 OFFICE O/P EST LOW 20 MIN: CPT | Performed by: FAMILY MEDICINE

## 2021-10-18 RX ORDER — GABAPENTIN 300 MG/1
300 CAPSULE ORAL NIGHTLY
Qty: 30 CAPSULE | Refills: 1 | Status: SHIPPED | OUTPATIENT
Start: 2021-10-18 | End: 2021-12-20

## 2021-10-18 NOTE — PROGRESS NOTES
Due to COVID-19 ACTION PLAN, the patient's office visit was converted to a video visit.   Time Spent: 16 mins +5 minutes writing note    Patient presents with:  Leg Pain: restless leg syndrome    Subjective     HPI:   Robin Farias is a 79year old fem tablet (750 mg total) by mouth 2 (two) times daily with meals. , Disp: 180 tablet, Rfl: 1  Omeprazole 40 MG Oral Capsule Delayed Release, TAKE 1 CAPSULE BY MOUTH EVERY MORNING AFTER SYNTHROID, Disp: 90 capsule, Rfl: 1  Dulaglutide (TRULICITY) 1.31 NA/3.3CX Probiotic Product (PROBIOTIC OR), Take 1 capsule by mouth every morning. Indications: supplement, Disp: , Rfl:   Cholecalciferol (VITAMIN D3) 1000 UNITS Oral Cap, Take 2,000 Units by mouth daily.   , Disp: , Rfl:   HYDROcodone-acetaminophen ([address]) Pantera Dean notified to call with any questions, complications, allergies, or worsening or changing symptoms. Patient is to call with any side effects or complications from the treatments as a result of today.      Please note that the following visit was completed us

## 2021-10-21 ENCOUNTER — TELEPHONE (OUTPATIENT)
Dept: FAMILY MEDICINE CLINIC | Facility: CLINIC | Age: 71
End: 2021-10-21

## 2021-10-21 RX ORDER — BLOOD SUGAR DIAGNOSTIC
1 STRIP MISCELLANEOUS 2 TIMES DAILY
Qty: 200 STRIP | Refills: 0 | Status: SHIPPED | OUTPATIENT
Start: 2021-10-21 | End: 2021-12-09

## 2021-10-21 NOTE — TELEPHONE ENCOUNTER
Pt called office asking to speak to a nurse in regards to her test strips. Pt sates due to her insurance the pharmacy is requiring a code per insurance.

## 2021-10-21 NOTE — TELEPHONE ENCOUNTER
Refill Passed Protocol:     Pt requesting refill of   Requested Prescriptions     Pending Prescriptions Disp Refills   • Glucose Blood (ACCU-CHEK GUIDE) In Vitro Strip 200 strip 0     Si strip by In Vitro route 2 (two) times daily.            Refill was

## 2021-10-21 NOTE — TELEPHONE ENCOUNTER
Spoke to pt pharmacy requesting ICD 10 code for test strips   Rx order sent to pharmacy with ICD 10 code E11.9

## 2021-11-03 DIAGNOSIS — E78.2 MIXED DYSLIPIDEMIA: ICD-10-CM

## 2021-11-03 RX ORDER — ATORVASTATIN CALCIUM 40 MG/1
40 TABLET, FILM COATED ORAL NIGHTLY
Qty: 90 TABLET | Refills: 0 | Status: SHIPPED | OUTPATIENT
Start: 2021-11-03 | End: 2021-12-09

## 2021-11-03 NOTE — TELEPHONE ENCOUNTER
Refill Passed Protocol:     Pt requesting refill of  Requested Prescriptions     Pending Prescriptions Disp Refills   • ATORVASTATIN 40 MG Oral Tab [Pharmacy Med Name: ATORVASTATIN 40 MG TABLET] 90 tablet 0     Sig: TAKE 1 TABLET (40 MG TOTAL) BY MOUTH NIG

## 2021-11-12 ENCOUNTER — TELEPHONE (OUTPATIENT)
Dept: FAMILY MEDICINE CLINIC | Facility: CLINIC | Age: 71
End: 2021-11-12

## 2021-11-12 NOTE — TELEPHONE ENCOUNTER
[employer] [facility] forms completed by  09/15/21.  For Trulicity    Faxed to provided fax [phone]  Confirmation received   Sent to scan

## 2021-11-15 ENCOUNTER — TELEMEDICINE (OUTPATIENT)
Dept: FAMILY MEDICINE CLINIC | Facility: CLINIC | Age: 71
End: 2021-11-15
Payer: MEDICARE

## 2021-11-15 DIAGNOSIS — J06.9 URI WITH COUGH AND CONGESTION: Primary | ICD-10-CM

## 2021-11-15 DIAGNOSIS — G43.709 CHRONIC MIGRAINE WITHOUT AURA WITHOUT STATUS MIGRAINOSUS, NOT INTRACTABLE: ICD-10-CM

## 2021-11-15 PROCEDURE — 99214 OFFICE O/P EST MOD 30 MIN: CPT | Performed by: FAMILY MEDICINE

## 2021-11-15 RX ORDER — DIVALPROEX SODIUM 500 MG/1
TABLET, EXTENDED RELEASE ORAL
Qty: 60 TABLET | Refills: 0 | Status: SHIPPED | OUTPATIENT
Start: 2021-11-15 | End: 2021-12-08

## 2021-11-15 RX ORDER — BENZONATATE 100 MG/1
100 CAPSULE ORAL 3 TIMES DAILY PRN
Qty: 30 CAPSULE | Refills: 0 | Status: SHIPPED | OUTPATIENT
Start: 2021-11-15 | End: 2021-12-02

## 2021-11-15 NOTE — TELEPHONE ENCOUNTER
Medication: DIVALPROEX SODIUM  MG Oral Tablet 24 Hr     Date of last refill: 9/1/21 (#60/0)  [place]     Last office visit: 4/15/20  Due back to clinic per last office note:  rtc in clinic 12 months  Date next

## 2021-11-15 NOTE — PROGRESS NOTES
Video Visit    This visit is conducted using Telemedicine with live, interactive video and audio. Juan Parsons  verbally consents to a Video visit.     Patient understands and accepts financial responsibility for any deductible, co-insurance and/or severe, advised to go to ED. Pt understands and agrees with plan. - start Benzonatate TID prn, R/B/SEs of new med d/w pt  - if gets testing outside of [address], advised to send us the results      Return if symptoms worsen or fail to improve.       Rachel Yee

## 2021-11-16 ENCOUNTER — TELEPHONE (OUTPATIENT)
Dept: FAMILY MEDICINE CLINIC | Facility: CLINIC | Age: 71
End: 2021-11-16

## 2021-11-16 DIAGNOSIS — J02.9 ACUTE PHARYNGITIS, UNSPECIFIED ETIOLOGY: Primary | ICD-10-CM

## 2021-11-16 RX ORDER — AMOXICILLIN 500 MG/1
500 CAPSULE ORAL 2 TIMES DAILY
Qty: 20 CAPSULE | Refills: 0 | Status: SHIPPED | OUTPATIENT
Start: 2021-11-16 | End: 2021-11-23

## 2021-11-16 NOTE — TELEPHONE ENCOUNTER
Spoke to pt   She almost has no voice  Pt states she will  antibiotic and complete covid test. Will continue flonase nasal spray as well, and call with any new or worsening symptoms  JOSE D

## 2021-11-16 NOTE — TELEPHONE ENCOUNTER
She can either go IC for Rapid strep test or I can get her started on antibiotic for possible strep throat. However, her sore throat may be related to the post nasal drip- either way, please continue the FLuticasone/Flonase nasal spray.       Rx sent, just

## 2021-11-23 ENCOUNTER — INITIAL APN SNF VISIT (OUTPATIENT)
Dept: INTERNAL MEDICINE CLINIC | Age: 71
End: 2021-11-23

## 2021-11-23 ENCOUNTER — EXTERNAL FACILITY (OUTPATIENT)
Dept: FAMILY MEDICINE CLINIC | Facility: CLINIC | Age: 71
End: 2021-11-23

## 2021-11-23 VITALS
HEART RATE: 95 BPM | RESPIRATION RATE: 19 BRPM | OXYGEN SATURATION: 94 % | DIASTOLIC BLOOD PRESSURE: 94 MMHG | SYSTOLIC BLOOD PRESSURE: 152 MMHG | TEMPERATURE: 98 F

## 2021-11-23 VITALS
HEART RATE: 95 BPM | SYSTOLIC BLOOD PRESSURE: 152 MMHG | TEMPERATURE: 98 F | OXYGEN SATURATION: 94 % | RESPIRATION RATE: 19 BRPM | DIASTOLIC BLOOD PRESSURE: 94 MMHG

## 2021-11-23 DIAGNOSIS — M25.561 CHRONIC KNEE PAIN AFTER TOTAL REPLACEMENT OF RIGHT KNEE JOINT: ICD-10-CM

## 2021-11-23 DIAGNOSIS — Z86.718 HISTORY OF DVT OF LOWER EXTREMITY: ICD-10-CM

## 2021-11-23 DIAGNOSIS — M48.062 SPINAL STENOSIS OF LUMBAR REGION WITH NEUROGENIC CLAUDICATION: ICD-10-CM

## 2021-11-23 DIAGNOSIS — E03.9 ACQUIRED HYPOTHYROIDISM: ICD-10-CM

## 2021-11-23 DIAGNOSIS — F33.42 RECURRENT MAJOR DEPRESSIVE DISORDER, IN FULL REMISSION (HCC): ICD-10-CM

## 2021-11-23 DIAGNOSIS — E78.2 MIXED DYSLIPIDEMIA: ICD-10-CM

## 2021-11-23 DIAGNOSIS — Z96.651 CHRONIC KNEE PAIN AFTER TOTAL REPLACEMENT OF RIGHT KNEE JOINT: ICD-10-CM

## 2021-11-23 DIAGNOSIS — G47.33 OBSTRUCTIVE SLEEP APNEA SYNDROME: ICD-10-CM

## 2021-11-23 DIAGNOSIS — F41.1 GENERALIZED ANXIETY DISORDER: ICD-10-CM

## 2021-11-23 DIAGNOSIS — J31.0 NON-ALLERGIC RHINITIS: ICD-10-CM

## 2021-11-23 DIAGNOSIS — J96.01 ACUTE RESPIRATORY FAILURE WITH HYPOXIA (HCC): Primary | ICD-10-CM

## 2021-11-23 DIAGNOSIS — Z78.9 IMPAIRED MOBILITY AND ADLS: ICD-10-CM

## 2021-11-23 DIAGNOSIS — R53.81 PHYSICAL DECONDITIONING: ICD-10-CM

## 2021-11-23 DIAGNOSIS — G43.709 CHRONIC MIGRAINE WITHOUT AURA WITHOUT STATUS MIGRAINOSUS, NOT INTRACTABLE: ICD-10-CM

## 2021-11-23 DIAGNOSIS — M79.2 INTRACTABLE NEUROPATHIC PAIN OF KNEE, RIGHT: ICD-10-CM

## 2021-11-23 DIAGNOSIS — I10 BENIGN ESSENTIAL HTN: ICD-10-CM

## 2021-11-23 DIAGNOSIS — Z74.09 IMPAIRED MOBILITY AND ADLS: ICD-10-CM

## 2021-11-23 DIAGNOSIS — B34.8 PARAINFLUENZA INFECTION: ICD-10-CM

## 2021-11-23 DIAGNOSIS — G89.29 CHRONIC KNEE PAIN AFTER TOTAL REPLACEMENT OF RIGHT KNEE JOINT: ICD-10-CM

## 2021-11-23 DIAGNOSIS — R73.03 PREDIABETES: Primary | ICD-10-CM

## 2021-11-23 DIAGNOSIS — J69.0 ASPIRATION PNEUMONIA OF BOTH LUNGS, UNSPECIFIED ASPIRATION PNEUMONIA TYPE, UNSPECIFIED PART OF LUNG (HCC): ICD-10-CM

## 2021-11-23 DIAGNOSIS — E11.9 NEW ONSET TYPE 2 DIABETES MELLITUS (HCC): ICD-10-CM

## 2021-11-23 PROCEDURE — 99310 SBSQ NF CARE HIGH MDM 45: CPT | Performed by: NURSE PRACTITIONER

## 2021-11-23 RX ORDER — METFORMIN HYDROCHLORIDE 500 MG/1
1 TABLET, FILM COATED, EXTENDED RELEASE ORAL
COMMUNITY
End: 2021-12-09

## 2021-11-23 RX ORDER — CEFUROXIME AXETIL 500 MG/1
500 TABLET ORAL 2 TIMES DAILY
COMMUNITY
Start: 2021-11-22 | End: 2021-11-27

## 2021-11-23 RX ORDER — OMEPRAZOLE 20 MG/1
20 CAPSULE, DELAYED RELEASE ORAL
COMMUNITY

## 2021-11-23 NOTE — PROGRESS NOTES
Samanta Parsons  : 1950  Age 70year old  female patient is admitted to Facility:  [facility] for  [facility] Admit date:   21  Discharge date to [facility]:   21  ELOS:   12-14 days  Anticipated discharge date:   15 calf pain or leg swelling.    • Acute recurrent ethmoidal sinusitis 5/31/2019   • Allergic rhinitis Tested but none specific   • Anxiety 1964   • Arthritis    • Bursitis, shoulder, left 3/24/2017   • Depression 1986   • Diabetes ([facility] 1995   • DVT (deep domenica Disorder Maternal Uncle      Social History    Tobacco Use      Smoking status: Never Smoker      Smokeless tobacco: Never Used    Vaping Use      Vaping Use: Never used    Alcohol use: No      Alcohol/week: 0.0 standard drinks    Drug use: No      ALLERGI 2 SPRAYS INTO EACH NOSTRIL EVERY DAY 48 mL 1   • Montelukast Sodium 10 MG Oral Tab Take 1 tablet (10 mg total) by mouth daily.  90 tablet 3   • Aspirin-Acetaminophen-Caffeine (EXCEDRIN EXTRA STRENGTH OR) Take 2 tablets by mouth every 6 (six) hours as needed seizures, denies vertigo, denies tinnitus and denies tremors  PSYCHE: ---on tx for anxiety and depression, mood stable  HEMATOLOGY:denies hx anemia, denies bruising, denies excessive bleeding  ENDOCRINE: denies excessive thirst or urination; denies unexpec Pertinent medical history:   Belkis Espinal all that apply with \"x\"  [  ]  Heart failure/pulmonary edema                      [  ]  Cardiac surgery                      [ x ]  Sepsis  [  ]  COPD/Pneumonia                                          [  ]  Angina DC on or before 12.5.21; SW to assist w/ DC planning  · INTERMEDIATE risk for readmission    Acute hypoxic respiratory failure/Parainfluenza infection/Aspiration PNA  · O2 sat q shift  · Swallowing precautions  · ST eval and tx  · IS QID  · Change Tessalon JULIEN  Future Appointments   Date Time Provider Seb Valentina   11/29/2021  1:00 PM Prakash Ray MD [facility]  [facility]   12/20/2021  8:15 AM DO AMIEE Lerma [facility]           *Greater than 65 minutes spent w/ patient and family,

## 2021-11-24 ENCOUNTER — TELEPHONE (OUTPATIENT)
Dept: FAMILY MEDICINE CLINIC | Facility: CLINIC | Age: 71
End: 2021-11-24

## 2021-11-24 NOTE — TELEPHONE ENCOUNTER
Patient needs hospital follow-up next week TCM visit. Please call. Also please call [facility] to fax discharge summary. No information is listed in Care Everywhere. It is very scant. Thank you    Message below received in outside messages.   Patient:

## 2021-11-26 NOTE — TELEPHONE ENCOUNTER
Spoke to pt.  She almost has no voice and can only talk in whisper difficult to understand  She handed phone to other person  The other person was a speech therapist at [facility] center who happened to be in pt's room  Pt is currently at this facility wi

## 2021-11-28 ENCOUNTER — EXTERNAL FACILITY (OUTPATIENT)
Dept: FAMILY MEDICINE CLINIC | Facility: CLINIC | Age: 71
End: 2021-11-28

## 2021-11-28 DIAGNOSIS — T84.84XD PAIN DUE TO TOTAL RIGHT KNEE REPLACEMENT, SUBSEQUENT ENCOUNTER: ICD-10-CM

## 2021-11-28 DIAGNOSIS — E11.9 NEW ONSET TYPE 2 DIABETES MELLITUS (HCC): ICD-10-CM

## 2021-11-28 DIAGNOSIS — Z86.718 HISTORY OF DVT OF LOWER EXTREMITY: ICD-10-CM

## 2021-11-28 DIAGNOSIS — A41.9 SEPSIS, DUE TO UNSPECIFIED ORGANISM, UNSPECIFIED WHETHER ACUTE ORGAN DYSFUNCTION PRESENT (HCC): Primary | ICD-10-CM

## 2021-11-28 DIAGNOSIS — E66.01 MORBID OBESITY WITH BMI OF 40.0-44.9, ADULT (HCC): ICD-10-CM

## 2021-11-28 DIAGNOSIS — G47.33 OBSTRUCTIVE SLEEP APNEA SYNDROME: ICD-10-CM

## 2021-11-28 DIAGNOSIS — M94.251 CHONDROMALACIA OF RIGHT HIP: ICD-10-CM

## 2021-11-28 DIAGNOSIS — F33.42 RECURRENT MAJOR DEPRESSIVE DISORDER, IN FULL REMISSION (HCC): ICD-10-CM

## 2021-11-28 DIAGNOSIS — G43.709 CHRONIC MIGRAINE WITHOUT AURA WITHOUT STATUS MIGRAINOSUS, NOT INTRACTABLE: ICD-10-CM

## 2021-11-28 DIAGNOSIS — F41.1 GENERALIZED ANXIETY DISORDER: ICD-10-CM

## 2021-11-28 DIAGNOSIS — M48.062 SPINAL STENOSIS OF LUMBAR REGION WITH NEUROGENIC CLAUDICATION: ICD-10-CM

## 2021-11-28 DIAGNOSIS — Z96.651 PAIN DUE TO TOTAL RIGHT KNEE REPLACEMENT, SUBSEQUENT ENCOUNTER: ICD-10-CM

## 2021-11-28 DIAGNOSIS — M17.12 PRIMARY OSTEOARTHRITIS OF LEFT KNEE: ICD-10-CM

## 2021-11-28 DIAGNOSIS — G89.29 CHRONIC PAIN OF LEFT KNEE: ICD-10-CM

## 2021-11-28 DIAGNOSIS — J69.0 ASPIRATION PNEUMONIA OF BOTH LUNGS, UNSPECIFIED ASPIRATION PNEUMONIA TYPE, UNSPECIFIED PART OF LUNG (HCC): ICD-10-CM

## 2021-11-28 DIAGNOSIS — E78.2 MIXED DYSLIPIDEMIA: ICD-10-CM

## 2021-11-28 DIAGNOSIS — M54.16 LUMBAR RADICULITIS: ICD-10-CM

## 2021-11-28 DIAGNOSIS — I10 BENIGN ESSENTIAL HTN: ICD-10-CM

## 2021-11-28 DIAGNOSIS — M79.2 INTRACTABLE NEUROPATHIC PAIN OF KNEE, RIGHT: ICD-10-CM

## 2021-11-28 DIAGNOSIS — E03.9 ACQUIRED HYPOTHYROIDISM: ICD-10-CM

## 2021-11-28 DIAGNOSIS — M25.562 CHRONIC PAIN OF LEFT KNEE: ICD-10-CM

## 2021-11-28 PROCEDURE — 99306 1ST NF CARE HIGH MDM 50: CPT | Performed by: FAMILY MEDICINE

## 2021-11-29 PROBLEM — J69.0 ASPIRATION PNEUMONIA OF BOTH LUNGS (HCC): Status: ACTIVE | Noted: 2021-11-29

## 2021-11-29 NOTE — PROGRESS NOTES
[address] [facility] [place] Author: Magda Leo MD     1950 MRN [id number]   [facility]  Admission 19      Last Hospital Discharge 19 PCP Mata De Leon, [address] [facility] of Discharge 19           HPI:    Kelle Askew once daily. DIVALPROEX SODIUM  MG Oral Tablet 24 Hr, TAKE 1 TABLET BY MOUTH TWICE A DAY  benzonatate 100 MG Oral Cap, Take 1 capsule (100 mg total) by mouth 3 (three) times daily as needed for cough.   ATORVASTATIN 40 MG Oral Tab, TAKE 1 TABLET (40 M current facility-administered medications on file prior to visit.         HISTORY:  She  has a past medical history of Acute deep vein thrombosis (DVT) of tibial vein of left lower extremity ([address] (10/10/2018), Acute deep vein thrombosis (DVT) of tibial vei index is 39.48 kg/m² as calculated from the following:    Height as of 10/22/21: 5' 4\" (1.626 m). Weight as of 10/22/21: 230 lb (104.3 kg). Physical Exam  Vitals and nursing note reviewed.    Constitutional:       General: She is not in acute distre Reflexes are normal and symmetric. Reflexes normal.   Psychiatric:         Behavior: Behavior normal.         Thought Content:  Thought content normal.         Judgment: Judgment normal.             Medication Reviewed: in Epic and [facility] unspecified aspiration pneumonia type, unspecified part of lung ([facility]  Acquired hypothyroidism  Benign essential HTN  Chondromalacia of right hip  Chronic migraine without aura without status migrainosus, not intractable  Chronic pain of left knee  General

## 2021-11-30 ENCOUNTER — SNF VISIT (OUTPATIENT)
Dept: INTERNAL MEDICINE CLINIC | Age: 71
End: 2021-11-30

## 2021-11-30 VITALS
TEMPERATURE: 97 F | OXYGEN SATURATION: 94 % | SYSTOLIC BLOOD PRESSURE: 147 MMHG | HEART RATE: 98 BPM | RESPIRATION RATE: 18 BRPM | DIASTOLIC BLOOD PRESSURE: 81 MMHG

## 2021-11-30 DIAGNOSIS — B34.8 PARAINFLUENZA INFECTION: ICD-10-CM

## 2021-11-30 DIAGNOSIS — E03.9 ACQUIRED HYPOTHYROIDISM: ICD-10-CM

## 2021-11-30 DIAGNOSIS — H92.03 EAR PAIN, BILATERAL: ICD-10-CM

## 2021-11-30 DIAGNOSIS — J69.0 ASPIRATION PNEUMONIA OF BOTH LUNGS, UNSPECIFIED ASPIRATION PNEUMONIA TYPE, UNSPECIFIED PART OF LUNG (HCC): ICD-10-CM

## 2021-11-30 DIAGNOSIS — J96.01 ACUTE RESPIRATORY FAILURE WITH HYPOXIA (HCC): Primary | ICD-10-CM

## 2021-11-30 PROCEDURE — 99309 SBSQ NF CARE MODERATE MDM 30: CPT | Performed by: NURSE PRACTITIONER

## 2021-11-30 RX ORDER — GUAIFENESIN 600 MG
600 TABLET, EXTENDED RELEASE 12 HR ORAL 2 TIMES DAILY
COMMUNITY
Start: 2021-11-30 | End: 2021-12-08

## 2021-11-30 NOTE — PROGRESS NOTES
Juan Parsons, 11/21/1950, 70year old, female    Chief Complaint:  Patient presents with:   Follow - Up: Acute hypoxic respiratory failure/Sepsis/Parainfluenza/Aspiration PNA  Ear Pain: b/l  Lab Results       Subjective:   PMH significant for Migraine R/T recent hospitalization/diagnoses/sequelae; will undergo therapies to rehab and improve strength, endurance and independence w/ ADLs  EXTREMITIES/VASCULAR:no cyanosis, clubbing or edema, radial pulses 2+ and dorsalis pedal pulses 2+  NEUROLOGIC: intact; Sundays  · Check A1C      Hypothyroidism  · Per pt usual LT4 dose at home is 75 mcg; was receiving 88 mcg in hospital and per community PCP notes but admits dose recently changed  · Continue LT4 88 mcg daily  · TFTs in normal range 11.30.21     GERD  · Ome

## 2021-12-02 ENCOUNTER — SNF DISCHARGE (OUTPATIENT)
Dept: INTERNAL MEDICINE CLINIC | Age: 71
End: 2021-12-02

## 2021-12-02 VITALS
DIASTOLIC BLOOD PRESSURE: 56 MMHG | OXYGEN SATURATION: 98 % | RESPIRATION RATE: 19 BRPM | SYSTOLIC BLOOD PRESSURE: 115 MMHG | HEART RATE: 98 BPM | TEMPERATURE: 98 F

## 2021-12-02 DIAGNOSIS — J31.0 NON-ALLERGIC RHINITIS: ICD-10-CM

## 2021-12-02 DIAGNOSIS — G43.709 CHRONIC MIGRAINE WITHOUT AURA WITHOUT STATUS MIGRAINOSUS, NOT INTRACTABLE: ICD-10-CM

## 2021-12-02 DIAGNOSIS — E11.9 NEW ONSET TYPE 2 DIABETES MELLITUS (HCC): ICD-10-CM

## 2021-12-02 DIAGNOSIS — E03.9 ACQUIRED HYPOTHYROIDISM: ICD-10-CM

## 2021-12-02 DIAGNOSIS — M48.062 SPINAL STENOSIS OF LUMBAR REGION WITH NEUROGENIC CLAUDICATION: ICD-10-CM

## 2021-12-02 DIAGNOSIS — G47.33 OBSTRUCTIVE SLEEP APNEA SYNDROME: ICD-10-CM

## 2021-12-02 DIAGNOSIS — Z86.718 HISTORY OF DVT OF LOWER EXTREMITY: ICD-10-CM

## 2021-12-02 DIAGNOSIS — B34.8 PARAINFLUENZA INFECTION: ICD-10-CM

## 2021-12-02 DIAGNOSIS — J96.01 ACUTE RESPIRATORY FAILURE WITH HYPOXIA (HCC): Primary | ICD-10-CM

## 2021-12-02 DIAGNOSIS — M79.2 INTRACTABLE NEUROPATHIC PAIN OF KNEE, RIGHT: ICD-10-CM

## 2021-12-02 DIAGNOSIS — J69.0 ASPIRATION PNEUMONIA OF BOTH LUNGS, UNSPECIFIED ASPIRATION PNEUMONIA TYPE, UNSPECIFIED PART OF LUNG (HCC): ICD-10-CM

## 2021-12-02 DIAGNOSIS — Z78.9 IMPAIRED MOBILITY AND ADLS: ICD-10-CM

## 2021-12-02 DIAGNOSIS — E78.2 MIXED DYSLIPIDEMIA: ICD-10-CM

## 2021-12-02 DIAGNOSIS — F33.42 RECURRENT MAJOR DEPRESSIVE DISORDER, IN FULL REMISSION (HCC): ICD-10-CM

## 2021-12-02 DIAGNOSIS — I10 BENIGN ESSENTIAL HTN: ICD-10-CM

## 2021-12-02 DIAGNOSIS — F41.1 GENERALIZED ANXIETY DISORDER: ICD-10-CM

## 2021-12-02 DIAGNOSIS — Z74.09 IMPAIRED MOBILITY AND ADLS: ICD-10-CM

## 2021-12-02 DIAGNOSIS — R53.81 PHYSICAL DECONDITIONING: ICD-10-CM

## 2021-12-02 PROCEDURE — 99316 NF DSCHRG MGMT 30 MIN+: CPT | Performed by: NURSE PRACTITIONER

## 2021-12-02 NOTE — PROGRESS NOTES
Samanta Parsons, 11/21/1950, 70year old, female is being discharged from Facility:  [address] [facility] SUMMARY    Date of Admission:  11.22.21    Date of Discharge:  Anticipated on or before 12.5.21                        Admitting Diagnos HEALTH: well developed, well nourished, in no apparent distress, +morbidly obese, sitting in bed   LINES, TUBES, DRAINS:  none  SKIN: no rashes, no suspicious lesions, pale, warm, dry  WOUND:  none  EYES: PERRLA, EOMI, sclera anicteric, conjunctiva normal; home  · Fall precautions  · Home health PT/OT/ST eval and tx     Acute hypoxic respiratory failure/Parainfluenza infection/Aspiration PNA  · Swallowing precautions  · ST eval and tx  · IS QID  · DC Tessalon perles 100 mg TID prn  · Cefuroxime axetil 500 mg coordination of care in preparation for discharge.     Franci Mckeon, MELI  12/2/2021  11:00  AM

## 2021-12-03 DIAGNOSIS — G43.709 CHRONIC MIGRAINE WITHOUT AURA WITHOUT STATUS MIGRAINOSUS, NOT INTRACTABLE: ICD-10-CM

## 2021-12-03 RX ORDER — DIVALPROEX SODIUM 500 MG/1
500 TABLET, EXTENDED RELEASE ORAL 2 TIMES DAILY
Qty: 180 TABLET | Refills: 0 | OUTPATIENT
Start: 2021-12-03

## 2021-12-06 ENCOUNTER — TELEPHONE (OUTPATIENT)
Dept: INTERNAL MEDICINE CLINIC | Facility: CLINIC | Age: 71
End: 2021-12-06

## 2021-12-07 ENCOUNTER — TELEPHONE (OUTPATIENT)
Dept: FAMILY MEDICINE CLINIC | Facility: CLINIC | Age: 71
End: 2021-12-07

## 2021-12-07 ENCOUNTER — PATIENT OUTREACH (OUTPATIENT)
Dept: CASE MANAGEMENT | Age: 71
End: 2021-12-07

## 2021-12-07 DIAGNOSIS — Z02.9 ENCOUNTERS FOR UNSPECIFIED ADMINISTRATIVE PURPOSE: ICD-10-CM

## 2021-12-07 PROBLEM — B97.89: Status: ACTIVE | Noted: 2021-11-18

## 2021-12-07 PROBLEM — J96.01 ACUTE RESPIRATORY FAILURE WITH HYPOXIA (HCC): Status: ACTIVE | Noted: 2021-11-18

## 2021-12-07 PROBLEM — J40: Status: ACTIVE | Noted: 2021-11-18

## 2021-12-07 NOTE — TELEPHONE ENCOUNTER
[facility] called to let  know that pt has been admitted to home health care and she will be receiving paperwork to sign off on.

## 2021-12-07 NOTE — PROGRESS NOTES
LM for pt to call [facility] for TCM since discharge. [facility] phone number was provided for pt to call back.

## 2021-12-08 DIAGNOSIS — G43.709 CHRONIC MIGRAINE WITHOUT AURA WITHOUT STATUS MIGRAINOSUS, NOT INTRACTABLE: ICD-10-CM

## 2021-12-08 NOTE — TELEPHONE ENCOUNTER
Pt will not have enough medication until her next visit on 12/20/21. Patient calling to request refill of Divalproex  Pharmacy   [facility]/pharmacy [address] [place] [address], [address] [facility] [address] AT [address] [facility] [place], [phone], [phone]    Patient

## 2021-12-09 ENCOUNTER — OFFICE VISIT (OUTPATIENT)
Dept: FAMILY MEDICINE CLINIC | Facility: CLINIC | Age: 71
End: 2021-12-09
Payer: MEDICARE

## 2021-12-09 VITALS
DIASTOLIC BLOOD PRESSURE: 70 MMHG | TEMPERATURE: 98 F | WEIGHT: 221 LBS | SYSTOLIC BLOOD PRESSURE: 122 MMHG | HEART RATE: 104 BPM | RESPIRATION RATE: 16 BRPM | HEIGHT: 64 IN | BODY MASS INDEX: 37.73 KG/M2 | OXYGEN SATURATION: 96 %

## 2021-12-09 DIAGNOSIS — B97.89: ICD-10-CM

## 2021-12-09 DIAGNOSIS — M25.562 CHRONIC PAIN OF LEFT KNEE: ICD-10-CM

## 2021-12-09 DIAGNOSIS — G89.29 CHRONIC KNEE PAIN AFTER TOTAL REPLACEMENT OF RIGHT KNEE JOINT: ICD-10-CM

## 2021-12-09 DIAGNOSIS — I10 BENIGN ESSENTIAL HTN: ICD-10-CM

## 2021-12-09 DIAGNOSIS — Z96.651 CHRONIC KNEE PAIN AFTER TOTAL REPLACEMENT OF RIGHT KNEE JOINT: ICD-10-CM

## 2021-12-09 DIAGNOSIS — K21.9 GASTRO-ESOPHAGEAL REFLUX DISEASE WITHOUT ESOPHAGITIS: ICD-10-CM

## 2021-12-09 DIAGNOSIS — Z86.19 HISTORY OF SEPSIS: ICD-10-CM

## 2021-12-09 DIAGNOSIS — E78.2 MIXED HYPERLIPIDEMIA: ICD-10-CM

## 2021-12-09 DIAGNOSIS — Z86.010 HISTORY OF COLONOSCOPY WITH POLYPECTOMY: ICD-10-CM

## 2021-12-09 DIAGNOSIS — Z98.890 HISTORY OF COLONOSCOPY WITH POLYPECTOMY: ICD-10-CM

## 2021-12-09 DIAGNOSIS — M48.062 SPINAL STENOSIS OF LUMBAR REGION WITH NEUROGENIC CLAUDICATION: ICD-10-CM

## 2021-12-09 DIAGNOSIS — H91.13 PRESBYCUSIS OF BOTH EARS: ICD-10-CM

## 2021-12-09 DIAGNOSIS — T84.84XD PAIN DUE TO TOTAL RIGHT KNEE REPLACEMENT, SUBSEQUENT ENCOUNTER: ICD-10-CM

## 2021-12-09 DIAGNOSIS — G43.709 CHRONIC MIGRAINE WITHOUT AURA WITHOUT STATUS MIGRAINOSUS, NOT INTRACTABLE: ICD-10-CM

## 2021-12-09 DIAGNOSIS — J31.0 NONALLERGIC RHINITIS: ICD-10-CM

## 2021-12-09 DIAGNOSIS — E11.40 TYPE 2 DIABETES MELLITUS WITH DIABETIC NEUROPATHY, WITH LONG-TERM CURRENT USE OF INSULIN (HCC): ICD-10-CM

## 2021-12-09 DIAGNOSIS — Z79.4 TYPE 2 DIABETES MELLITUS WITH DIABETIC NEUROPATHY, WITH LONG-TERM CURRENT USE OF INSULIN (HCC): ICD-10-CM

## 2021-12-09 DIAGNOSIS — Z86.718 HISTORY OF DVT OF LOWER EXTREMITY: ICD-10-CM

## 2021-12-09 DIAGNOSIS — E03.9 ACQUIRED HYPOTHYROIDISM: ICD-10-CM

## 2021-12-09 DIAGNOSIS — Z87.01 HISTORY OF ASPIRATION PNEUMONIA: ICD-10-CM

## 2021-12-09 DIAGNOSIS — J40: ICD-10-CM

## 2021-12-09 DIAGNOSIS — M54.16 LUMBAR RADICULITIS: ICD-10-CM

## 2021-12-09 DIAGNOSIS — F33.42 RECURRENT MAJOR DEPRESSIVE DISORDER, IN FULL REMISSION (HCC): ICD-10-CM

## 2021-12-09 DIAGNOSIS — Z96.651 PAIN DUE TO TOTAL RIGHT KNEE REPLACEMENT, SUBSEQUENT ENCOUNTER: ICD-10-CM

## 2021-12-09 DIAGNOSIS — M17.12 PRIMARY OSTEOARTHRITIS OF LEFT KNEE: ICD-10-CM

## 2021-12-09 DIAGNOSIS — J96.01 ACUTE RESPIRATORY FAILURE WITH HYPOXIA (HCC): Primary | ICD-10-CM

## 2021-12-09 DIAGNOSIS — M94.251 CHONDROMALACIA OF RIGHT HIP: ICD-10-CM

## 2021-12-09 DIAGNOSIS — M79.2 INTRACTABLE NEUROPATHIC PAIN OF KNEE, RIGHT: ICD-10-CM

## 2021-12-09 DIAGNOSIS — G89.29 CHRONIC PAIN OF LEFT KNEE: ICD-10-CM

## 2021-12-09 DIAGNOSIS — F41.1 GENERALIZED ANXIETY DISORDER: ICD-10-CM

## 2021-12-09 DIAGNOSIS — M25.561 CHRONIC KNEE PAIN AFTER TOTAL REPLACEMENT OF RIGHT KNEE JOINT: ICD-10-CM

## 2021-12-09 DIAGNOSIS — G47.33 OBSTRUCTIVE SLEEP APNEA SYNDROME: ICD-10-CM

## 2021-12-09 PROBLEM — J45.909 UNSPECIFIED ASTHMA, UNCOMPLICATED: Status: ACTIVE | Noted: 2021-11-22

## 2021-12-09 PROBLEM — J45.909 UNSPECIFIED ASTHMA, UNCOMPLICATED (HCC): Status: ACTIVE | Noted: 2021-11-22

## 2021-12-09 PROBLEM — H91.93 UNSPECIFIED HEARING LOSS, BILATERAL: Status: ACTIVE | Noted: 2021-11-22

## 2021-12-09 PROBLEM — R49.0 DYSPHONIA: Status: ACTIVE | Noted: 2021-11-22

## 2021-12-09 PROBLEM — J01.21 ACUTE RECURRENT ETHMOIDAL SINUSITIS: Status: RESOLVED | Noted: 2019-05-31 | Resolved: 2021-12-09

## 2021-12-09 PROBLEM — M62.81 MUSCLE WEAKNESS (GENERALIZED): Status: ACTIVE | Noted: 2021-11-22

## 2021-12-09 PROBLEM — F33.9 MAJOR DEPRESSIVE DISORDER, RECURRENT, UNSPECIFIED: Status: RESOLVED | Noted: 2021-11-22 | Resolved: 2021-12-09

## 2021-12-09 PROBLEM — M19.90 UNSPECIFIED OSTEOARTHRITIS, UNSPECIFIED SITE: Status: ACTIVE | Noted: 2021-11-22

## 2021-12-09 PROBLEM — F33.9 MAJOR DEPRESSIVE DISORDER, RECURRENT, UNSPECIFIED (HCC): Status: RESOLVED | Noted: 2021-11-22 | Resolved: 2021-12-09

## 2021-12-09 PROBLEM — F33.9 MAJOR DEPRESSIVE DISORDER, RECURRENT, UNSPECIFIED: Status: ACTIVE | Noted: 2021-11-22

## 2021-12-09 PROBLEM — F33.9 MAJOR DEPRESSIVE DISORDER, RECURRENT, UNSPECIFIED (HCC): Status: ACTIVE | Noted: 2021-11-22

## 2021-12-09 PROBLEM — E78.5 HYPERLIPIDEMIA, UNSPECIFIED: Status: ACTIVE | Noted: 2021-11-22

## 2021-12-09 PROCEDURE — 99495 TRANSJ CARE MGMT MOD F2F 14D: CPT | Performed by: FAMILY MEDICINE

## 2021-12-09 PROCEDURE — 1111F DSCHRG MED/CURRENT MED MERGE: CPT | Performed by: FAMILY MEDICINE

## 2021-12-09 PROCEDURE — 82570 ASSAY OF URINE CREATININE: CPT | Performed by: FAMILY MEDICINE

## 2021-12-09 PROCEDURE — 82043 UR ALBUMIN QUANTITATIVE: CPT | Performed by: FAMILY MEDICINE

## 2021-12-09 RX ORDER — AMOXICILLIN 250 MG
2 CAPSULE ORAL
COMMUNITY
Start: 2021-11-23

## 2021-12-09 RX ORDER — LEVOTHYROXINE SODIUM 88 UG/1
88 TABLET ORAL
Qty: 90 TABLET | Refills: 0 | Status: SHIPPED | OUTPATIENT
Start: 2021-12-09

## 2021-12-09 RX ORDER — ATORVASTATIN CALCIUM 40 MG/1
40 TABLET, FILM COATED ORAL NIGHTLY
Qty: 90 TABLET | Refills: 0 | Status: SHIPPED | OUTPATIENT
Start: 2021-12-09

## 2021-12-09 RX ORDER — BLOOD SUGAR DIAGNOSTIC
1 STRIP MISCELLANEOUS 2 TIMES DAILY
Qty: 200 STRIP | Refills: 0 | Status: SHIPPED | OUTPATIENT
Start: 2021-12-09

## 2021-12-09 RX ORDER — METFORMIN HYDROCHLORIDE 500 MG/1
500 TABLET, EXTENDED RELEASE ORAL
Qty: 90 TABLET | Refills: 0 | Status: SHIPPED | OUTPATIENT
Start: 2021-12-09 | End: 2022-01-25

## 2021-12-09 RX ORDER — LISINOPRIL 10 MG/1
10 TABLET ORAL DAILY
Qty: 90 TABLET | Refills: 0 | Status: SHIPPED | OUTPATIENT
Start: 2021-12-09

## 2021-12-09 RX ORDER — DIVALPROEX SODIUM 500 MG/1
500 TABLET, EXTENDED RELEASE ORAL 2 TIMES DAILY
Qty: 10 TABLET | Refills: 0 | Status: SHIPPED | OUTPATIENT
Start: 2021-12-09 | End: 2022-01-03

## 2021-12-09 NOTE — PROGRESS NOTES
Multiple attempts made to reach the patient for hospital follow up, with no response or return call. Patient completed HFU on 12-9-21. [facility] closing encounter.

## 2021-12-09 NOTE — PROGRESS NOTES
HPI:    Brandon Gil is a 70year old female here today for hospital follow up.    She was discharged from Inpatient hospital[facility]-discharged from [facility] 12/5/2021  Admission Date: 11/17/2021  Discharge Date: 11/22/ her daughter. Her daughter states she sounded confused and called 911. Her daughter drove to her house and paramedics arrived and patient's pulse ox was 80%.   Patient was taken to [facility], ER where she was admitted for increasing shortness of breath mouth.  metFORMIN HCl  MG Oral Tablet 24 Hr,   Omeprazole 40 MG Oral Capsule Delayed Release,   Cranberry 600 MG Oral Tab, Take 6 tablets by mouth once daily.   metFORMIN HCl ER, MOD, 500 MG Oral Tablet 24 Hr, Take 1 tablet by mouth 2 (two) times june QPM  Spacer/Aero-Holding Chambers Count includes the [facility]) Does not apply Misc, Use as directed   ClonazePAM 0.5 MG Oral Tab, TK SS T PO BID UTD  Probiotic Product (PROBIOTIC OR), Take 1 capsule by mouth every morning.  Indications: supplement  Cholecalciferol Disorder in her maternal uncle; Hypertension in her father, mother, and paternal grandmother; Lipids in her mother; Obesity in her paternal grandmother. She  reports that she has never smoked.  She has never used smokeless tobacco. She reports that she do auscultation  CARDIO: RRR without murmur  GI: good BS's, no masses, HSM or tenderness  MUSCULOSKELETAL: back is not tender, FROM of the extremities  EXTREMITIES: no cyanosis, clubbing or edema-increase flexion and extension of right knee from prior evaluat MCG Oral Tab; Take 1 tablet (88 mcg total) by mouth before breakfast.  Euthymic    Mixed hyperlipidemia  -     atorvastatin 40 MG Oral Tab; Take 1 tablet (40 mg total) by mouth nightly. AVOID GRAPEFRUIT JUICE  -      COMP METABOLIC PANEL (14);  Future  - of left knee  Intractable neuropathic pain of knee, right  Primary osteoarthritis of left knee  Pain due to total right knee replacement, subsequent encounter   Continue Tylenol arthritis, gabapentin, cannabis topical lotion   2 weeks in rehab has increase Piotr Alexis DO, 12/9/2021

## 2021-12-11 PROBLEM — M19.90 UNSPECIFIED OSTEOARTHRITIS, UNSPECIFIED SITE: Status: RESOLVED | Noted: 2021-11-22 | Resolved: 2021-12-11

## 2021-12-11 PROBLEM — J45.909 UNSPECIFIED ASTHMA, UNCOMPLICATED (HCC): Status: RESOLVED | Noted: 2021-11-22 | Resolved: 2021-12-11

## 2021-12-11 PROBLEM — E78.2 MIXED HYPERLIPIDEMIA: Status: ACTIVE | Noted: 2021-11-22

## 2021-12-11 PROBLEM — Z87.01 HISTORY OF ASPIRATION PNEUMONIA: Status: ACTIVE | Noted: 2021-12-11

## 2021-12-11 PROBLEM — J45.909 UNSPECIFIED ASTHMA, UNCOMPLICATED: Status: RESOLVED | Noted: 2021-11-22 | Resolved: 2021-12-11

## 2021-12-11 PROBLEM — R49.0 DYSPHONIA: Status: RESOLVED | Noted: 2021-11-22 | Resolved: 2021-12-11

## 2021-12-15 ENCOUNTER — TELEPHONE (OUTPATIENT)
Dept: FAMILY MEDICINE CLINIC | Facility: CLINIC | Age: 71
End: 2021-12-15

## 2021-12-15 NOTE — TELEPHONE ENCOUNTER
Patient notified of test results and informed of Provider recommendations. Patient verbalizes understanding, but is curious if she should go back to taking her metformin bid because this result is still elevated albeit trending down. Please advise.  Manpreet Rai

## 2021-12-15 NOTE — TELEPHONE ENCOUNTER
----- Message from Ubaldo Peoples DO sent at 12/12/2021 12:41 PM CST -----  Results reviewed. Released to Marco Antonio Ringling. Ye Mtz,  I have reviewed your test results. Urine microalbumin demonstrates protein in the urine. Slightly decreased since prior.   Please

## 2021-12-16 NOTE — TELEPHONE ENCOUNTER
Sent Dr. Akers to pt my chart and left detailed VM.  Provided pt office number to return call with any questions or concerns

## 2021-12-16 NOTE — TELEPHONE ENCOUNTER
Component   Ref Range & Units 3 wk ago Comments   HEMOGLOBIN A1C    % 5.9  As recommended by the ADA, patients in the range of 5.7 to 6.4 %HbA1c would   be in the category of increased risk for diabetes and results >= 6.5 %HbA1c   may aid in the diagnosis

## 2021-12-17 ENCOUNTER — MED REC SCAN ONLY (OUTPATIENT)
Dept: FAMILY MEDICINE CLINIC | Facility: CLINIC | Age: 71
End: 2021-12-17

## 2021-12-17 ENCOUNTER — TELEPHONE (OUTPATIENT)
Dept: FAMILY MEDICINE CLINIC | Facility: CLINIC | Age: 71
End: 2021-12-17

## 2021-12-17 DIAGNOSIS — G25.81 RLS (RESTLESS LEGS SYNDROME): ICD-10-CM

## 2021-12-20 ENCOUNTER — OFFICE VISIT (OUTPATIENT)
Dept: NEUROLOGY | Facility: CLINIC | Age: 71
End: 2021-12-20
Payer: MEDICARE

## 2021-12-20 VITALS
WEIGHT: 221 LBS | BODY MASS INDEX: 38 KG/M2 | SYSTOLIC BLOOD PRESSURE: 138 MMHG | HEART RATE: 78 BPM | DIASTOLIC BLOOD PRESSURE: 76 MMHG | RESPIRATION RATE: 18 BRPM

## 2021-12-20 DIAGNOSIS — G43.709 CHRONIC MIGRAINE WITHOUT AURA WITHOUT STATUS MIGRAINOSUS, NOT INTRACTABLE: Primary | ICD-10-CM

## 2021-12-20 PROCEDURE — 99213 OFFICE O/P EST LOW 20 MIN: CPT | Performed by: OTHER

## 2021-12-20 RX ORDER — GABAPENTIN 300 MG/1
300 CAPSULE ORAL NIGHTLY
Qty: 90 CAPSULE | Refills: 1 | Status: SHIPPED | OUTPATIENT
Start: 2021-12-20

## 2021-12-20 NOTE — PROGRESS NOTES
Joanna 1827   Neurology- follow up    Ember Dates [place] Patient Status:  No patient class for patient encounter    1950 MRN [id number]   Location [address] [place] [address], [address] [place] [address], [address] [place] [address] PCP Michela Sever, DO mmol/L 101   Carbon Dioxide, Total      21.0 - 32.0 mmol/L 22.0   ANION GAP      0 - 18 mmol/L 12   BUN      7 - 18 mg/dL 12   CREATININE      0.55 - 1.02 mg/dL 0.79   CALCIUM      8.5 - 10.1 mg/dL 10.1   CALCULATED OSMOLALITY      275 - 295 mOsm/kg 279 hemorrhoids- repeat colonscopy 4/7/2017 negative- repeat in 5 years. • Hypothyroidism 2011    acquired.  no cancer or nodules   • Impingement syndrome, shoulder, left 3/24/2017   • Migraines    • Normal vaginal delivery     [id number]   • Obesity 1987   • mouth nightly.  AVOID GRAPEFRUIT JUICE, Disp: 90 tablet, Rfl: 0  •  levothyroxine 88 MCG Oral Tab, Take 1 tablet (88 mcg total) by mouth before breakfast., Disp: 90 tablet, Rfl: 0  •  metFORMIN HCl  MG Oral Tablet 24 Hr, Take 1 tablet (500 mg total) b •  Montelukast Sodium 10 MG Oral Tab, Take 1 tablet (10 mg total) by mouth daily. , Disp: 90 tablet, Rfl: 3  •  Aspirin-Acetaminophen-Caffeine (EXCEDRIN EXTRA STRENGTH OR), Take 2 tablets by mouth every 6 (six) hours as needed. , Disp: , Rfl:   •  Sertrali is coherent and fluent without aphasia. Memory, comprehension and ability to follow commands were intact. Cranial nerves II-XII: Optic discs were sharp. Pupils were round and reacted to light. Extraocular movements were full.  There was no face, jaw, Tribe

## 2021-12-20 NOTE — TELEPHONE ENCOUNTER
Refill no protocol available:     Pt requesting refill of   Requested Prescriptions     Pending Prescriptions Disp Refills   • GABAPENTIN 300 MG Oral Cap [Pharmacy Med Name: GABAPENTIN 300 MG CAPSULE] 30 capsule 1     Sig: TAKE 1 CAPSULE BY MOUTH EVERY DAY

## 2022-01-01 DIAGNOSIS — G43.709 CHRONIC MIGRAINE WITHOUT AURA WITHOUT STATUS MIGRAINOSUS, NOT INTRACTABLE: ICD-10-CM

## 2022-01-01 NOTE — TELEPHONE ENCOUNTER
There are several medications that can be tried to treat restless leg. They are typically not prescribed over a phone call without going through risks and benefits and discussing options. Patient may have video visit.   Make sure you are drinking adequa Yes

## 2022-01-03 RX ORDER — DIVALPROEX SODIUM 500 MG/1
TABLET, EXTENDED RELEASE ORAL
Qty: 60 TABLET | Refills: 2 | Status: SHIPPED | OUTPATIENT
Start: 2022-01-03

## 2022-01-03 NOTE — TELEPHONE ENCOUNTER
Medication: DIVALPROEX 500 MG Oral Tablet 24 Hr     Date of last refill: 12/09/2021 (#10/0)  [place]     Last office visit: 12/20/2021  Due back to clinic per last office note:  Around 12/20/2022  Date next office vi

## 2022-01-03 NOTE — TELEPHONE ENCOUNTER
Patient notified  Patient verbalizes understanding.   She has phone number Requested Prescriptions     Pending Prescriptions Disp Refills   • NP THYROID 60 MG Oral Tab [Pharmacy Med Name: NP THYROID 60 MG TABLET] 180 tablet 1     Sig: TAKE 2 TABLETS BY MOUTH DAILY.      LAST REFILL DATE 06/30/2021   QUANTITY REQUESTED 180    # D

## 2022-01-04 ENCOUNTER — TELEPHONE (OUTPATIENT)
Dept: FAMILY MEDICINE CLINIC | Facility: CLINIC | Age: 72
End: 2022-01-04

## 2022-01-06 NOTE — TELEPHONE ENCOUNTER
Spoke to  Rodríguez requesting additional extension on PT, states therapist working with pt now will have to evaluate need for extension and reach out to us to inform.      Provided office number [phone], awaiting call back

## 2022-01-10 NOTE — TELEPHONE ENCOUNTER
Spoke to Brad and discussed potential transportation issue for outpatient PT. States pt was driving until recently due to snow and has daughter who can provide reliable transportation.  Pt has apt to see patient this afternoon and will discuss with wendie

## 2022-01-10 NOTE — TELEPHONE ENCOUNTER
Spoke with [facility]   Pt still c/o R knee pain which is chronic and has always bothered her   Alicia Jacobs was planning final visit with pt today   He states she is very independent, was going for walks outside with her daughter before the snow

## 2022-01-10 NOTE — TELEPHONE ENCOUNTER
Patient may continue home physical therapy if still qualifies. I am not certain if she is able to drive.

## 2022-01-11 NOTE — TELEPHONE ENCOUNTER
LMOM on pt and daughter's voicemail to return call to the office.  Provided office phone [phone]

## 2022-01-11 NOTE — TELEPHONE ENCOUNTER
Will sign home PT if qualifies or will sign PT order as outpatient. If daughter will drive to outpatient PT and does not qualify for home PT, please pend order and will sign.     Thank you

## 2022-01-18 ENCOUNTER — TELEPHONE (OUTPATIENT)
Dept: FAMILY MEDICINE CLINIC | Facility: CLINIC | Age: 72
End: 2022-01-18

## 2022-01-18 DIAGNOSIS — R53.81 PHYSICAL DECONDITIONING: ICD-10-CM

## 2022-01-18 DIAGNOSIS — Z96.651 CHRONIC KNEE PAIN AFTER TOTAL REPLACEMENT OF RIGHT KNEE JOINT: Primary | ICD-10-CM

## 2022-01-18 DIAGNOSIS — Z91.81 AT RISK FOR FALLS: ICD-10-CM

## 2022-01-18 DIAGNOSIS — G89.29 CHRONIC KNEE PAIN AFTER TOTAL REPLACEMENT OF RIGHT KNEE JOINT: Primary | ICD-10-CM

## 2022-01-18 DIAGNOSIS — M25.561 CHRONIC KNEE PAIN AFTER TOTAL REPLACEMENT OF RIGHT KNEE JOINT: Primary | ICD-10-CM

## 2022-01-18 NOTE — TELEPHONE ENCOUNTER
Pt states she would like to continue with PT as outpatient. She would plan to have her daughter take her to PT sevices  She would like to complete through [facility]. Order for PT pending.  Please review and sign if ok    Pt has been on Trulicity first prescri

## 2022-01-18 NOTE — TELEPHONE ENCOUNTER
Patient states she is done with PT at home and they have done everything they can for her at this point. She would like to begin PT at another facility but not sure where she can go to get the most of her insurance benefits.    Also, she states she has been

## 2022-01-19 NOTE — TELEPHONE ENCOUNTER
Signed physical therapy order. Please update her A1c from November in care [facility]  Have patient schedule a video visit so can address  Trulicity dosing and diabetes.     Please inform

## 2022-01-19 NOTE — TELEPHONE ENCOUNTER
Spoke to pt informed of PT authorized and provided scheduling number [phone].   Pt also scheduled apt to discuss medication 01/25/2022

## 2022-01-23 DIAGNOSIS — E03.9 ACQUIRED HYPOTHYROIDISM: ICD-10-CM

## 2022-01-23 DIAGNOSIS — I10 BENIGN ESSENTIAL HTN: ICD-10-CM

## 2022-01-23 DIAGNOSIS — Z79.4 TYPE 2 DIABETES MELLITUS WITH DIABETIC NEUROPATHY, WITH LONG-TERM CURRENT USE OF INSULIN (HCC): ICD-10-CM

## 2022-01-23 DIAGNOSIS — E11.40 TYPE 2 DIABETES MELLITUS WITH DIABETIC NEUROPATHY, WITH LONG-TERM CURRENT USE OF INSULIN (HCC): ICD-10-CM

## 2022-01-23 DIAGNOSIS — Z87.19 PERSONAL HISTORY OF OTHER DISEASES OF THE DIGESTIVE SYSTEM: ICD-10-CM

## 2022-01-24 RX ORDER — OMEPRAZOLE 40 MG/1
CAPSULE, DELAYED RELEASE ORAL
Qty: 90 CAPSULE | Refills: 1 | Status: SHIPPED | OUTPATIENT
Start: 2022-01-24

## 2022-01-24 RX ORDER — METFORMIN HYDROCHLORIDE 500 MG/1
TABLET, EXTENDED RELEASE ORAL
Qty: 90 TABLET | Refills: 0 | OUTPATIENT
Start: 2022-01-24

## 2022-01-24 RX ORDER — LISINOPRIL 10 MG/1
TABLET ORAL
Qty: 90 TABLET | Refills: 0 | OUTPATIENT
Start: 2022-01-24

## 2022-01-24 RX ORDER — LEVOTHYROXINE SODIUM 88 UG/1
TABLET ORAL
Qty: 90 TABLET | Refills: 0 | OUTPATIENT
Start: 2022-01-24

## 2022-01-24 NOTE — TELEPHONE ENCOUNTER
Refill requested too soon:     Pt requesting refill of   Requested Prescriptions     Pending Prescriptions Disp Refills   • OMEPRAZOLE 40 MG Oral Capsule Delayed Release [Pharmacy Med Name: OMEPRAZOLE DR 40 MG CAPSULE] 90 capsule 1     Sig: TAKE 1 CAPSULE

## 2022-01-24 NOTE — TELEPHONE ENCOUNTER
Pt called the office statin that she needs a refill on her diabetes medication. Pt states that she does not have any more of it left and needs a refill ASAP.

## 2022-01-25 ENCOUNTER — TELEMEDICINE (OUTPATIENT)
Dept: FAMILY MEDICINE CLINIC | Facility: CLINIC | Age: 72
End: 2022-01-25

## 2022-01-25 ENCOUNTER — MED REC SCAN ONLY (OUTPATIENT)
Dept: FAMILY MEDICINE CLINIC | Facility: CLINIC | Age: 72
End: 2022-01-25

## 2022-01-25 DIAGNOSIS — Z79.4 TYPE 2 DIABETES MELLITUS WITH DIABETIC NEUROPATHY, WITH LONG-TERM CURRENT USE OF INSULIN (HCC): Primary | ICD-10-CM

## 2022-01-25 DIAGNOSIS — E11.40 TYPE 2 DIABETES MELLITUS WITH DIABETIC NEUROPATHY, WITH LONG-TERM CURRENT USE OF INSULIN (HCC): Primary | ICD-10-CM

## 2022-01-25 LAB
CARTRIDGE EXPIRATION DATE: 0 DATE
CARTRIDGE LOT#: 0 NUMERIC
HEMOGLOBIN A1C: 5.9 % (ref 4.3–5.6)

## 2022-01-25 PROCEDURE — 99213 OFFICE O/P EST LOW 20 MIN: CPT | Performed by: FAMILY MEDICINE

## 2022-01-25 RX ORDER — METFORMIN HYDROCHLORIDE 500 MG/1
500 TABLET, EXTENDED RELEASE ORAL
Qty: 90 TABLET | Refills: 1 | Status: SHIPPED | OUTPATIENT
Start: 2022-01-25

## 2022-01-25 RX ORDER — DULAGLUTIDE 1.5 MG/.5ML
1.5 INJECTION, SOLUTION SUBCUTANEOUS WEEKLY
Qty: 18 ML | Refills: 1 | Status: SHIPPED | OUTPATIENT
Start: 2022-01-25

## 2022-01-25 NOTE — TELEPHONE ENCOUNTER
LMOM to return call to the office. Provided pt office phone [phone]   Metformin filled 12/9/21 #90 - is this the med she needs?

## 2022-01-25 NOTE — PROGRESS NOTES
Due to COVID-19 ACTION PLAN, the patient's office visit was converted to a video visit.   Time Spent: 17 min    Patient presents with:  Medication Follow-Up    Subjective     HPI:   Alissa Winter is a 70year old female who presents for discuss diabete CAPSULE BY MOUTH EVERY MORNING AFTER SYNTHROID, Disp: 90 capsule, Rfl: 1  DIVALPROEX 500 MG Oral Tablet 24 Hr, TAKE 1 TABLET BY MOUTH TWICE A DAY, Disp: 60 tablet, Rfl: 2  gabapentin 300 MG Oral Cap, Take 1 capsule (300 mg total) by mouth nightly., Disp: 9 Does not apply Misc, TEST BLOOD SUGARS TWICE A DAY, Disp: 100 each, Rfl: 3  NON FORMULARY, Apply 100 mg topically once as needed.  Avexia medical cannabis pain relief balm 100 mg  mg CBD, Disp: , Rfl:   Montelukast Sodium 10 MG Oral Tab, Take 1 table vomiting, angioedema, increased heart rate, call ASAP  Encourage low-carb diet, consume foods or Mediterranean diet continue with weight loss  Patient with near normal A1c 5.9 11/18/2021 in care everywhere  Increase Trulicity to help with excellent glycemi

## 2022-01-28 NOTE — TELEPHONE ENCOUNTER
Case Management Discharge Planning Note    Patient name Nico Devlin  Location S /S -01 MRN [id number]  : 1958 Date 2022       Current Admission Date: 2022  Current Admission Diagnosis:Acute pancreatitis without infection or necrosis   Patient Active Problem List    Diagnosis Date Noted    New onset atrial fibrillation with RVR 2022    Acute pancreatitis without infection or necrosis 2022    Type 2 diabetes mellitus without complication, without long-term current use of insulin ([place] [address] ) 2022    Other constipation 2022    Chronic diastolic congestive heart failure ([place] [address] ) 2020    Vitamin D deficiency 2019    Benign essential hypertension 2019    Hypokalemia 2019    Urgency of urination 2019    Hypertension 2019    Depression 2019    Chronic congestive heart failure ([place] [address] ) 2019    Morbid obesity with BMI of 45 0-49 9, adult ([place] [address] ) 2019    Rosacea 10/25/2018    Acute bronchitis 10/04/2018    Chronic left hip pain 2018    Chronic pain of left knee 2018    GERD without esophagitis 2018    Dyspnea on exertion 2018    Chest pain, atypical 2018    Morbid obesity with body mass index of 50 or higher ([place] [address] ) 2018    Hematospermia 2018    Erectile dysfunction 2017    BPH (benign prostatic hyperplasia) 2017    Sleep apnea 2017    Chronic obstructive pulmonary disease ([place] [address] ) 2013    Asthma 2013      LOS (days): 3  Geometric Mean LOS (GMLOS) (days): 3 10  Days to GMLOS:0 2     OBJECTIVE:  Risk of Unplanned Readmission Score: 18         Current admission status: Inpatient   Preferred Pharmacy:   [facility] [address] [facility], [address] [place] [address] [address] [facility] [address]  Phone: [phone] Fax: [address] [facility], [address] [place] [address] Drive Pt requesting refill of   Requested Prescriptions     Pending Prescriptions Disp Refills   • MONTELUKAST SODIUM 10 MG Oral Tab [Pharmacy Med Name: MONTELUKAST SOD 10 MG TABLET] 90 tablet 1     Sig: TAKE 1 TABLET BY MOUTH EVERY DAY     Passed protocol, re [address] [facility] [address]  Phone: [phone] Fax: [phone]    [id number] [place] [facility] - [address] [facility]  [address] [facility] [address]  Phone: [phone] Fax: [address], [address] [place] [address]  Phone: [phone] Fax: [phone]    Primary Care Provider: Farzana Granados DO    Primary Insurance: MEDICARE  Secondary Insurance: [employer] ADMINISTRATORS    DISCHARGE DETAILS:    Discharge planning discussed with[de-identified] patient at bedside                                     Other Referral/Resources/Interventions Provided:  Interventions: Prescription Price Check  Referral Comments: Call made to [facility] to inquire about cost of Eliquis; per pharmacist, Eliquis comes to $20 and metropolol is $2; both are ready for fill  Confirmed that patient has a commercial plan; would qualify for co-pay and free-trial card  Met with patient at bedside to discuss d/c for today  Reviewed medication costs and provided coupons for Eliquis  Patient aware that medications are at [place] and confirmed affordability for same  Per RN, patient is not very mobile; spoke with patient about possible home vs out-patient services, but patient declines same  Reports that spouse will be to hospital this afternoon to transport him home  IMM form reviewed with patient who confirmed d/c for today; denies any concerns about same  Copy provided for his records      Would you like to participate in our [address] service program?  : No - Declined    Treatment Team Recommendation: Home  Discharge Destination Plan[de-identified] Home  Transport at Discharge : Automobile,Family                             IMM Given (Date):: 01/28/22  IMM Given to[de-identified] Patient

## 2022-01-31 ENCOUNTER — TELEPHONE (OUTPATIENT)
Dept: PHYSICAL THERAPY | Facility: HOSPITAL | Age: 72
End: 2022-01-31

## 2022-01-31 ENCOUNTER — OFFICE VISIT (OUTPATIENT)
Dept: PHYSICAL THERAPY | Age: 72
End: 2022-01-31
Attending: FAMILY MEDICINE
Payer: MEDICARE

## 2022-01-31 DIAGNOSIS — Z91.81 AT RISK FOR FALLS: ICD-10-CM

## 2022-01-31 DIAGNOSIS — M25.561 CHRONIC KNEE PAIN AFTER TOTAL REPLACEMENT OF RIGHT KNEE JOINT: ICD-10-CM

## 2022-01-31 DIAGNOSIS — R53.81 PHYSICAL DECONDITIONING: ICD-10-CM

## 2022-01-31 DIAGNOSIS — Z96.651 CHRONIC KNEE PAIN AFTER TOTAL REPLACEMENT OF RIGHT KNEE JOINT: ICD-10-CM

## 2022-01-31 DIAGNOSIS — G89.29 CHRONIC KNEE PAIN AFTER TOTAL REPLACEMENT OF RIGHT KNEE JOINT: ICD-10-CM

## 2022-01-31 PROCEDURE — 97110 THERAPEUTIC EXERCISES: CPT | Performed by: PHYSICAL THERAPIST

## 2022-01-31 PROCEDURE — 97162 PT EVAL MOD COMPLEX 30 MIN: CPT | Performed by: PHYSICAL THERAPIST

## 2022-02-02 ENCOUNTER — APPOINTMENT (OUTPATIENT)
Dept: PHYSICAL THERAPY | Age: 72
End: 2022-02-02
Attending: FAMILY MEDICINE
Payer: MEDICARE

## 2022-02-07 ENCOUNTER — OFFICE VISIT (OUTPATIENT)
Dept: PHYSICAL THERAPY | Age: 72
End: 2022-02-07
Attending: FAMILY MEDICINE
Payer: MEDICARE

## 2022-02-07 PROCEDURE — 97110 THERAPEUTIC EXERCISES: CPT

## 2022-02-07 PROCEDURE — 97112 NEUROMUSCULAR REEDUCATION: CPT

## 2022-02-09 ENCOUNTER — APPOINTMENT (OUTPATIENT)
Dept: PHYSICAL THERAPY | Age: 72
End: 2022-02-09
Attending: FAMILY MEDICINE
Payer: MEDICARE

## 2022-02-11 ENCOUNTER — TELEPHONE (OUTPATIENT)
Dept: SURGERY | Facility: CLINIC | Age: 72
End: 2022-02-11

## 2022-02-11 NOTE — TELEPHONE ENCOUNTER
Message left on VM (ok per HIPAA consent) that Dr Sue Rodriguez recommended Excedrin Migraine as abortive and to call back on Monday if that is not working for her and to go to Er or UC if symptoms are severe.

## 2022-02-11 NOTE — TELEPHONE ENCOUNTER
Pt called stating she has had a migraine for 3 days. Pt asking if there is a medication she can take when she feels the onset of a migraine. Please advise.

## 2022-02-14 ENCOUNTER — OFFICE VISIT (OUTPATIENT)
Dept: PHYSICAL THERAPY | Age: 72
End: 2022-02-14
Attending: FAMILY MEDICINE
Payer: MEDICARE

## 2022-02-14 PROCEDURE — 97112 NEUROMUSCULAR REEDUCATION: CPT

## 2022-02-14 PROCEDURE — 97110 THERAPEUTIC EXERCISES: CPT

## 2022-02-15 RX ORDER — METHYLPREDNISOLONE 4 MG/1
TABLET ORAL
Qty: 1 EACH | Refills: 0 | Status: SHIPPED | OUTPATIENT
Start: 2022-02-15 | End: 2022-03-25 | Stop reason: ALTCHOICE

## 2022-02-15 RX ORDER — BUTORPHANOL TARTRATE 10 MG/ML
1 SPRAY, METERED NASAL EVERY 4 HOURS PRN
Qty: 1 EACH | Refills: 0 | Status: SHIPPED | OUTPATIENT
Start: 2022-02-15 | End: 2022-03-25

## 2022-02-15 RX ORDER — BUTORPHANOL TARTRATE 10 MG/ML
1 SPRAY, METERED NASAL EVERY 4 HOURS PRN
Qty: 1 EACH | Refills: 0 | OUTPATIENT
Start: 2022-02-15

## 2022-02-15 NOTE — TELEPHONE ENCOUNTER
Pt states the excedrin helped for awhile but her migraine is back. She also states she increased her depakote back up to BID last week and that has not made a difference either. She is looking for further recommendations. Will route to provider for advisement.

## 2022-02-15 NOTE — TELEPHONE ENCOUNTER
MDP and stadol for acute migraine. Patient to be aware that stadol is a narcotic and can cause drowsiness. Only for short term. Ok to increase depakote- I would do so for one month, then try to go back to nightly. One script for stadol accidentally printed, please void.     Patient to schedule follow up next

## 2022-02-16 ENCOUNTER — TELEPHONE (OUTPATIENT)
Dept: PHYSICAL THERAPY | Facility: HOSPITAL | Age: 72
End: 2022-02-16

## 2022-02-16 ENCOUNTER — APPOINTMENT (OUTPATIENT)
Dept: PHYSICAL THERAPY | Age: 72
End: 2022-02-16
Attending: FAMILY MEDICINE
Payer: MEDICARE

## 2022-02-21 ENCOUNTER — APPOINTMENT (OUTPATIENT)
Dept: PHYSICAL THERAPY | Age: 72
End: 2022-02-21
Attending: FAMILY MEDICINE
Payer: MEDICARE

## 2022-02-23 ENCOUNTER — APPOINTMENT (OUTPATIENT)
Dept: PHYSICAL THERAPY | Age: 72
End: 2022-02-23
Attending: FAMILY MEDICINE
Payer: MEDICARE

## 2022-02-25 ENCOUNTER — TELEPHONE (OUTPATIENT)
Dept: PHYSICAL THERAPY | Facility: HOSPITAL | Age: 72
End: 2022-02-25

## 2022-02-28 ENCOUNTER — APPOINTMENT (OUTPATIENT)
Dept: PHYSICAL THERAPY | Age: 72
End: 2022-02-28
Attending: FAMILY MEDICINE
Payer: MEDICARE

## 2022-02-28 ENCOUNTER — TELEPHONE (OUTPATIENT)
Dept: PHYSICAL THERAPY | Facility: HOSPITAL | Age: 72
End: 2022-02-28

## 2022-02-28 RX ORDER — MONTELUKAST SODIUM 10 MG/1
TABLET ORAL
Qty: 90 TABLET | Refills: 3 | Status: SHIPPED | OUTPATIENT
Start: 2022-02-28

## 2022-02-28 RX ORDER — BLOOD SUGAR DIAGNOSTIC
STRIP MISCELLANEOUS
Qty: 100 STRIP | Refills: 1 | Status: SHIPPED | OUTPATIENT
Start: 2022-02-28 | End: 2022-03-07

## 2022-03-02 ENCOUNTER — OFFICE VISIT (OUTPATIENT)
Dept: PHYSICAL THERAPY | Age: 72
End: 2022-03-02
Attending: FAMILY MEDICINE
Payer: MEDICARE

## 2022-03-02 PROCEDURE — 97110 THERAPEUTIC EXERCISES: CPT

## 2022-03-02 PROCEDURE — 97112 NEUROMUSCULAR REEDUCATION: CPT

## 2022-03-02 NOTE — PROGRESS NOTES
Patient came in for draw of ordered labs. Patient first draw attempt at [facility] successful but stoped flowing. Second draw attempt at [facility] unsuccessful. 3rd attempt at [facility] successful and tolerated well. 1 lt grn and 1 lav tube drawn. High Dose Vitamin A Counseling: Side effects reviewed, pt to contact office should one occur.

## 2022-03-07 ENCOUNTER — OFFICE VISIT (OUTPATIENT)
Dept: PHYSICAL THERAPY | Age: 72
End: 2022-03-07
Attending: FAMILY MEDICINE
Payer: MEDICARE

## 2022-03-07 PROCEDURE — 97110 THERAPEUTIC EXERCISES: CPT

## 2022-03-07 PROCEDURE — 97112 NEUROMUSCULAR REEDUCATION: CPT

## 2022-03-07 RX ORDER — BLOOD SUGAR DIAGNOSTIC
1 STRIP MISCELLANEOUS 2 TIMES DAILY
Qty: 100 STRIP | Refills: 1 | Status: SHIPPED | OUTPATIENT
Start: 2022-03-07

## 2022-03-07 NOTE — ADDENDUM NOTE
Addended by: Sherine Srinivasan on: 3/7/2022 02:59 PM     Modules accepted: Orders
Abdominal Pain, N/V/D
[facility]

## 2022-03-16 ENCOUNTER — TELEMEDICINE (OUTPATIENT)
Dept: FAMILY MEDICINE CLINIC | Facility: CLINIC | Age: 72
End: 2022-03-16

## 2022-03-16 ENCOUNTER — APPOINTMENT (OUTPATIENT)
Dept: PHYSICAL THERAPY | Age: 72
End: 2022-03-16
Attending: FAMILY MEDICINE
Payer: MEDICARE

## 2022-03-16 ENCOUNTER — TELEPHONE (OUTPATIENT)
Dept: FAMILY MEDICINE CLINIC | Facility: CLINIC | Age: 72
End: 2022-03-16

## 2022-03-16 DIAGNOSIS — J06.9 UPPER RESPIRATORY TRACT INFECTION, UNSPECIFIED TYPE: Primary | ICD-10-CM

## 2022-03-16 DIAGNOSIS — R05.9 COUGH: ICD-10-CM

## 2022-03-16 PROCEDURE — 99214 OFFICE O/P EST MOD 30 MIN: CPT | Performed by: FAMILY MEDICINE

## 2022-03-16 RX ORDER — BENZONATATE 200 MG/1
200 CAPSULE ORAL 3 TIMES DAILY PRN
Qty: 30 CAPSULE | Refills: 0 | Status: SHIPPED | OUTPATIENT
Start: 2022-03-16 | End: 2022-03-25

## 2022-03-16 RX ORDER — METFORMIN HYDROCHLORIDE 750 MG/1
TABLET, EXTENDED RELEASE ORAL
COMMUNITY
Start: 2022-02-25 | End: 2022-03-25 | Stop reason: ALTCHOICE

## 2022-03-16 RX ORDER — AMOXICILLIN AND CLAVULANATE POTASSIUM 875; 125 MG/1; MG/1
1 TABLET, FILM COATED ORAL 2 TIMES DAILY
Qty: 14 TABLET | Refills: 0 | Status: SHIPPED | OUTPATIENT
Start: 2022-03-16 | End: 2022-03-23

## 2022-03-16 NOTE — TELEPHONE ENCOUNTER
Pt called the office asking for a refill to be faxed to [facility] for her trulicity and it needs to say expedited. It needs specific steps please call pt back.

## 2022-03-16 NOTE — TELEPHONE ENCOUNTER
Per patient needs script to be send to fax number  [phone] [facility] medication [facility] [address] [facility] [place]  per patient would like expedited delivery

## 2022-03-18 NOTE — TELEPHONE ENCOUNTER
Paper rx faxed and confirmation received    Detailed message left on pt's voicemail letting her know

## 2022-03-23 ENCOUNTER — OFFICE VISIT (OUTPATIENT)
Dept: PHYSICAL THERAPY | Age: 72
End: 2022-03-23
Attending: FAMILY MEDICINE
Payer: MEDICARE

## 2022-03-23 PROCEDURE — 97110 THERAPEUTIC EXERCISES: CPT

## 2022-03-23 PROCEDURE — 97116 GAIT TRAINING THERAPY: CPT

## 2022-03-24 ENCOUNTER — TELEPHONE (OUTPATIENT)
Dept: FAMILY MEDICINE CLINIC | Facility: CLINIC | Age: 72
End: 2022-03-24

## 2022-03-24 NOTE — TELEPHONE ENCOUNTER
Pt called office stating that she had PT yesterday for her legs, and today she is sore on her (L) heel.

## 2022-03-24 NOTE — TELEPHONE ENCOUNTER
Patient with c/o of sharp pain when stepping on left heal that started yesterday. Pt denies any recent injury, swelling, redness, bruising, or discoloration to heal.   Has been taking Excedrin extra strength for pain requesting apt.   Pt scheduled apt for evaluation 03/25/2022 with

## 2022-03-25 ENCOUNTER — OFFICE VISIT (OUTPATIENT)
Dept: FAMILY MEDICINE CLINIC | Facility: CLINIC | Age: 72
End: 2022-03-25
Payer: MEDICARE

## 2022-03-25 VITALS
RESPIRATION RATE: 18 BRPM | SYSTOLIC BLOOD PRESSURE: 104 MMHG | WEIGHT: 231 LBS | BODY MASS INDEX: 39.44 KG/M2 | OXYGEN SATURATION: 96 % | DIASTOLIC BLOOD PRESSURE: 60 MMHG | HEIGHT: 64 IN | TEMPERATURE: 97 F | HEART RATE: 106 BPM

## 2022-03-25 DIAGNOSIS — M79.672 PAIN OF LEFT HEEL: ICD-10-CM

## 2022-03-25 DIAGNOSIS — J01.00 ACUTE MAXILLARY SINUSITIS, RECURRENCE NOT SPECIFIED: Primary | ICD-10-CM

## 2022-03-25 PROCEDURE — 99214 OFFICE O/P EST MOD 30 MIN: CPT | Performed by: FAMILY MEDICINE

## 2022-03-25 RX ORDER — AMOXICILLIN AND CLAVULANATE POTASSIUM 875; 125 MG/1; MG/1
1 TABLET, FILM COATED ORAL 2 TIMES DAILY
Qty: 14 TABLET | Refills: 0 | Status: SHIPPED | OUTPATIENT
Start: 2022-03-25 | End: 2022-04-01

## 2022-03-28 ENCOUNTER — HOSPITAL ENCOUNTER (OUTPATIENT)
Dept: GENERAL RADIOLOGY | Age: 72
Discharge: HOME | End: 2022-03-28
Attending: FAMILY MEDICINE
Payer: MEDICARE

## 2022-03-28 DIAGNOSIS — M79.672 PAIN OF LEFT HEEL: ICD-10-CM

## 2022-03-28 PROCEDURE — 73650 X-RAY EXAM OF HEEL: CPT | Performed by: FAMILY MEDICINE

## 2022-03-30 ENCOUNTER — APPOINTMENT (OUTPATIENT)
Dept: PHYSICAL THERAPY | Age: 72
End: 2022-03-30
Payer: MEDICARE

## 2022-03-30 ENCOUNTER — TELEPHONE (OUTPATIENT)
Dept: PHYSICAL THERAPY | Facility: HOSPITAL | Age: 72
End: 2022-03-30

## 2022-04-04 ENCOUNTER — TELEMEDICINE (OUTPATIENT)
Dept: FAMILY MEDICINE CLINIC | Facility: CLINIC | Age: 72
End: 2022-04-04
Payer: MEDICARE

## 2022-04-04 DIAGNOSIS — R05.3 PERSISTENT DRY COUGH: Primary | ICD-10-CM

## 2022-04-04 PROCEDURE — 99213 OFFICE O/P EST LOW 20 MIN: CPT | Performed by: FAMILY MEDICINE

## 2022-04-04 RX ORDER — FLUTICASONE PROPIONATE AND SALMETEROL XINAFOATE 115; 21 UG/1; UG/1
2 AEROSOL, METERED RESPIRATORY (INHALATION) 2 TIMES DAILY
Qty: 12 G | Refills: 1 | Status: SHIPPED | OUTPATIENT
Start: 2022-04-04

## 2022-04-05 ENCOUNTER — HOSPITAL ENCOUNTER (OUTPATIENT)
Dept: GENERAL RADIOLOGY | Age: 72
Discharge: HOME | End: 2022-04-05
Attending: FAMILY MEDICINE
Payer: MEDICARE

## 2022-04-05 DIAGNOSIS — R05.3 PERSISTENT DRY COUGH: ICD-10-CM

## 2022-04-05 PROCEDURE — 71046 X-RAY EXAM CHEST 2 VIEWS: CPT | Performed by: FAMILY MEDICINE

## 2022-04-06 ENCOUNTER — APPOINTMENT (OUTPATIENT)
Dept: PHYSICAL THERAPY | Age: 72
End: 2022-04-06
Payer: MEDICARE

## 2022-04-06 ENCOUNTER — MED REC SCAN ONLY (OUTPATIENT)
Dept: FAMILY MEDICINE CLINIC | Facility: CLINIC | Age: 72
End: 2022-04-06

## 2022-04-06 ENCOUNTER — TELEPHONE (OUTPATIENT)
Dept: PHYSICAL THERAPY | Facility: HOSPITAL | Age: 72
End: 2022-04-06

## 2022-04-08 ENCOUNTER — TELEPHONE (OUTPATIENT)
Dept: FAMILY MEDICINE CLINIC | Facility: CLINIC | Age: 72
End: 2022-04-08

## 2022-04-08 NOTE — TELEPHONE ENCOUNTER
----- Message from Sari oV DO sent at 4/6/2022  2:30 PM CDT -----  Results reviewed. Released to [address] Test show no significant abnormality. Demetrius Levin,  I have reviewed your test results. Tests show no significant abnormality. Chest x-ray is normal.  Please follow-up in 2 weeks to recheck your cough sooner if worse or if develop fever or new symptoms.   Sincerely,  Sari Vo DO

## 2022-04-13 ENCOUNTER — APPOINTMENT (OUTPATIENT)
Dept: PHYSICAL THERAPY | Age: 72
End: 2022-04-13
Payer: MEDICARE

## 2022-04-18 RX ORDER — LISINOPRIL 10 MG/1
TABLET ORAL
Qty: 90 TABLET | Refills: 0 | Status: SHIPPED | OUTPATIENT
Start: 2022-04-18

## 2022-05-06 RX ORDER — DIVALPROEX SODIUM 500 MG/1
500 TABLET, EXTENDED RELEASE ORAL DAILY
Qty: 30 TABLET | Refills: 2 | Status: SHIPPED | OUTPATIENT
Start: 2022-05-06

## 2022-05-06 NOTE — TELEPHONE ENCOUNTER
Medication: DIVALPROEX  MG Oral Tablet 24 Hr     Date of last refill: 11/15/2021 (#60/0)-discon  [place]     Last office visit: 12/20/2021  Due back to clinic per last office note:  Around 12/20/2022  Date next office visit scheduled:    Future Appointments   Date Time Provider Seb Montgomery   5/19/2022  9:00 AM Rao Giraldo,  [facility] [place]   5/25/2022  9:45 AM Nellie Pen, PT [facility] [place]   6/8/2022  9:45 AM Nellie Pen, PT [facility] [place]   6/15/2022  3:00 PM Nellie Pen, PT [facility] [place]   6/20/2022  4:45 PM [place] Pen, PT [facility] [place]   6/22/2022  9:45 AM Nellie Pen, PT [facility] [place]   6/27/2022  9:45 AM Nellie Pen, PT [facility] [place]   6/29/2022  9:45 AM Nellie Pen, PT [facility] [place]           Last OV note recommendation:    Nasal congestion trigger her migraines  Overall migraines are infrequent, so I recommend a trial of decreasing Depakote to 500mg ER nightly  CBC and CMP wnl  Discussed avoiding migraine triggers and to eat regular meals, practice good sleep hygiene  Excedrin extra strength or as an abortive  Update in 3 months, or sooner if needed

## 2022-05-19 ENCOUNTER — OFFICE VISIT (OUTPATIENT)
Dept: FAMILY MEDICINE CLINIC | Facility: CLINIC | Age: 72
End: 2022-05-19
Payer: MEDICARE

## 2022-05-19 VITALS
WEIGHT: 214.81 LBS | HEIGHT: 64 IN | TEMPERATURE: 98 F | DIASTOLIC BLOOD PRESSURE: 70 MMHG | HEART RATE: 99 BPM | RESPIRATION RATE: 16 BRPM | SYSTOLIC BLOOD PRESSURE: 118 MMHG | BODY MASS INDEX: 36.67 KG/M2 | OXYGEN SATURATION: 97 %

## 2022-05-19 DIAGNOSIS — Z96.651 PAIN DUE TO TOTAL RIGHT KNEE REPLACEMENT, SUBSEQUENT ENCOUNTER: ICD-10-CM

## 2022-05-19 DIAGNOSIS — K21.9 GASTRO-ESOPHAGEAL REFLUX DISEASE WITHOUT ESOPHAGITIS: ICD-10-CM

## 2022-05-19 DIAGNOSIS — Z98.890 HISTORY OF COLONOSCOPY WITH POLYPECTOMY: ICD-10-CM

## 2022-05-19 DIAGNOSIS — F41.1 GENERALIZED ANXIETY DISORDER: ICD-10-CM

## 2022-05-19 DIAGNOSIS — E03.9 ACQUIRED HYPOTHYROIDISM: ICD-10-CM

## 2022-05-19 DIAGNOSIS — Z01.419 WELL WOMAN EXAM WITH ROUTINE GYNECOLOGICAL EXAM: ICD-10-CM

## 2022-05-19 DIAGNOSIS — E11.40 TYPE 2 DIABETES MELLITUS WITH DIABETIC NEUROPATHY, WITH LONG-TERM CURRENT USE OF INSULIN (HCC): ICD-10-CM

## 2022-05-19 DIAGNOSIS — Z91.81 AT HIGH RISK FOR FALLS: ICD-10-CM

## 2022-05-19 DIAGNOSIS — Z00.00 ENCOUNTER FOR ANNUAL HEALTH EXAMINATION: Primary | ICD-10-CM

## 2022-05-19 DIAGNOSIS — Z23 NEED FOR HEPATITIS B VACCINATION: ICD-10-CM

## 2022-05-19 DIAGNOSIS — E78.2 MIXED HYPERLIPIDEMIA: ICD-10-CM

## 2022-05-19 DIAGNOSIS — Z79.4 TYPE 2 DIABETES MELLITUS WITH DIABETIC NEUROPATHY, WITH LONG-TERM CURRENT USE OF INSULIN (HCC): ICD-10-CM

## 2022-05-19 DIAGNOSIS — T84.84XD PAIN DUE TO TOTAL RIGHT KNEE REPLACEMENT, SUBSEQUENT ENCOUNTER: ICD-10-CM

## 2022-05-19 DIAGNOSIS — Z86.010 HISTORY OF COLONOSCOPY WITH POLYPECTOMY: ICD-10-CM

## 2022-05-19 DIAGNOSIS — Z12.31 ENCOUNTER FOR MAMMOGRAM TO ESTABLISH BASELINE MAMMOGRAM: ICD-10-CM

## 2022-05-19 DIAGNOSIS — Z12.11 SCREEN FOR COLON CANCER: ICD-10-CM

## 2022-05-19 DIAGNOSIS — I10 BENIGN ESSENTIAL HTN: ICD-10-CM

## 2022-05-19 DIAGNOSIS — F33.42 RECURRENT MAJOR DEPRESSIVE DISORDER, IN FULL REMISSION (HCC): ICD-10-CM

## 2022-05-19 DIAGNOSIS — G56.02 LEFT CARPAL TUNNEL SYNDROME: ICD-10-CM

## 2022-05-19 PROBLEM — J69.0 ASPIRATION PNEUMONIA OF BOTH LUNGS (HCC): Status: RESOLVED | Noted: 2021-11-29 | Resolved: 2022-05-19

## 2022-05-19 PROBLEM — M62.81 MUSCLE WEAKNESS (GENERALIZED): Status: RESOLVED | Noted: 2021-11-22 | Resolved: 2022-05-19

## 2022-05-19 PROBLEM — E11.9 NEW ONSET TYPE 2 DIABETES MELLITUS (HCC): Status: RESOLVED | Noted: 2021-03-25 | Resolved: 2022-05-19

## 2022-05-19 PROBLEM — J40: Status: RESOLVED | Noted: 2021-11-18 | Resolved: 2022-05-19

## 2022-05-19 PROBLEM — J96.01 ACUTE RESPIRATORY FAILURE WITH HYPOXIA (HCC): Status: RESOLVED | Noted: 2021-11-18 | Resolved: 2022-05-19

## 2022-05-19 PROBLEM — B97.89: Status: RESOLVED | Noted: 2021-11-18 | Resolved: 2022-05-19

## 2022-05-19 LAB
ALBUMIN SERPL-MCNC: 3.6 G/DL (ref 3.4–5)
ALBUMIN/GLOB SERPL: 1.3 {RATIO} (ref 1–2)
ALP LIVER SERPL-CCNC: 67 U/L
ALT SERPL-CCNC: 27 U/L
ANION GAP SERPL CALC-SCNC: 8 MMOL/L (ref 0–18)
AST SERPL-CCNC: 14 U/L (ref 15–37)
BASOPHILS # BLD AUTO: 0.04 X10(3) UL (ref 0–0.2)
BASOPHILS NFR BLD AUTO: 0.6 %
BILIRUB SERPL-MCNC: 0.3 MG/DL (ref 0.1–2)
BUN BLD-MCNC: 16 MG/DL (ref 7–18)
CALCIUM BLD-MCNC: 9.6 MG/DL (ref 8.5–10.1)
CHLORIDE SERPL-SCNC: 109 MMOL/L (ref 98–112)
CHOLEST SERPL-MCNC: 145 MG/DL (ref ?–200)
CO2 SERPL-SCNC: 23 MMOL/L (ref 21–32)
CREAT BLD-MCNC: 0.91 MG/DL
CREAT UR-SCNC: 124 MG/DL
EOSINOPHIL # BLD AUTO: 0.15 X10(3) UL (ref 0–0.7)
EOSINOPHIL NFR BLD AUTO: 2.2 %
ERYTHROCYTE [DISTWIDTH] IN BLOOD BY AUTOMATED COUNT: 17.1 %
EST. AVERAGE GLUCOSE BLD GHB EST-MCNC: 134 MG/DL (ref 68–126)
FASTING PATIENT LIPID ANSWER: YES
FASTING STATUS PATIENT QL REPORTED: YES
GLOBULIN PLAS-MCNC: 2.8 G/DL (ref 2.8–4.4)
GLUCOSE BLD-MCNC: 104 MG/DL (ref 70–99)
HBA1C MFR BLD: 6.3 % (ref ?–5.7)
HCT VFR BLD AUTO: 39.3 %
HDLC SERPL-MCNC: 57 MG/DL (ref 40–59)
HGB BLD-MCNC: 12.1 G/DL
IMM GRANULOCYTES # BLD AUTO: 0.01 X10(3) UL (ref 0–1)
IMM GRANULOCYTES NFR BLD: 0.1 %
LDLC SERPL CALC-MCNC: 71 MG/DL (ref ?–100)
LYMPHOCYTES # BLD AUTO: 2.18 X10(3) UL (ref 1–4)
LYMPHOCYTES NFR BLD AUTO: 32.5 %
MCH RBC QN AUTO: 25.7 PG (ref 26–34)
MCHC RBC AUTO-ENTMCNC: 30.8 G/DL (ref 31–37)
MCV RBC AUTO: 83.4 FL
MICROALBUMIN UR-MCNC: 1.02 MG/DL
MICROALBUMIN/CREAT 24H UR-RTO: 8.2 UG/MG (ref ?–30)
MONOCYTES # BLD AUTO: 0.58 X10(3) UL (ref 0.1–1)
MONOCYTES NFR BLD AUTO: 8.6 %
NEUTROPHILS # BLD AUTO: 3.75 X10 (3) UL (ref 1.5–7.7)
NEUTROPHILS # BLD AUTO: 3.75 X10(3) UL (ref 1.5–7.7)
NEUTROPHILS NFR BLD AUTO: 56 %
NONHDLC SERPL-MCNC: 88 MG/DL (ref ?–130)
OSMOLALITY SERPL CALC.SUM OF ELEC: 291 MOSM/KG (ref 275–295)
POTASSIUM SERPL-SCNC: 4.6 MMOL/L (ref 3.5–5.1)
PROT SERPL-MCNC: 6.4 G/DL (ref 6.4–8.2)
RBC # BLD AUTO: 4.71 X10(6)UL
SODIUM SERPL-SCNC: 140 MMOL/L (ref 136–145)
T4 FREE SERPL-MCNC: 0.9 NG/DL (ref 0.8–1.7)
TRIGL SERPL-MCNC: 89 MG/DL (ref 30–149)
TSI SER-ACNC: 1.02 MIU/ML (ref 0.36–3.74)
VLDLC SERPL CALC-MCNC: 14 MG/DL (ref 0–30)
WBC # BLD AUTO: 6.7 X10(3) UL (ref 4–11)

## 2022-05-19 PROCEDURE — 80061 LIPID PANEL: CPT | Performed by: FAMILY MEDICINE

## 2022-05-19 PROCEDURE — 36415 COLL VENOUS BLD VENIPUNCTURE: CPT | Performed by: FAMILY MEDICINE

## 2022-05-19 PROCEDURE — 82043 UR ALBUMIN QUANTITATIVE: CPT | Performed by: FAMILY MEDICINE

## 2022-05-19 PROCEDURE — 83036 HEMOGLOBIN GLYCOSYLATED A1C: CPT | Performed by: FAMILY MEDICINE

## 2022-05-19 PROCEDURE — 85025 COMPLETE CBC W/AUTO DIFF WBC: CPT | Performed by: FAMILY MEDICINE

## 2022-05-19 PROCEDURE — 82570 ASSAY OF URINE CREATININE: CPT | Performed by: FAMILY MEDICINE

## 2022-05-19 PROCEDURE — 84443 ASSAY THYROID STIM HORMONE: CPT | Performed by: FAMILY MEDICINE

## 2022-05-19 PROCEDURE — 84439 ASSAY OF FREE THYROXINE: CPT | Performed by: FAMILY MEDICINE

## 2022-05-19 PROCEDURE — 80053 COMPREHEN METABOLIC PANEL: CPT | Performed by: FAMILY MEDICINE

## 2022-05-19 RX ORDER — METFORMIN HYDROCHLORIDE 500 MG/1
500 TABLET, EXTENDED RELEASE ORAL
Qty: 90 TABLET | Refills: 1 | Status: SHIPPED | OUTPATIENT
Start: 2022-05-19

## 2022-05-19 RX ORDER — LEVOTHYROXINE SODIUM 88 UG/1
88 TABLET ORAL
Qty: 90 TABLET | Refills: 0 | Status: SHIPPED | OUTPATIENT
Start: 2022-05-19

## 2022-05-19 RX ORDER — DULAGLUTIDE 1.5 MG/.5ML
1.5 INJECTION, SOLUTION SUBCUTANEOUS WEEKLY
Qty: 18 ML | Refills: 1 | Status: SHIPPED | OUTPATIENT
Start: 2022-05-19

## 2022-05-19 RX ORDER — ATORVASTATIN CALCIUM 40 MG/1
40 TABLET, FILM COATED ORAL NIGHTLY
Qty: 90 TABLET | Refills: 0 | Status: SHIPPED | OUTPATIENT
Start: 2022-05-19

## 2022-05-19 RX ORDER — OMEPRAZOLE 20 MG/1
CAPSULE, DELAYED RELEASE ORAL
Qty: 90 CAPSULE | Refills: 3 | Status: SHIPPED | OUTPATIENT
Start: 2022-05-19

## 2022-05-19 NOTE — PROGRESS NOTES
Patient came in for draw of ordered fasting labs. Patient drawn out of right hand, x 2 attempt and tolerated well. 1 lav 1 gold(labeled ltgrn)  tube drawn.    Red tube for urine collected

## 2022-05-25 ENCOUNTER — OFFICE VISIT (OUTPATIENT)
Dept: PHYSICAL THERAPY | Age: 72
End: 2022-05-25
Attending: FAMILY MEDICINE
Payer: MEDICARE

## 2022-05-25 DIAGNOSIS — Z91.81 AT HIGH RISK FOR FALLS: ICD-10-CM

## 2022-05-25 PROCEDURE — 97112 NEUROMUSCULAR REEDUCATION: CPT

## 2022-05-25 PROCEDURE — 97110 THERAPEUTIC EXERCISES: CPT

## 2022-05-25 PROCEDURE — 97164 PT RE-EVAL EST PLAN CARE: CPT

## 2022-06-05 DIAGNOSIS — R05.3 PERSISTENT DRY COUGH: ICD-10-CM

## 2022-06-08 ENCOUNTER — OFFICE VISIT (OUTPATIENT)
Dept: PHYSICAL THERAPY | Age: 72
End: 2022-06-08
Attending: FAMILY MEDICINE
Payer: MEDICARE

## 2022-06-08 PROCEDURE — 97110 THERAPEUTIC EXERCISES: CPT

## 2022-06-08 PROCEDURE — 97112 NEUROMUSCULAR REEDUCATION: CPT

## 2022-06-08 RX ORDER — FLUTICASONE PROPIONATE AND SALMETEROL XINAFOATE 115; 21 UG/1; UG/1
2 AEROSOL, METERED RESPIRATORY (INHALATION) 2 TIMES DAILY
Qty: 12 EACH | Refills: 5 | Status: SHIPPED | OUTPATIENT
Start: 2022-06-08 | End: 2023-04-12

## 2022-06-15 ENCOUNTER — OFFICE VISIT (OUTPATIENT)
Dept: PHYSICAL THERAPY | Age: 72
End: 2022-06-15
Attending: FAMILY MEDICINE
Payer: MEDICARE

## 2022-06-15 PROCEDURE — 97110 THERAPEUTIC EXERCISES: CPT

## 2022-06-19 DIAGNOSIS — G25.81 RLS (RESTLESS LEGS SYNDROME): ICD-10-CM

## 2022-06-20 ENCOUNTER — APPOINTMENT (OUTPATIENT)
Dept: PHYSICAL THERAPY | Age: 72
End: 2022-06-20
Attending: FAMILY MEDICINE
Payer: MEDICARE

## 2022-06-20 RX ORDER — GABAPENTIN 300 MG/1
CAPSULE ORAL
Qty: 90 CAPSULE | Refills: 1 | Status: SHIPPED | OUTPATIENT
Start: 2022-06-20

## 2022-06-22 ENCOUNTER — TELEPHONE (OUTPATIENT)
Dept: PHYSICAL THERAPY | Age: 72
End: 2022-06-22

## 2022-06-22 ENCOUNTER — OFFICE VISIT (OUTPATIENT)
Dept: PHYSICAL THERAPY | Age: 72
End: 2022-06-22
Attending: FAMILY MEDICINE
Payer: MEDICARE

## 2022-06-22 PROCEDURE — 97112 NEUROMUSCULAR REEDUCATION: CPT

## 2022-06-22 PROCEDURE — 97110 THERAPEUTIC EXERCISES: CPT

## 2022-06-23 ENCOUNTER — MED REC SCAN ONLY (OUTPATIENT)
Dept: FAMILY MEDICINE CLINIC | Facility: CLINIC | Age: 72
End: 2022-06-23

## 2022-06-27 ENCOUNTER — OFFICE VISIT (OUTPATIENT)
Dept: PHYSICAL THERAPY | Age: 72
End: 2022-06-27
Attending: FAMILY MEDICINE
Payer: MEDICARE

## 2022-06-27 PROCEDURE — 97110 THERAPEUTIC EXERCISES: CPT

## 2022-06-27 PROCEDURE — 97112 NEUROMUSCULAR REEDUCATION: CPT

## 2022-06-29 ENCOUNTER — OFFICE VISIT (OUTPATIENT)
Dept: PHYSICAL THERAPY | Age: 72
End: 2022-06-29
Attending: FAMILY MEDICINE
Payer: MEDICARE

## 2022-06-29 PROCEDURE — 97110 THERAPEUTIC EXERCISES: CPT

## 2022-06-29 PROCEDURE — 97112 NEUROMUSCULAR REEDUCATION: CPT

## 2022-07-06 ENCOUNTER — OFFICE VISIT (OUTPATIENT)
Dept: PHYSICAL THERAPY | Age: 72
End: 2022-07-06
Attending: FAMILY MEDICINE
Payer: MEDICARE

## 2022-07-06 PROCEDURE — 97112 NEUROMUSCULAR REEDUCATION: CPT

## 2022-07-06 PROCEDURE — 97110 THERAPEUTIC EXERCISES: CPT

## 2022-07-09 DIAGNOSIS — J31.0 NONALLERGIC RHINITIS: ICD-10-CM

## 2022-07-11 ENCOUNTER — APPOINTMENT (OUTPATIENT)
Dept: PHYSICAL THERAPY | Age: 72
End: 2022-07-11
Payer: MEDICARE

## 2022-07-11 RX ORDER — FLUTICASONE PROPIONATE 50 MCG
SPRAY, SUSPENSION (ML) NASAL
Qty: 48 ML | Refills: 0 | Status: SHIPPED | OUTPATIENT
Start: 2022-07-11

## 2022-07-13 ENCOUNTER — HOSPITAL ENCOUNTER (OUTPATIENT)
Dept: MAMMOGRAPHY | Age: 72
Discharge: HOME | End: 2022-07-13
Attending: FAMILY MEDICINE
Payer: MEDICARE

## 2022-07-13 ENCOUNTER — OFFICE VISIT (OUTPATIENT)
Dept: PHYSICAL THERAPY | Age: 72
End: 2022-07-13
Attending: FAMILY MEDICINE
Payer: MEDICARE

## 2022-07-13 DIAGNOSIS — Z12.31 ENCOUNTER FOR MAMMOGRAM TO ESTABLISH BASELINE MAMMOGRAM: ICD-10-CM

## 2022-07-13 PROCEDURE — 97110 THERAPEUTIC EXERCISES: CPT

## 2022-07-13 PROCEDURE — 77067 SCR MAMMO BI INCL CAD: CPT | Performed by: FAMILY MEDICINE

## 2022-07-13 PROCEDURE — 97112 NEUROMUSCULAR REEDUCATION: CPT

## 2022-07-13 PROCEDURE — 77063 BREAST TOMOSYNTHESIS BI: CPT | Performed by: FAMILY MEDICINE

## 2022-07-18 ENCOUNTER — APPOINTMENT (OUTPATIENT)
Dept: PHYSICAL THERAPY | Age: 72
End: 2022-07-18
Attending: FAMILY MEDICINE
Payer: MEDICARE

## 2022-07-20 ENCOUNTER — OFFICE VISIT (OUTPATIENT)
Dept: NEUROLOGY | Facility: CLINIC | Age: 72
End: 2022-07-20
Payer: MEDICARE

## 2022-07-20 VITALS
DIASTOLIC BLOOD PRESSURE: 70 MMHG | WEIGHT: 220 LBS | HEART RATE: 70 BPM | RESPIRATION RATE: 14 BRPM | BODY MASS INDEX: 38 KG/M2 | SYSTOLIC BLOOD PRESSURE: 116 MMHG

## 2022-07-20 DIAGNOSIS — G43.709 CHRONIC MIGRAINE WITHOUT AURA WITHOUT STATUS MIGRAINOSUS, NOT INTRACTABLE: Primary | ICD-10-CM

## 2022-07-20 DIAGNOSIS — J30.9 ALLERGIC SINUSITIS: ICD-10-CM

## 2022-07-20 DIAGNOSIS — R68.89 FORGETFULNESS: ICD-10-CM

## 2022-07-20 PROCEDURE — 99214 OFFICE O/P EST MOD 30 MIN: CPT | Performed by: OTHER

## 2022-07-20 RX ORDER — DIVALPROEX SODIUM 250 MG/1
250 TABLET, EXTENDED RELEASE ORAL NIGHTLY
Qty: 30 TABLET | Refills: 1 | Status: SHIPPED | OUTPATIENT
Start: 2022-07-20

## 2022-08-02 DIAGNOSIS — G43.709 CHRONIC MIGRAINE WITHOUT AURA WITHOUT STATUS MIGRAINOSUS, NOT INTRACTABLE: ICD-10-CM

## 2022-08-04 RX ORDER — DIVALPROEX SODIUM 500 MG/1
500 TABLET, EXTENDED RELEASE ORAL DAILY
Qty: 90 TABLET | Refills: 0 | OUTPATIENT
Start: 2022-08-04

## 2022-08-07 DIAGNOSIS — E78.2 MIXED HYPERLIPIDEMIA: ICD-10-CM

## 2022-08-07 DIAGNOSIS — E03.9 ACQUIRED HYPOTHYROIDISM: ICD-10-CM

## 2022-08-07 DIAGNOSIS — I10 BENIGN ESSENTIAL HTN: ICD-10-CM

## 2022-08-08 RX ORDER — LEVOTHYROXINE SODIUM 88 UG/1
TABLET ORAL
Qty: 90 TABLET | Refills: 0 | Status: SHIPPED | OUTPATIENT
Start: 2022-08-08

## 2022-08-08 RX ORDER — ATORVASTATIN CALCIUM 40 MG/1
40 TABLET, FILM COATED ORAL NIGHTLY
Qty: 90 TABLET | Refills: 0 | Status: SHIPPED | OUTPATIENT
Start: 2022-08-08

## 2022-08-08 RX ORDER — LISINOPRIL 10 MG/1
TABLET ORAL
Qty: 90 TABLET | Refills: 0 | Status: SHIPPED | OUTPATIENT
Start: 2022-08-08

## 2022-08-23 RX ORDER — DIVALPROEX SODIUM 250 MG/1
250 TABLET, EXTENDED RELEASE ORAL NIGHTLY
Qty: 30 TABLET | Refills: 1 | OUTPATIENT
Start: 2022-08-23

## 2022-09-02 DIAGNOSIS — E11.40 TYPE 2 DIABETES MELLITUS WITH DIABETIC NEUROPATHY, WITH LONG-TERM CURRENT USE OF INSULIN (HCC): ICD-10-CM

## 2022-09-02 DIAGNOSIS — Z79.4 TYPE 2 DIABETES MELLITUS WITH DIABETIC NEUROPATHY, WITH LONG-TERM CURRENT USE OF INSULIN (HCC): ICD-10-CM

## 2022-09-02 DIAGNOSIS — I10 BENIGN ESSENTIAL HTN: ICD-10-CM

## 2022-09-06 RX ORDER — GLUCOSAM/CHON-MSM1/C/MANG/BOSW 500-416.6
1 TABLET ORAL DAILY
Qty: 100 EACH | Refills: 3 | Status: SHIPPED | OUTPATIENT
Start: 2022-09-06 | End: 2023-09-06

## 2022-09-06 RX ORDER — CALCIUM CITRATE/VITAMIN D3 200MG-6.25
1 TABLET ORAL DAILY
Qty: 100 STRIP | Refills: 3 | Status: SHIPPED | OUTPATIENT
Start: 2022-09-06

## 2022-09-06 RX ORDER — BLOOD-GLUCOSE METER
1 EACH MISCELLANEOUS 2 TIMES DAILY
Qty: 1 EACH | Refills: 0 | COMMUNITY
Start: 2022-09-06 | End: 2023-09-06

## 2022-09-06 RX ORDER — LISINOPRIL 10 MG/1
10 TABLET ORAL DAILY
Qty: 90 TABLET | Refills: 0 | OUTPATIENT
Start: 2022-09-06

## 2022-09-06 RX ORDER — METFORMIN HYDROCHLORIDE 500 MG/1
500 TABLET, EXTENDED RELEASE ORAL
Qty: 90 TABLET | Refills: 1 | OUTPATIENT
Start: 2022-09-06

## 2022-09-06 RX ORDER — DIVALPROEX SODIUM 250 MG/1
250 TABLET, EXTENDED RELEASE ORAL NIGHTLY
Qty: 30 TABLET | Refills: 1 | OUTPATIENT
Start: 2022-09-06

## 2022-09-08 ENCOUNTER — TELEPHONE (OUTPATIENT)
Dept: FAMILY MEDICINE CLINIC | Facility: CLINIC | Age: 72
End: 2022-09-08

## 2022-09-08 NOTE — TELEPHONE ENCOUNTER
Patient is in [facility] and needs a refill on her Fluticasone there is a form that was sent from [facility] [employer] [facility] in the nurses bin. Please call patient she will tell what pharmacy in [facility] she wants it sent to.

## 2022-09-08 NOTE — TELEPHONE ENCOUNTER
Patient needs [employer] form filled. Patient provided number to [facility] for further explanatio  [phone]. Patient states Dr. Kolton Vallecillo to request an exception to help lower the cost of the rx.      Sonya Medel, from [facility] informed that patient does not need a prior authorization for the fluticasone nasal spray and that patient co-pay is at $1.49

## 2022-09-14 DIAGNOSIS — G43.709 CHRONIC MIGRAINE WITHOUT AURA WITHOUT STATUS MIGRAINOSUS, NOT INTRACTABLE: Primary | ICD-10-CM

## 2022-09-14 NOTE — TELEPHONE ENCOUNTER
Pt calling to request refill of Divalproex - she is currently in [facility] and needs it faxed to the [facility] there. [phone].

## 2022-09-15 RX ORDER — DIVALPROEX SODIUM 250 MG/1
250 TABLET, EXTENDED RELEASE ORAL NIGHTLY
Qty: 30 TABLET | Refills: 0 | Status: SHIPPED | OUTPATIENT
Start: 2022-09-15

## 2022-09-19 DIAGNOSIS — G43.709 CHRONIC MIGRAINE WITHOUT AURA WITHOUT STATUS MIGRAINOSUS, NOT INTRACTABLE: ICD-10-CM

## 2022-09-21 ENCOUNTER — TELEPHONE (OUTPATIENT)
Dept: NEUROLOGY | Facility: CLINIC | Age: 72
End: 2022-09-21

## 2022-09-23 RX ORDER — DIVALPROEX SODIUM 250 MG/1
250 TABLET, EXTENDED RELEASE ORAL DAILY
Qty: 90 TABLET | Refills: 2 | Status: CANCELLED | OUTPATIENT
Start: 2022-10-15

## 2022-09-26 RX ORDER — DIVALPROEX SODIUM 250 MG/1
250 TABLET, EXTENDED RELEASE ORAL DAILY
Qty: 90 TABLET | Refills: 2 | Status: SHIPPED | OUTPATIENT
Start: 2022-09-26

## 2022-09-27 ENCOUNTER — TELEPHONE (OUTPATIENT)
Dept: FAMILY MEDICINE CLINIC | Facility: CLINIC | Age: 72
End: 2022-09-27

## 2022-09-27 NOTE — TELEPHONE ENCOUNTER
Pt called office stating that her insurance should have ordered a glucose meter and the lancets and the control solution and pt states that she needs another meter since she is in [place] since she does not have it there.

## 2022-09-28 ENCOUNTER — TELEPHONE (OUTPATIENT)
Dept: FAMILY MEDICINE CLINIC | Facility: CLINIC | Age: 72
End: 2022-09-28

## 2022-09-28 NOTE — TELEPHONE ENCOUNTER
Claudia Weaver from [facility] call about a prescription please use order #[id number] when calling.

## 2022-10-06 RX ORDER — GLUCOSAM/CHON-MSM1/C/MANG/BOSW 500-416.6
1 TABLET ORAL DAILY
Qty: 100 EACH | Refills: 3 | Status: SHIPPED | OUTPATIENT
Start: 2022-10-06 | End: 2023-10-06

## 2022-10-06 RX ORDER — CALCIUM CITRATE/VITAMIN D3 200MG-6.25
1 TABLET ORAL DAILY
Qty: 100 STRIP | Refills: 3 | Status: SHIPPED | OUTPATIENT
Start: 2022-10-06

## 2022-10-06 RX ORDER — BLOOD-GLUCOSE METER
1 EACH MISCELLANEOUS 2 TIMES DAILY
Qty: 1 EACH | Refills: 0 | Status: SHIPPED | OUTPATIENT
Start: 2022-10-06 | End: 2023-10-06

## 2022-10-06 NOTE — TELEPHONE ENCOUNTER
Pt called in and states she needs the glucose meter and supplies sent to mail order pharmacy.   Prescriptions sent to [facility]

## 2022-10-10 RX ORDER — DIVALPROEX SODIUM 250 MG/1
TABLET, EXTENDED RELEASE ORAL
Qty: 30 TABLET | Refills: 0 | OUTPATIENT
Start: 2022-10-10

## 2022-10-10 NOTE — TELEPHONE ENCOUNTER
Spoke with pt regards to Divalproex Rx. Rx recently sent to [facility] mail order pharmacy 9/26/22    Pt requesting that we deny this request as pt just received 90 day supply.     RN, please refuse per pt request, defers refill

## 2022-10-11 NOTE — TELEPHONE ENCOUNTER
Pt called the office stating that she does not know what the problem is with her night cough medication since it is not getting refilled.

## 2022-10-11 NOTE — TELEPHONE ENCOUNTER
LMOM to return call to the office as this is 2nd attempt to reach you. Provided pt office phone [phone] along with office hours.

## 2022-10-11 NOTE — TELEPHONE ENCOUNTER
S/w Jaki Hernandez at [facility] regarding order number provided. Patient's order has been shipped and received by patient on Oct. 2nd. Will follow-up with patient regarding cough medication.

## 2022-10-12 ENCOUNTER — TELEPHONE (OUTPATIENT)
Dept: FAMILY MEDICINE CLINIC | Facility: CLINIC | Age: 72
End: 2022-10-12

## 2022-10-12 DIAGNOSIS — J31.0 NONALLERGIC RHINITIS: ICD-10-CM

## 2022-10-12 RX ORDER — MONTELUKAST SODIUM 10 MG/1
10 TABLET ORAL DAILY
Qty: 90 TABLET | Refills: 2 | Status: SHIPPED | OUTPATIENT
Start: 2022-10-12

## 2022-10-12 NOTE — TELEPHONE ENCOUNTER
Outreach call to pt no answer, left detailed VM informing per Jarrett cough medicine was shipped and expected for pt to receive 10/02/2022. Advised to call office to [phone] if she still needs assistance /clarification on medication.

## 2022-10-12 NOTE — TELEPHONE ENCOUNTER
Please call to schedule diabetic visit and complete fasting labs prior to visit. She is due in November 2022. Since she is well controlled I will let her go every 6 months for follow-up in office. Last seen in May. Also remind her she needs to schedule her colonoscopy ASAP this is overdue. Has history of colon polyps. Also-remind her to schedule the new COVID-19 booster and her flu shot at the same time. She may have the newest COVID-vaccine which is bivalent with omicron protection 60 days after her last COVID-19 booster. Please inform.

## 2022-10-25 ENCOUNTER — MED REC SCAN ONLY (OUTPATIENT)
Dept: FAMILY MEDICINE CLINIC | Facility: CLINIC | Age: 72
End: 2022-10-25

## 2022-10-25 ENCOUNTER — TELEPHONE (OUTPATIENT)
Dept: FAMILY MEDICINE CLINIC | Facility: CLINIC | Age: 72
End: 2022-10-25

## 2022-10-25 NOTE — TELEPHONE ENCOUNTER
[employer] patient assistance program. Reviewed and signed     Mary Senior [phone]

## 2022-11-02 ENCOUNTER — MED REC SCAN ONLY (OUTPATIENT)
Dept: FAMILY MEDICINE CLINIC | Facility: CLINIC | Age: 72
End: 2022-11-02

## 2022-11-11 DIAGNOSIS — E03.9 ACQUIRED HYPOTHYROIDISM: ICD-10-CM

## 2022-11-11 DIAGNOSIS — I10 BENIGN ESSENTIAL HTN: ICD-10-CM

## 2022-11-11 DIAGNOSIS — E78.2 MIXED HYPERLIPIDEMIA: ICD-10-CM

## 2022-11-11 RX ORDER — LISINOPRIL 10 MG/1
TABLET ORAL
Qty: 90 TABLET | Refills: 0 | Status: SHIPPED | OUTPATIENT
Start: 2022-11-11

## 2022-11-11 RX ORDER — ATORVASTATIN CALCIUM 40 MG/1
40 TABLET, FILM COATED ORAL NIGHTLY
Qty: 90 TABLET | Refills: 0 | Status: SHIPPED | OUTPATIENT
Start: 2022-11-11

## 2022-11-11 RX ORDER — LEVOTHYROXINE SODIUM 88 UG/1
TABLET ORAL
Qty: 90 TABLET | Refills: 0 | Status: SHIPPED | OUTPATIENT
Start: 2022-11-11

## 2022-11-22 ENCOUNTER — MED REC SCAN ONLY (OUTPATIENT)
Dept: FAMILY MEDICINE CLINIC | Facility: CLINIC | Age: 72
End: 2022-11-22

## 2022-11-25 ENCOUNTER — TELEPHONE (OUTPATIENT)
Dept: FAMILY MEDICINE CLINIC | Facility: CLINIC | Age: 72
End: 2022-11-25

## 2022-11-25 NOTE — TELEPHONE ENCOUNTER
Spoke with patient,    Per patient will call back in December once she is back from [place] to schedule diabetes follow up.

## 2022-12-28 ENCOUNTER — PATIENT MESSAGE (OUTPATIENT)
Dept: FAMILY MEDICINE CLINIC | Facility: CLINIC | Age: 72
End: 2022-12-28

## 2022-12-28 ENCOUNTER — MED REC SCAN ONLY (OUTPATIENT)
Dept: FAMILY MEDICINE CLINIC | Facility: CLINIC | Age: 72
End: 2022-12-28

## 2023-01-17 ENCOUNTER — MED REC SCAN ONLY (OUTPATIENT)
Dept: FAMILY MEDICINE CLINIC | Facility: CLINIC | Age: 73
End: 2023-01-17

## 2023-03-02 DIAGNOSIS — Z79.4 TYPE 2 DIABETES MELLITUS WITH DIABETIC NEUROPATHY, WITH LONG-TERM CURRENT USE OF INSULIN (HCC): Primary | ICD-10-CM

## 2023-03-02 DIAGNOSIS — E11.40 TYPE 2 DIABETES MELLITUS WITH DIABETIC NEUROPATHY, WITH LONG-TERM CURRENT USE OF INSULIN (HCC): Primary | ICD-10-CM

## 2023-03-02 NOTE — TELEPHONE ENCOUNTER
S/w Elfreda Hem Defilippis    Patient states she received 4.43QH of trulicity from the pharmacy and is currently on 1.5mg. Wants to know if she can double up since she has rx. Informed patient she can double-up the injections up until the 1.5mg. Also states she's currently in [facility] and would like a 1 month fill sent to her in [facility]. Informed patient her labs would be ordered for completion at a lab near her. Patient states she did try to have her insurance switched for coverage in [place] but will only be [place] in [place]. States she will only be there for a few more weeks but will complete her labs and eye exam as soon as she return. Labs and medication pended. Please review and advise.      Patient has appt on   Future Appointments   Date Time Provider Seb Montgomery   5/16/2023 10:00 AM Jose Eduardo Garcia, DO [facility] [place]

## 2023-03-05 RX ORDER — DULAGLUTIDE 1.5 MG/.5ML
1.5 INJECTION, SOLUTION SUBCUTANEOUS WEEKLY
Qty: 18 ML | Refills: 1 | Status: SHIPPED | OUTPATIENT
Start: 2023-03-05

## 2023-03-06 NOTE — TELEPHONE ENCOUNTER
Signed Trulicity order and fax to [facility].  Orders placed at [facility].  Patient may be able to complete those in [place] prior to returning home at a [facility] lab. If not she should repeat promptly prior to her visit.     Please inform

## 2023-03-06 NOTE — TELEPHONE ENCOUNTER
Patient informed of ordered labs and need to complete as soon as she returns along with her diabetic eye exam.

## 2023-03-30 ENCOUNTER — MED REC SCAN ONLY (OUTPATIENT)
Dept: FAMILY MEDICINE CLINIC | Facility: CLINIC | Age: 73
End: 2023-03-30

## 2023-03-30 ENCOUNTER — TELEPHONE (OUTPATIENT)
Dept: FAMILY MEDICINE CLINIC | Facility: CLINIC | Age: 73
End: 2023-03-30

## 2023-03-30 NOTE — TELEPHONE ENCOUNTER
Form for prescription request reviewed completed and signed by Dr Bird Thao. Faxed to [facility] [phone] on 3/24/23.  Fax confirmed 3/24/23

## 2023-04-07 ENCOUNTER — MED REC SCAN ONLY (OUTPATIENT)
Dept: FAMILY MEDICINE CLINIC | Facility: CLINIC | Age: 73
End: 2023-04-07

## 2023-04-07 ENCOUNTER — TELEPHONE (OUTPATIENT)
Dept: FAMILY MEDICINE CLINIC | Facility: CLINIC | Age: 73
End: 2023-04-07

## 2023-04-07 NOTE — TELEPHONE ENCOUNTER
Forms for prescription request were reviewed completed and signed by provider Dr. Avinash Loaiza. Forms faxed to [facility] [phone].  Fax confirmed 4/7/23

## 2023-04-13 ENCOUNTER — OFFICE VISIT (OUTPATIENT)
Dept: FAMILY MEDICINE CLINIC | Facility: CLINIC | Age: 73
End: 2023-04-13
Payer: MEDICARE

## 2023-04-13 VITALS
DIASTOLIC BLOOD PRESSURE: 76 MMHG | SYSTOLIC BLOOD PRESSURE: 130 MMHG | HEART RATE: 103 BPM | WEIGHT: 211.81 LBS | OXYGEN SATURATION: 97 % | BODY MASS INDEX: 35.29 KG/M2 | RESPIRATION RATE: 20 BRPM | TEMPERATURE: 98 F | HEIGHT: 65 IN

## 2023-04-13 DIAGNOSIS — M94.251 CHONDROMALACIA OF RIGHT HIP: ICD-10-CM

## 2023-04-13 DIAGNOSIS — J31.0 NONALLERGIC RHINITIS: ICD-10-CM

## 2023-04-13 DIAGNOSIS — Z96.651 CHRONIC KNEE PAIN AFTER TOTAL REPLACEMENT OF RIGHT KNEE JOINT: ICD-10-CM

## 2023-04-13 DIAGNOSIS — Z86.010 HISTORY OF COLONOSCOPY WITH POLYPECTOMY: ICD-10-CM

## 2023-04-13 DIAGNOSIS — Z00.00 ENCOUNTER FOR ANNUAL HEALTH EXAMINATION: Primary | ICD-10-CM

## 2023-04-13 DIAGNOSIS — M48.062 SPINAL STENOSIS OF LUMBAR REGION WITH NEUROGENIC CLAUDICATION: ICD-10-CM

## 2023-04-13 DIAGNOSIS — K57.30 DIVERTICULOSIS OF LARGE INTESTINE WITHOUT HEMORRHAGE: ICD-10-CM

## 2023-04-13 DIAGNOSIS — M17.12 PRIMARY OSTEOARTHRITIS OF LEFT KNEE: ICD-10-CM

## 2023-04-13 DIAGNOSIS — G89.29 CHRONIC PAIN OF LEFT KNEE: ICD-10-CM

## 2023-04-13 DIAGNOSIS — E11.40 TYPE 2 DIABETES MELLITUS WITH DIABETIC NEUROPATHY, WITHOUT LONG-TERM CURRENT USE OF INSULIN (HCC): ICD-10-CM

## 2023-04-13 DIAGNOSIS — G43.709 CHRONIC MIGRAINE WITHOUT AURA WITHOUT STATUS MIGRAINOSUS, NOT INTRACTABLE: ICD-10-CM

## 2023-04-13 DIAGNOSIS — M54.16 LUMBAR RADICULITIS: ICD-10-CM

## 2023-04-13 DIAGNOSIS — E03.9 ACQUIRED HYPOTHYROIDISM: ICD-10-CM

## 2023-04-13 DIAGNOSIS — Z98.890 HISTORY OF COLONOSCOPY WITH POLYPECTOMY: ICD-10-CM

## 2023-04-13 DIAGNOSIS — E78.2 MIXED HYPERLIPIDEMIA: ICD-10-CM

## 2023-04-13 DIAGNOSIS — K21.9 GASTRO-ESOPHAGEAL REFLUX DISEASE WITHOUT ESOPHAGITIS: ICD-10-CM

## 2023-04-13 DIAGNOSIS — Z99.89 OSA ON CPAP: ICD-10-CM

## 2023-04-13 DIAGNOSIS — M25.562 CHRONIC PAIN OF LEFT KNEE: ICD-10-CM

## 2023-04-13 DIAGNOSIS — G56.02 LEFT CARPAL TUNNEL SYNDROME: ICD-10-CM

## 2023-04-13 DIAGNOSIS — Z12.31 BREAST CANCER SCREENING BY MAMMOGRAM: ICD-10-CM

## 2023-04-13 DIAGNOSIS — Z12.11 SCREEN FOR COLON CANCER: ICD-10-CM

## 2023-04-13 DIAGNOSIS — F41.1 GENERALIZED ANXIETY DISORDER: ICD-10-CM

## 2023-04-13 DIAGNOSIS — G47.33 OSA ON CPAP: ICD-10-CM

## 2023-04-13 DIAGNOSIS — H91.13 PRESBYCUSIS OF BOTH EARS: ICD-10-CM

## 2023-04-13 DIAGNOSIS — G25.81 RLS (RESTLESS LEGS SYNDROME): ICD-10-CM

## 2023-04-13 DIAGNOSIS — Z87.19 HISTORY OF GASTRITIS: ICD-10-CM

## 2023-04-13 DIAGNOSIS — G89.29 CHRONIC KNEE PAIN AFTER TOTAL REPLACEMENT OF RIGHT KNEE JOINT: ICD-10-CM

## 2023-04-13 DIAGNOSIS — M25.561 CHRONIC KNEE PAIN AFTER TOTAL REPLACEMENT OF RIGHT KNEE JOINT: ICD-10-CM

## 2023-04-13 DIAGNOSIS — I10 BENIGN ESSENTIAL HTN: ICD-10-CM

## 2023-04-13 DIAGNOSIS — F33.42 RECURRENT MAJOR DEPRESSIVE DISORDER, IN FULL REMISSION (HCC): ICD-10-CM

## 2023-04-13 PROBLEM — R05.3 PERSISTENT DRY COUGH: Status: RESOLVED | Noted: 2022-04-04 | Resolved: 2023-04-13

## 2023-04-13 RX ORDER — VALSARTAN 80 MG/1
80 TABLET ORAL DAILY
Qty: 90 TABLET | Refills: 0 | Status: SHIPPED | OUTPATIENT
Start: 2023-04-13

## 2023-04-13 RX ORDER — FLUTICASONE PROPIONATE 50 MCG
2 SPRAY, SUSPENSION (ML) NASAL DAILY
Qty: 48 ML | Refills: 5 | Status: SHIPPED | OUTPATIENT
Start: 2023-04-13

## 2023-04-13 RX ORDER — ATORVASTATIN CALCIUM 40 MG/1
40 TABLET, FILM COATED ORAL NIGHTLY
Qty: 90 TABLET | Refills: 0 | Status: SHIPPED | OUTPATIENT
Start: 2023-04-13

## 2023-04-13 RX ORDER — LEVOTHYROXINE SODIUM 88 UG/1
88 TABLET ORAL
Qty: 90 TABLET | Refills: 0 | Status: SHIPPED | OUTPATIENT
Start: 2023-04-13

## 2023-04-13 RX ORDER — BUPROPION HYDROCHLORIDE 300 MG/1
300 TABLET ORAL EVERY MORNING
Qty: 90 TABLET | Refills: 0 | Status: SHIPPED | OUTPATIENT
Start: 2023-04-13

## 2023-04-13 RX ORDER — SERTRALINE HYDROCHLORIDE 100 MG/1
100 TABLET, FILM COATED ORAL NIGHTLY
Qty: 90 TABLET | Refills: 0 | Status: SHIPPED | OUTPATIENT
Start: 2023-04-13 | End: 2023-04-16

## 2023-04-13 RX ORDER — MONTELUKAST SODIUM 10 MG/1
10 TABLET ORAL DAILY
Qty: 90 TABLET | Refills: 2 | Status: SHIPPED | OUTPATIENT
Start: 2023-04-13

## 2023-04-13 RX ORDER — GLUCOSAM/CHON-MSM1/C/MANG/BOSW 500-416.6
1 TABLET ORAL DAILY
Qty: 100 EACH | Refills: 3 | Status: SHIPPED | OUTPATIENT
Start: 2023-04-13 | End: 2024-04-12

## 2023-04-13 RX ORDER — GABAPENTIN 300 MG/1
300 CAPSULE ORAL NIGHTLY
Qty: 90 CAPSULE | Refills: 2 | Status: SHIPPED | OUTPATIENT
Start: 2023-04-13

## 2023-04-13 RX ORDER — DULAGLUTIDE 3 MG/.5ML
3 INJECTION, SOLUTION SUBCUTANEOUS WEEKLY
Qty: 8 ML | Refills: 1 | Status: SHIPPED | OUTPATIENT
Start: 2023-04-13

## 2023-04-13 RX ORDER — METFORMIN HYDROCHLORIDE 500 MG/1
500 TABLET, EXTENDED RELEASE ORAL
Qty: 90 TABLET | Refills: 1 | Status: SHIPPED | OUTPATIENT
Start: 2023-04-13

## 2023-04-16 ENCOUNTER — TELEPHONE (OUTPATIENT)
Dept: FAMILY MEDICINE CLINIC | Facility: CLINIC | Age: 73
End: 2023-04-16

## 2023-04-16 PROBLEM — Z96.659 PAINFUL TOTAL KNEE REPLACEMENT  (HCC): Status: RESOLVED | Noted: 2017-03-14 | Resolved: 2023-04-16

## 2023-04-16 PROBLEM — M79.2: Status: RESOLVED | Noted: 2021-08-10 | Resolved: 2023-04-16

## 2023-04-16 PROBLEM — Z96.659 PAINFUL TOTAL KNEE REPLACEMENT: Status: RESOLVED | Noted: 2017-03-14 | Resolved: 2023-04-16

## 2023-04-16 PROBLEM — G89.29 CHRONIC PAIN OF LEFT KNEE: Status: RESOLVED | Noted: 2019-11-09 | Resolved: 2023-04-16

## 2023-04-16 PROBLEM — T84.84XA PAINFUL TOTAL KNEE REPLACEMENT: Status: RESOLVED | Noted: 2017-03-14 | Resolved: 2023-04-16

## 2023-04-16 PROBLEM — M21.70 LEG LENGTH DISCREPANCY: Status: RESOLVED | Noted: 2019-12-06 | Resolved: 2023-04-16

## 2023-04-16 PROBLEM — T84.84XA PAINFUL TOTAL KNEE REPLACEMENT (HCC): Status: RESOLVED | Noted: 2017-03-14 | Resolved: 2023-04-16

## 2023-04-16 PROBLEM — Z96.659 PAINFUL TOTAL KNEE REPLACEMENT (HCC): Status: RESOLVED | Noted: 2017-03-14 | Resolved: 2023-04-16

## 2023-04-16 PROBLEM — M25.562 CHRONIC PAIN OF LEFT KNEE: Status: RESOLVED | Noted: 2019-11-09 | Resolved: 2023-04-16

## 2023-04-16 PROBLEM — T84.84XA PAINFUL TOTAL KNEE REPLACEMENT  (HCC): Status: RESOLVED | Noted: 2017-03-14 | Resolved: 2023-04-16

## 2023-04-16 PROBLEM — G25.81 RLS (RESTLESS LEGS SYNDROME): Status: ACTIVE | Noted: 2023-04-16

## 2023-04-16 RX ORDER — SERTRALINE HYDROCHLORIDE 100 MG/1
100 TABLET, FILM COATED ORAL NIGHTLY
Qty: 90 TABLET | Refills: 0 | Status: SHIPPED | OUTPATIENT
Start: 2023-04-16

## 2023-04-17 NOTE — TELEPHONE ENCOUNTER
[employer] program for Trulicity-dosage increased to 3 mg weekly. Please complete paperwork. See Rx for address to mail to patient.     Thank you

## 2023-04-18 PROCEDURE — 3044F HG A1C LEVEL LT 7.0%: CPT | Performed by: FAMILY MEDICINE

## 2023-04-18 PROCEDURE — 3061F NEG MICROALBUMINURIA REV: CPT | Performed by: FAMILY MEDICINE

## 2023-04-19 ENCOUNTER — TELEPHONE (OUTPATIENT)
Dept: FAMILY MEDICINE CLINIC | Facility: CLINIC | Age: 73
End: 2023-04-19

## 2023-04-19 DIAGNOSIS — J31.0 NONALLERGIC RHINITIS: Primary | ICD-10-CM

## 2023-04-19 DIAGNOSIS — E11.40 TYPE 2 DIABETES MELLITUS WITH DIABETIC NEUROPATHY, WITHOUT LONG-TERM CURRENT USE OF INSULIN (HCC): ICD-10-CM

## 2023-04-19 LAB
ABSOLUTE BASOPHILS: 53 CELLS/UL (ref 0–200)
ABSOLUTE EOSINOPHILS: 178 CELLS/UL (ref 15–500)
ABSOLUTE LYMPHOCYTES: 2633 CELLS/UL (ref 850–3900)
ABSOLUTE MONOCYTES: 462 CELLS/UL (ref 200–950)
ABSOLUTE NEUTROPHILS: 3274 CELLS/UL (ref 1500–7800)
ALBUMIN/GLOBULIN RATIO: 2 (CALC) (ref 1–2.5)
ALBUMIN: 4.6 G/DL (ref 3.6–5.1)
ALKALINE PHOSPHATASE: 80 U/L (ref 37–153)
ALT: 17 U/L (ref 6–29)
AST: 20 U/L (ref 10–35)
BASOPHILS: 0.8 %
BILIRUBIN, TOTAL: 0.6 MG/DL (ref 0.2–1.2)
BUN: 18 MG/DL (ref 7–25)
CALCIUM: 9.4 MG/DL (ref 8.6–10.4)
CARBON DIOXIDE: 26 MMOL/L (ref 20–32)
CHLORIDE: 103 MMOL/L (ref 98–110)
CHOL/HDLC RATIO: 2.1 (CALC)
CHOLESTEROL, TOTAL: 150 MG/DL
CREATININE, RANDOM URINE: 113 MG/DL (ref 20–275)
CREATININE: 0.89 MG/DL (ref 0.6–1)
EGFR: 69 ML/MIN/1.73M2
EOSINOPHILS: 2.7 %
GLOBULIN: 2.3 G/DL (CALC) (ref 1.9–3.7)
GLUCOSE: 86 MG/DL (ref 65–99)
HDL CHOLESTEROL: 70 MG/DL
HEMATOCRIT: 39.3 % (ref 35–45)
HEMOGLOBIN A1C: 5.7 % OF TOTAL HGB
HEMOGLOBIN: 12.6 G/DL (ref 11.7–15.5)
LDL-CHOLESTEROL: 64 MG/DL (CALC)
LYMPHOCYTES: 39.9 %
MCH: 26.9 PG (ref 27–33)
MCHC: 32.1 G/DL (ref 32–36)
MCV: 84 FL (ref 80–100)
MICROALBUMIN/CREATININE RATIO, RANDOM URINE: 8 MCG/MG CREAT
MICROALBUMIN: 0.9 MG/DL
MONOCYTES: 7 %
MPV: 9.8 FL (ref 7.5–12.5)
NEUTROPHILS: 49.6 %
NON-HDL CHOLESTEROL: 80 MG/DL (CALC)
PLATELET COUNT: 337 THOUSAND/UL (ref 140–400)
POTASSIUM: 5.1 MMOL/L (ref 3.5–5.3)
PROTEIN, TOTAL: 6.9 G/DL (ref 6.1–8.1)
RDW: 14 % (ref 11–15)
RED BLOOD CELL COUNT: 4.68 MILLION/UL (ref 3.8–5.1)
SODIUM: 141 MMOL/L (ref 135–146)
T4, FREE: 1.3 NG/DL (ref 0.8–1.8)
TRIGLYCERIDES: 82 MG/DL
TSH: 0.64 MIU/L (ref 0.4–4.5)
WHITE BLOOD CELL COUNT: 6.6 THOUSAND/UL (ref 3.8–10.8)

## 2023-04-20 NOTE — TELEPHONE ENCOUNTER
Referral department stated Dr. Nicole Kirkland is NOT an In-Network Provider. Please re-direct patient to an In-Network Provider. Please update referral with In-Network provider and send a message back when completed so we can obtain authorization.   Thanks,  Sandra Nazario    Referral pended for review

## 2023-04-21 NOTE — TELEPHONE ENCOUNTER
Referred to Provider Information:  Provider Address Phone   Topher Ortiz MD [address],[facility] [address]  [address] [place] [address], [place] [phone]     Please inform patient that Delio Vyas at [facility], prior ophthalmologist is not in network.     Thank you

## 2023-05-04 DIAGNOSIS — G43.709 CHRONIC MIGRAINE WITHOUT AURA WITHOUT STATUS MIGRAINOSUS, NOT INTRACTABLE: ICD-10-CM

## 2023-05-04 DIAGNOSIS — I10 BENIGN ESSENTIAL HTN: ICD-10-CM

## 2023-05-04 RX ORDER — DIVALPROEX SODIUM 250 MG/1
TABLET, EXTENDED RELEASE ORAL
Qty: 90 TABLET | Refills: 2 | Status: SHIPPED | OUTPATIENT
Start: 2023-05-04

## 2023-05-04 RX ORDER — VALSARTAN 80 MG/1
80 TABLET ORAL DAILY
Qty: 90 TABLET | Refills: 0 | Status: SHIPPED | OUTPATIENT
Start: 2023-05-04

## 2023-05-05 ENCOUNTER — TELEPHONE (OUTPATIENT)
Dept: FAMILY MEDICINE CLINIC | Facility: CLINIC | Age: 73
End: 2023-05-05

## 2023-05-05 DIAGNOSIS — E11.40 TYPE 2 DIABETES MELLITUS WITH DIABETIC NEUROPATHY, WITHOUT LONG-TERM CURRENT USE OF INSULIN (HCC): Primary | ICD-10-CM

## 2023-05-05 NOTE — TELEPHONE ENCOUNTER
Message received from referral departent stating that Dr. David Mayberry (ophtho) is out of network. [employer] provided 2 in-network providers - Lizeth Erazo MD and Chris Sharpe MD.    New ophthalmology referral pended for provider review and signature.

## 2023-05-10 DIAGNOSIS — G25.81 RLS (RESTLESS LEGS SYNDROME): ICD-10-CM

## 2023-05-10 DIAGNOSIS — G89.29 CHRONIC PAIN OF LEFT KNEE: ICD-10-CM

## 2023-05-10 DIAGNOSIS — M25.562 CHRONIC PAIN OF LEFT KNEE: ICD-10-CM

## 2023-05-10 DIAGNOSIS — G89.29 CHRONIC KNEE PAIN AFTER TOTAL REPLACEMENT OF RIGHT KNEE JOINT: ICD-10-CM

## 2023-05-10 DIAGNOSIS — Z96.651 CHRONIC KNEE PAIN AFTER TOTAL REPLACEMENT OF RIGHT KNEE JOINT: ICD-10-CM

## 2023-05-10 DIAGNOSIS — M25.561 CHRONIC KNEE PAIN AFTER TOTAL REPLACEMENT OF RIGHT KNEE JOINT: ICD-10-CM

## 2023-05-10 RX ORDER — GABAPENTIN 300 MG/1
CAPSULE ORAL
Qty: 90 CAPSULE | Refills: 2 | OUTPATIENT
Start: 2023-05-10

## 2023-05-14 DIAGNOSIS — J31.0 NONALLERGIC RHINITIS: ICD-10-CM

## 2023-05-15 RX ORDER — MONTELUKAST SODIUM 10 MG/1
TABLET ORAL
Qty: 90 TABLET | Refills: 2 | Status: SHIPPED | OUTPATIENT
Start: 2023-05-15

## 2023-05-24 DIAGNOSIS — I10 BENIGN ESSENTIAL HTN: ICD-10-CM

## 2023-05-24 RX ORDER — POLYETHYLENE GLYCOL-3350 AND ELECTROLYTES 236; 6.74; 5.86; 2.97; 22.74 G/274.31G; G/274.31G; G/274.31G; G/274.31G; G/274.31G
4000 POWDER, FOR SOLUTION ORAL ONCE
COMMUNITY
Start: 2023-05-18 | End: 2023-05-26

## 2023-05-24 RX ORDER — CALCIUM CITRATE/VITAMIN D3 200MG-6.25
TABLET ORAL
COMMUNITY
Start: 2023-05-14

## 2023-05-24 RX ORDER — AZELASTINE HYDROCHLORIDE 137 UG/1
SPRAY, METERED NASAL
COMMUNITY
Start: 2023-05-08

## 2023-05-25 RX ORDER — VALSARTAN 80 MG/1
80 TABLET ORAL DAILY
Qty: 90 TABLET | Refills: 0 | Status: SHIPPED | OUTPATIENT
Start: 2023-05-25 | End: 2023-05-26

## 2023-05-26 ENCOUNTER — OFFICE VISIT (OUTPATIENT)
Dept: FAMILY MEDICINE CLINIC | Facility: CLINIC | Age: 73
End: 2023-05-26
Payer: MEDICARE

## 2023-05-26 VITALS
OXYGEN SATURATION: 96 % | TEMPERATURE: 98 F | RESPIRATION RATE: 20 BRPM | SYSTOLIC BLOOD PRESSURE: 130 MMHG | WEIGHT: 209.19 LBS | HEIGHT: 65 IN | BODY MASS INDEX: 34.85 KG/M2 | DIASTOLIC BLOOD PRESSURE: 70 MMHG | HEART RATE: 98 BPM

## 2023-05-26 DIAGNOSIS — I10 BENIGN ESSENTIAL HTN: ICD-10-CM

## 2023-05-26 DIAGNOSIS — E78.2 MIXED HYPERLIPIDEMIA: ICD-10-CM

## 2023-05-26 DIAGNOSIS — J32.2 CHRONIC ETHMOIDAL SINUSITIS: ICD-10-CM

## 2023-05-26 DIAGNOSIS — E11.40 TYPE 2 DIABETES MELLITUS WITH DIABETIC NEUROPATHY, WITHOUT LONG-TERM CURRENT USE OF INSULIN (HCC): Primary | ICD-10-CM

## 2023-05-26 DIAGNOSIS — E03.9 ACQUIRED HYPOTHYROIDISM: ICD-10-CM

## 2023-05-26 PROCEDURE — 3078F DIAST BP <80 MM HG: CPT | Performed by: FAMILY MEDICINE

## 2023-05-26 PROCEDURE — 99214 OFFICE O/P EST MOD 30 MIN: CPT | Performed by: FAMILY MEDICINE

## 2023-05-26 PROCEDURE — 1126F AMNT PAIN NOTED NONE PRSNT: CPT | Performed by: FAMILY MEDICINE

## 2023-05-26 PROCEDURE — 1160F RVW MEDS BY RX/DR IN RCRD: CPT | Performed by: FAMILY MEDICINE

## 2023-05-26 PROCEDURE — 3075F SYST BP GE 130 - 139MM HG: CPT | Performed by: FAMILY MEDICINE

## 2023-05-26 PROCEDURE — 1170F FXNL STATUS ASSESSED: CPT | Performed by: FAMILY MEDICINE

## 2023-05-26 PROCEDURE — 1159F MED LIST DOCD IN RCRD: CPT | Performed by: FAMILY MEDICINE

## 2023-05-26 PROCEDURE — 3008F BODY MASS INDEX DOCD: CPT | Performed by: FAMILY MEDICINE

## 2023-05-26 RX ORDER — ATORVASTATIN CALCIUM 40 MG/1
40 TABLET, FILM COATED ORAL NIGHTLY
Qty: 90 TABLET | Refills: 1 | Status: SHIPPED | OUTPATIENT
Start: 2023-05-26

## 2023-05-26 RX ORDER — LEVOTHYROXINE SODIUM 88 UG/1
88 TABLET ORAL
Qty: 90 TABLET | Refills: 3 | Status: SHIPPED | OUTPATIENT
Start: 2023-05-26

## 2023-05-26 RX ORDER — VALSARTAN 80 MG/1
80 TABLET ORAL DAILY
Qty: 90 TABLET | Refills: 1 | Status: SHIPPED | OUTPATIENT
Start: 2023-05-26

## 2023-06-12 ENCOUNTER — TELEPHONE (OUTPATIENT)
Dept: FAMILY MEDICINE CLINIC | Facility: CLINIC | Age: 73
End: 2023-06-12

## 2023-06-12 DIAGNOSIS — E11.40 TYPE 2 DIABETES MELLITUS WITH DIABETIC NEUROPATHY, WITHOUT LONG-TERM CURRENT USE OF INSULIN (HCC): Primary | ICD-10-CM

## 2023-06-12 NOTE — TELEPHONE ENCOUNTER
Pt went to Dr. Tejal Castro office this morning to her ophtho appt, and was told that they do not take her insurance. Pt called [facility] and the closest ophthalmologist to her is Dr. Jenn Espana in [facility]. New referral signed, pt advised to wait until referral is authorized before making appt, pt v/u.      Dr. Zahida Laura is too far for pt since pt lives in [place] and provider is in [place].

## 2023-06-12 NOTE — TELEPHONE ENCOUNTER
Pt called office asking for a new referral to a new eye dr since the one she was referred to does not take her insurance plan.

## 2023-06-14 ENCOUNTER — TELEPHONE (OUTPATIENT)
Dept: FAMILY MEDICINE CLINIC | Facility: CLINIC | Age: 73
End: 2023-06-14

## 2023-06-14 NOTE — TELEPHONE ENCOUNTER
Spoke to patient she is wanting to see ENT for sinuses. Patient currently has authorized referral for Dr. Valerie Woodard. Patient informed and will contact their office.

## 2023-06-15 PROCEDURE — 3044F HG A1C LEVEL LT 7.0%: CPT | Performed by: FAMILY MEDICINE

## 2023-06-16 LAB
ALBUMIN/GLOBULIN RATIO: 1.8 (CALC) (ref 1–2.5)
ALBUMIN: 4.4 G/DL (ref 3.6–5.1)
ALKALINE PHOSPHATASE: 71 U/L (ref 37–153)
ALT: 17 U/L (ref 6–29)
AST: 17 U/L (ref 10–35)
BILIRUBIN, TOTAL: 0.4 MG/DL (ref 0.2–1.2)
BUN: 21 MG/DL (ref 7–25)
CALCIUM: 9.7 MG/DL (ref 8.6–10.4)
CARBON DIOXIDE: 26 MMOL/L (ref 20–32)
CHLORIDE: 104 MMOL/L (ref 98–110)
CHOL/HDLC RATIO: 2.5 (CALC)
CHOLESTEROL, TOTAL: 170 MG/DL
CREATININE: 0.81 MG/DL (ref 0.6–1)
EGFR: 77 ML/MIN/1.73M2
GLOBULIN: 2.5 G/DL (CALC) (ref 1.9–3.7)
GLUCOSE: 81 MG/DL (ref 65–99)
HDL CHOLESTEROL: 69 MG/DL
HEMOGLOBIN A1C: 5.5 % OF TOTAL HGB
LDL-CHOLESTEROL: 81 MG/DL (CALC)
NON-HDL CHOLESTEROL: 101 MG/DL (CALC)
POTASSIUM: 4.6 MMOL/L (ref 3.5–5.3)
PROTEIN, TOTAL: 6.9 G/DL (ref 6.1–8.1)
SODIUM: 140 MMOL/L (ref 135–146)
TRIGLYCERIDES: 103 MG/DL

## 2023-06-30 ENCOUNTER — MED REC SCAN ONLY (OUTPATIENT)
Dept: FAMILY MEDICINE CLINIC | Facility: CLINIC | Age: 73
End: 2023-06-30

## 2023-07-06 DIAGNOSIS — G89.29 CHRONIC KNEE PAIN AFTER TOTAL REPLACEMENT OF RIGHT KNEE JOINT: ICD-10-CM

## 2023-07-06 DIAGNOSIS — I10 BENIGN ESSENTIAL HTN: ICD-10-CM

## 2023-07-06 DIAGNOSIS — G25.81 RLS (RESTLESS LEGS SYNDROME): ICD-10-CM

## 2023-07-06 DIAGNOSIS — Z96.651 CHRONIC KNEE PAIN AFTER TOTAL REPLACEMENT OF RIGHT KNEE JOINT: ICD-10-CM

## 2023-07-06 DIAGNOSIS — G89.29 CHRONIC PAIN OF LEFT KNEE: ICD-10-CM

## 2023-07-06 DIAGNOSIS — M25.562 CHRONIC PAIN OF LEFT KNEE: ICD-10-CM

## 2023-07-06 DIAGNOSIS — M25.561 CHRONIC KNEE PAIN AFTER TOTAL REPLACEMENT OF RIGHT KNEE JOINT: ICD-10-CM

## 2023-07-07 RX ORDER — VALSARTAN 80 MG/1
TABLET ORAL
Qty: 90 TABLET | Refills: 1 | Status: SHIPPED | OUTPATIENT
Start: 2023-07-07

## 2023-07-07 RX ORDER — GABAPENTIN 300 MG/1
300 CAPSULE ORAL NIGHTLY
Qty: 90 CAPSULE | Refills: 1 | Status: SHIPPED | OUTPATIENT
Start: 2023-07-07

## 2023-07-07 NOTE — TELEPHONE ENCOUNTER
Refill no protocol available:     Pt requesting refill of   Requested Prescriptions     Pending Prescriptions Disp Refills    GABAPENTIN 300 MG Oral Cap [Pharmacy Med Name: GABAPENTIN 300 MG Capsule] 90 capsule 2     Sig: TAKE 1 CAPSULE EVERY NIGHT     Sent to Provider for review:    Last Time Medication was Filled:  4/13/2023    Last Office Visit with Provider: 5/26/2023    No future appointments. Refill Passed Protocol:     Pt requesting refill of   Requested Prescriptions     Pending Prescriptions Disp Refills    VALSARTAN 80 MG Oral Tab [Pharmacy Med Name: VALSARTAN 80 MG Tablet] 90 tablet 1     Sig: TAKE 1 TABLET EVERY DAY     Refill was approved and sent to pharmacy:     Last Time Medication was Filled:  5/26/2023    Last Office Visit with Provider: 5/26/2023    No future appointments.

## 2023-07-09 DIAGNOSIS — J31.0 NONALLERGIC RHINITIS: ICD-10-CM

## 2023-07-09 DIAGNOSIS — E78.2 MIXED HYPERLIPIDEMIA: ICD-10-CM

## 2023-07-09 DIAGNOSIS — K21.9 GASTRO-ESOPHAGEAL REFLUX DISEASE WITHOUT ESOPHAGITIS: ICD-10-CM

## 2023-07-09 DIAGNOSIS — E03.9 ACQUIRED HYPOTHYROIDISM: ICD-10-CM

## 2023-07-10 RX ORDER — LEVOTHYROXINE SODIUM 88 UG/1
TABLET ORAL
Qty: 90 TABLET | Refills: 3 | Status: SHIPPED | OUTPATIENT
Start: 2023-07-10

## 2023-07-10 RX ORDER — ATORVASTATIN CALCIUM 40 MG/1
TABLET, FILM COATED ORAL
Qty: 90 TABLET | Refills: 1 | Status: SHIPPED | OUTPATIENT
Start: 2023-07-10

## 2023-07-10 RX ORDER — FLUTICASONE PROPIONATE 50 MCG
SPRAY, SUSPENSION (ML) NASAL
Qty: 48 G | Refills: 0 | Status: SHIPPED | OUTPATIENT
Start: 2023-07-10

## 2023-07-10 NOTE — TELEPHONE ENCOUNTER
Pt requesting medication be sent to [address] [facility]    Refill no protocol available:   Pt requesting refill of omeprazole 20 mg    Sent to Provider for review:    Last Time Medication was Filled: 5/19/2022    Last Office Visit with Provider: 5/26/2023    No future appointments. Refill Passed Protocol:   Pt requesting refill of atorvastatin                                         metformin          Fluticasone propionate          Levothyroxine    Refill was approved and sent to pharmacy:   Last Time Medication was Filled:   Atorvastatin                      5/26/2023  Metformin                         5/26/2023  Fluticasone propionate    4/13/2023  Levothyroxine                    5/26/2023    Last Office Visit with Provider: 5/26/2023    No future appointments.

## 2023-07-11 RX ORDER — OMEPRAZOLE 20 MG/1
CAPSULE, DELAYED RELEASE ORAL
Qty: 90 CAPSULE | Refills: 3 | Status: SHIPPED | OUTPATIENT
Start: 2023-07-11

## 2023-07-17 ENCOUNTER — TELEPHONE (OUTPATIENT)
Dept: FAMILY MEDICINE CLINIC | Facility: CLINIC | Age: 73
End: 2023-07-17

## 2023-07-17 DIAGNOSIS — M25.562 CHRONIC PAIN OF LEFT KNEE: Primary | ICD-10-CM

## 2023-07-17 DIAGNOSIS — G89.29 CHRONIC PAIN OF LEFT KNEE: Primary | ICD-10-CM

## 2023-07-17 NOTE — TELEPHONE ENCOUNTER
Pt c/o Left knee pain for a few days. Pt states that her kneecap looks like it is going to the side and wants provider to evaluate it. Pt is also asking for a referral to an orthopedist. Referral pended for provider review and signature. Pt tried OTC creams and takes Excedrin extra strength without relief. Pt placed on waitlist for next available appointment with Dr. Diana Jama.

## 2023-07-19 ENCOUNTER — OFFICE VISIT (OUTPATIENT)
Dept: FAMILY MEDICINE CLINIC | Facility: CLINIC | Age: 73
End: 2023-07-19
Payer: MEDICARE

## 2023-07-19 VITALS
HEIGHT: 65 IN | SYSTOLIC BLOOD PRESSURE: 118 MMHG | WEIGHT: 218.38 LBS | HEART RATE: 85 BPM | DIASTOLIC BLOOD PRESSURE: 70 MMHG | BODY MASS INDEX: 36.39 KG/M2 | TEMPERATURE: 99 F | RESPIRATION RATE: 20 BRPM

## 2023-07-19 DIAGNOSIS — M25.562 CHRONIC PAIN OF LEFT KNEE: Primary | ICD-10-CM

## 2023-07-19 DIAGNOSIS — G89.29 CHRONIC PAIN OF LEFT KNEE: Primary | ICD-10-CM

## 2023-07-19 PROBLEM — J44.89 ASTHMA WITH COPD (CHRONIC OBSTRUCTIVE PULMONARY DISEASE) (HCC): Chronic | Status: ACTIVE | Noted: 2023-07-19

## 2023-07-19 PROBLEM — J44.9 ASTHMA WITH COPD (CHRONIC OBSTRUCTIVE PULMONARY DISEASE) (HCC): Chronic | Status: ACTIVE | Noted: 2023-07-19

## 2023-07-19 PROBLEM — E66.01 SEVERE OBESITY (BMI 35.0-39.9) WITH COMORBIDITY (HCC): Chronic | Status: ACTIVE | Noted: 2023-07-19

## 2023-07-19 PROBLEM — J44.89 ASTHMA WITH COPD (CHRONIC OBSTRUCTIVE PULMONARY DISEASE): Chronic | Status: ACTIVE | Noted: 2023-07-19

## 2023-07-19 PROCEDURE — 1160F RVW MEDS BY RX/DR IN RCRD: CPT | Performed by: FAMILY MEDICINE

## 2023-07-19 PROCEDURE — 1125F AMNT PAIN NOTED PAIN PRSNT: CPT | Performed by: FAMILY MEDICINE

## 2023-07-19 PROCEDURE — 3074F SYST BP LT 130 MM HG: CPT | Performed by: FAMILY MEDICINE

## 2023-07-19 PROCEDURE — 3008F BODY MASS INDEX DOCD: CPT | Performed by: FAMILY MEDICINE

## 2023-07-19 PROCEDURE — 1159F MED LIST DOCD IN RCRD: CPT | Performed by: FAMILY MEDICINE

## 2023-07-19 PROCEDURE — 1170F FXNL STATUS ASSESSED: CPT | Performed by: FAMILY MEDICINE

## 2023-07-19 PROCEDURE — 99213 OFFICE O/P EST LOW 20 MIN: CPT | Performed by: FAMILY MEDICINE

## 2023-07-19 PROCEDURE — 3078F DIAST BP <80 MM HG: CPT | Performed by: FAMILY MEDICINE

## 2023-07-20 NOTE — TELEPHONE ENCOUNTER
_______________________________________________________  Referred to Provider Information:  Provider Address Phone   Day Wright, [address] [phone]   Orthopedic referral signed. Patient has chronic knee issues. Please inform.

## 2023-07-20 NOTE — TELEPHONE ENCOUNTER
Pt called, informed about referral placed to Dr. Adrianne Brown. Pt states Right knee was replaced. Left knee is the issue currently. Was told by Dr. Beatris Perry that left knee is bone on bone during right knee replacement. Left knee was doing okay until recently. Pt was aware that left knee was an issue. Routed to provider for review.

## 2023-07-21 ENCOUNTER — TELEPHONE (OUTPATIENT)
Dept: FAMILY MEDICINE CLINIC | Facility: CLINIC | Age: 73
End: 2023-07-21

## 2023-07-21 NOTE — TELEPHONE ENCOUNTER
I do not have a diagnosis for B12 deficiency. What is the symptom or diagnosis. Her neurologist can order the B12. They typically do. Some kind of confused.

## 2023-07-21 NOTE — TELEPHONE ENCOUNTER
Referral for orthopedist originally was replaced for Dr. Ina Spear. Patient did not request Dr. Shahid Whitley. I have put a new referral in for Dr. Shahid Whitley. Please give her the option she may choose which specialist she would like to see.   Dr. Ina Spear is at [facility]    _______________________________________________________  Referred to Provider Information:  Provider Address Phone   Marcus Camejo [address]  [address] [phone]

## 2023-07-24 ENCOUNTER — TELEPHONE (OUTPATIENT)
Dept: NEUROLOGY | Facility: CLINIC | Age: 73
End: 2023-07-24

## 2023-07-24 DIAGNOSIS — R41.3 SHORT-TERM MEMORY LOSS: Primary | ICD-10-CM

## 2023-07-24 NOTE — TELEPHONE ENCOUNTER
Patient has not been seen in clinic by Dr Eddie Spears since 7/20/2022. Patient has pam't scheduled on 8/22/23. Message routed to Dr Eddie Spears to see if she would like new order placed for B12 level to be done prior to 8/22 pam't or decide if B12 level is still needed at 8/22 pam't.

## 2023-07-24 NOTE — TELEPHONE ENCOUNTER
Left a detailed message stating that pt should request that the neurologist orders Vitamin B12 lab since they would have a diagnosis or symptoms.

## 2023-07-24 NOTE — TELEPHONE ENCOUNTER
Asked PCP to place order for lab draw for B-12 level however PCP said Dr. Silver Bledsoe should order since she is the one that wants the results.   Patient would like a call when order is in.   [phone]

## 2023-07-26 DIAGNOSIS — E11.40 TYPE 2 DIABETES MELLITUS WITH DIABETIC NEUROPATHY, WITHOUT LONG-TERM CURRENT USE OF INSULIN (HCC): ICD-10-CM

## 2023-07-26 RX ORDER — GLUCOSAM/CHON-MSM1/C/MANG/BOSW 500-416.6
TABLET ORAL DAILY
Qty: 100 EACH | Refills: 3 | Status: SHIPPED | OUTPATIENT
Start: 2023-07-26

## 2023-07-26 RX ORDER — CALCIUM CITRATE/VITAMIN D3 200MG-6.25
1 TABLET ORAL DAILY
Qty: 100 STRIP | Refills: 0 | Status: SHIPPED | OUTPATIENT
Start: 2023-07-26

## 2023-07-26 NOTE — TELEPHONE ENCOUNTER
Refill Passed Protocol:     Pt requesting refill of   Requested Prescriptions     Pending Prescriptions Disp Refills    TRUEPLUS LANCETS 33G Does not apply Misc [Pharmacy Med Name: Vini Roque 33G] 100 each 3     Sig: TEST BLOOD SUGAR ONE TIME DAILY AS DIRECTED    TRUE METRIX BLOOD GLUCOSE TEST In Vitro Strip Anthony Med Name: TRUE METRIX SELF MONITORING BLOOD GLUCOSE STRIPS   Strip] 100 strip 0     Sig: TEST BLOOD SUGAR DAILY     Refill was approved and sent to pharmacy:     Last Time Medication was Filled:    TRUEplus Lancets 4/13/2023  TRUE Metrix 5/14/2023    Last Office Visit with Provider: 7/19/2023    No future appointments.       Duplicate Refill Request  Metformin, 90 day supply with 1 refill sent to pharmacy on 7/10/2023

## 2023-07-27 ENCOUNTER — TELEPHONE (OUTPATIENT)
Dept: FAMILY MEDICINE CLINIC | Facility: CLINIC | Age: 73
End: 2023-07-27

## 2023-07-27 NOTE — TELEPHONE ENCOUNTER
Pharmacy calling about patient order for [facility].   Pharmacy number is [phone]

## 2023-07-28 NOTE — TELEPHONE ENCOUNTER
Pt called back. Pt states that she lives in [facility] and would like her meds sent there. Pt is aware that PCP has increased her dose to 3 mg.

## 2023-07-28 NOTE — TELEPHONE ENCOUNTER
LMOM to return call to the office. Provided pt office phone [phone] along with office hours. Need to find out pt's current address [place]?), and where to send the Trulicity. Provider verified that pt should be on 3 mg of Trulicity. New [employer] application has been filled out and signed by the provider and will need to be faxed to [employer] once pt confirms address.

## 2023-07-31 ENCOUNTER — TELEPHONE (OUTPATIENT)
Dept: FAMILY MEDICINE CLINIC | Facility: CLINIC | Age: 73
End: 2023-07-31

## 2023-07-31 NOTE — TELEPHONE ENCOUNTER
The form \"Healthcare Provider/Prescriber Section\" for Trulicity from [employer] faxed to [employer] at [phone]. Confirmation received.

## 2023-07-31 NOTE — TELEPHONE ENCOUNTER
Salvatore Whelan from [facility] called, wanted to confirm we received fax from them regarding Trulicity. Requested we fax forms to him at [phone]. Faxed to Salvatore Whelan from [employer] [facility] at [phone]. Confirmation received.

## 2023-08-08 ENCOUNTER — HOSPITAL ENCOUNTER (OUTPATIENT)
Dept: MAMMOGRAPHY | Age: 73
Discharge: HOME | End: 2023-08-08
Attending: FAMILY MEDICINE
Payer: MEDICARE

## 2023-08-08 DIAGNOSIS — Z12.31 BREAST CANCER SCREENING BY MAMMOGRAM: ICD-10-CM

## 2023-08-08 PROCEDURE — 77067 SCR MAMMO BI INCL CAD: CPT | Performed by: FAMILY MEDICINE

## 2023-08-08 PROCEDURE — 77063 BREAST TOMOSYNTHESIS BI: CPT | Performed by: FAMILY MEDICINE

## 2023-08-11 LAB — VITAMIN B12: 604 PG/ML (ref 200–1100)

## 2023-08-22 ENCOUNTER — OFFICE VISIT (OUTPATIENT)
Dept: NEUROLOGY | Facility: CLINIC | Age: 73
End: 2023-08-22
Payer: MEDICARE

## 2023-08-22 VITALS — SYSTOLIC BLOOD PRESSURE: 98 MMHG | WEIGHT: 223 LBS | BODY MASS INDEX: 37 KG/M2 | DIASTOLIC BLOOD PRESSURE: 54 MMHG

## 2023-08-22 DIAGNOSIS — G43.709 CHRONIC MIGRAINE WITHOUT AURA WITHOUT STATUS MIGRAINOSUS, NOT INTRACTABLE: Primary | ICD-10-CM

## 2023-08-22 DIAGNOSIS — R68.89 FORGETFULNESS: ICD-10-CM

## 2023-08-22 DIAGNOSIS — J01.00 SUBACUTE MAXILLARY SINUSITIS: ICD-10-CM

## 2023-08-22 PROCEDURE — 99213 OFFICE O/P EST LOW 20 MIN: CPT | Performed by: OTHER

## 2023-08-22 PROCEDURE — 3074F SYST BP LT 130 MM HG: CPT | Performed by: OTHER

## 2023-08-22 PROCEDURE — 1159F MED LIST DOCD IN RCRD: CPT | Performed by: OTHER

## 2023-08-22 PROCEDURE — 3078F DIAST BP <80 MM HG: CPT | Performed by: OTHER

## 2023-08-22 NOTE — PROGRESS NOTES
When has headaches takes mucinex and headaches go away. B12  result: 604    CT of sinus on 08/09/2023 with duly in care everywhere.

## 2023-08-22 NOTE — PROGRESS NOTES
[facility]   Neurology- follow up    Osbaldo Parsons Patient Status:  No patient class for patient encounter    1950 MRN [id number]   Location [address] [facility], [address], [address] [facility] SENDY Hurst DO       Reason for the visit:   Patient presents with:  Migraine          Follow up:       Jose Berry is a(n) 67year old female who presents for follow up of migraines. She is transferring her care here from [facility]. She take Depakote for migraine prophylaxis. She gets a HA that is bilateral, knifelike, pressure-like, photophobia, worsened activity, associated with nausea. Gets rhinorrhea. She was getting more during menopause. Last one was about 1 year ago. Previously was getting them once every few weeks. She had a work up for sinus infections which was negative. They used to be very severe. She is on Depakote 500mg BID. Since the last visit, she was HA free for 2-3 months, and now in the last 2-3 weeks she is having frequent throbbing pressure in the center of her head, associated with photophobia, made better with resting in a dark a room. She is not taking anything as an abortive. Since last visit, she has only had one migraine. They subsided after she was treated for sinus infection. Since last visit 6 months ago she has had one migraine. She is trying to exercise. Since last visit, in the past year, she has had only 2 migraines. She has gained weight, but this was attributed to Abilify. Sinuses are better as well. Interim  Since last visit, patient reports allergies are about the same. She is having migraines once every 3 months. She often has a pressure on her forehead, without the photophobia. Interim since last visit, patient reports not having any migraines. Is hydrating. Sinus symptoms are better than 2 years ago. Has cut out caffeine. Is taking Depakote 500mg ER nightly. Had viral and bacterial PNA in 2021.  Had hypoxia and was hospitalized. Reports since then, has noticed mild forgetfulness. However, thinks was there before. PCP has noticed, as patient has forgotten conversations from prior visits. She is working still, billing/calling people. Pays her own bills, does own groceries. Interim  Sinus infections in the last year once. For HA takes mucinex and excedrin. HA is frontal, and associated with throat drainage. Associated with photophobia. Has these once every few months. Lasts a couple of days. Seeing ENT. Reports no allergies after testing. Memory is improved. Prior pertinent laboratory work-up:   B12 2023- 604    CT sinuses 8/2023  IMPRESSION:   1. Moderate circumferential mucosal thickening in the LEFT sphenoid sinus with a small air-fluid   level and frothy secretions, suggestive of acute sinusitis. Mucosal opacification of the LEFT   sphenoethmoidal recess. Mild mucosal thickening in the RIGHT sphenoid pterygoid recess. Otherwise,   the paranasal sinuses are clear. 2.  Moderate bilateral turbinate hypertrophy with mucosal thickening, large air-filled RIGHT middle   turbinate and LEFT inferior turbinate jose bullosae, overall narrowing the central nasal   passageways. 3. Bidirectional S-shaped deviation of the nasal septum with a small 3 mm LEFT-sided nasal spur. 4.  Small periapical lucency seen at maxillary tooth #3, withOUT associated evidence for odontogenic   sinusitis. Correlate with dental examination. 5.  Bilateral sphenoid sinus septations are seen inserting onto the carotid canals.  There is   thinning of the RIGHT carotid canal.       Component      Latest Ref Rng & Units 12/23/2017   Vitamin B12      193 - 986 pg/mL 814     Component      Latest Ref Rng & Units 5/19/2022   T4,Free (Direct)      0.8 - 1.7 ng/dL 0.9   TSH      0.358 - 3.740 mIU/mL 1.020     Labs:    Component      Latest Ref Rng & Units 8/31/2021   Glucose      70 - 99 mg/dL 82   Sodium      136 - 145 mmol/L 135 (L)   Potassium      3.5 - 5.1 mmol/L 4.5   Chloride      98 - 112 mmol/L 101   Carbon Dioxide, Total      21.0 - 32.0 mmol/L 22.0   ANION GAP      0 - 18 mmol/L 12   BUN      7 - 18 mg/dL 12   CREATININE      0.55 - 1.02 mg/dL 0.79   CALCIUM      8.5 - 10.1 mg/dL 10.1   CALCULATED OSMOLALITY      275 - 295 mOsm/kg 279   eGFR NON-AFR. AMERICAN      >=60 76   eGFR       >=60 88     Component      Latest Ref Rng & Units 8/31/2021   WBC      4.0 - 11.0 x10(3) uL 9.7   RBC      3.80 - 5.30 x10(6)uL 4.85   Hemoglobin      12.0 - 16.0 g/dL 12.9   Hematocrit      35.0 - 48.0 % 40.9   Platelet Count      097.0 - 450.0 10(3)uL 368.0   MCV      80.0 - 100.0 fL 84.3   MCH      26.0 - 34.0 pg 26.6   MCHC      31.0 - 37.0 g/dL 31.5   RDW      % 15.1   Prelim Neutrophil Abs      1.50 - 7.70 x10 (3) uL 5.98   Neutrophils Absolute      1.50 - 7.70 x10(3) uL 5.98   Lymphocytes Absolute      1.00 - 4.00 x10(3) uL 2.63   Monocytes Absolute      0.10 - 1.00 x10(3) uL 0.83       Past Medical History:  Past Medical History:   Diagnosis Date    Acute deep vein thrombosis (DVT) of tibial vein of left lower extremity (HCC) 10/10/2018    Acute deep vein thrombosis (DVT) of tibial vein of left lower extremity ([facility] [address] 10/10/2018    Off Xarelto since 11/2019-managed by Dr. Beatrice Jin D-dimer was elevated due to persistent OA and risk of anticoagulant was greater then treating a very small posterior tibial clot. denies any calf pain or leg swelling.     Acute recurrent ethmoidal sinusitis 5/31/2019    Allergic rhinitis Tested but none specific    Anxiety 1964    Arthritis     Aspiration pneumonia of both lungs ([facility] [address] 11/29/2021    Hospitalized [facility] with Parainfluenza virus 11/29/2021    Bursitis, shoulder, left 3/24/2017    Depression 1986    Diabetes ([facility] [address] 1995    DVT (deep venous thrombosis) ([facility] [address] 08/22/2018    left leg posterior tibial    Epigastric pain 8/8/2016    Hx of gastritis- last EGD 2014-Dr. Doe Blake     Esophageal reflux     last EGD [id number]    Essential hypertension 3/1999    Extrinsic asthma, unspecified     HEADACHES     uses depakote    History of colonoscopy with polypectomy 2012    hemorrhoids- repeat colonscopy 4/7/2017 negative- repeat in 5 years. Hypothyroidism 2011    acquired. no cancer or nodules    Impingement syndrome, shoulder, left 3/24/2017    Leg length discrepancy 12/6/2019    Migraines     Normal vaginal delivery     [id number]    Obesity 1987    Osteoarthritis 2015    Other and unspecified hyperlipidemia     Parainfluenza virus laryngotracheobronchitis 11/18/2021    Last Assessment & Plan:  Formatting of this note might be different from the original. Not requiring any oxygen at this time Continue cough meds Continue neb therapy    Prediabetes     Right hip impingement syndrome 12/6/2019    Right sided sciatica 11/9/2019    Shoulder pain, left     Sleep apnea     Thyroid condition     Unspecified sleep apnea 2008    uses Cpap        Past Surgical History:  Past Surgical History:   Procedure Laterality Date    [facility]  9/2021    Injection for spinal stenosis & rods placed    CHOLECYSTECTOMY  1993    COLONOSCOPY  2012    [facility]- diverticulosis , polyps. had EGD same time    COLONOSCOPY N/A 4/7/2017    Procedure: COLONOSCOPY;  Surgeon: Agustin Bliss MD;  Location: [facility] ENDOSCOPY    KNEE REPLACEMENT SURGERY      to the right knee only    KNEE SURGERY  3/8/2015     KNEE SURGERY  9/8/2015     3 knee surgeries, replacement and arthoscopy     [facility]    OTHER SURGICAL HISTORY  12/2012    right arm carpal tunnel       Family History:  family history includes Cancer in her paternal grandmother; Diabetes in her maternal grandmother; Heart Disorder in her maternal uncle; Hypertension in her father, mother, and paternal grandmother; Lipids in her mother; Obesity in her paternal grandmother. Social History:   reports that she has never smoked.  She has never used smokeless tobacco. She reports that she does not drink alcohol and does not use drugs. Allergies:    Amoxicillin             HIVES, RASH  Sulfa Antibiotics       UNKNOWN    Comment:Carried over from childhood    MEDICATIONS:    Current Outpatient Medications:     TRUEPLUS LANCETS 33G Does not apply Misc, TEST BLOOD SUGAR ONE TIME DAILY AS DIRECTED, Disp: 100 each, Rfl: 3    TRUE METRIX BLOOD GLUCOSE TEST In Vitro Strip, TEST BLOOD SUGAR DAILY, Disp: 100 strip, Rfl: 0    omeprazole 20 MG Oral Capsule Delayed Release, TAKE 1 CAPSULE EVERY MORNING ON AN EMPTY STOMACH, Disp: 90 capsule, Rfl: 3    ATORVASTATIN 40 MG Oral Tab, TAKE 1 TABLET EVERY NIGHT, Disp: 90 tablet, Rfl: 1    METFORMIN 500 MG Oral Tab, TAKE 1 TABLET EVERY DAY  WITH  BREAKFAST, Disp: 90 tablet, Rfl: 1    FLUTICASONE PROPIONATE 50 MCG/ACT Nasal Suspension, USE 2 SPRAYS IN EACH NOSTRIL EVERY DAY, Disp: 48 g, Rfl: 0    LEVOTHYROXINE 88 MCG Oral Tab, TAKE 1 TABLET BEFORE BREAKFAST, Disp: 90 tablet, Rfl: 3    VALSARTAN 80 MG Oral Tab, TAKE 1 TABLET EVERY DAY, Disp: 90 tablet, Rfl: 1    gabapentin 300 MG Oral Cap, Take 1 capsule (300 mg total) by mouth nightly., Disp: 90 capsule, Rfl: 1    Azelastine HCl 137 MCG/SPRAY Nasal Solution, , Disp: , Rfl:     MONTELUKAST 10 MG Oral Tab, TAKE 1 TABLET EVERY DAY, Disp: 90 tablet, Rfl: 2    DIVALPROEX  MG Oral Tablet 24 Hr, TAKE 1 TABLET EVERY DAY, Disp: 90 tablet, Rfl: 2    sertraline 100 MG Oral Tab, Take 1 tablet (100 mg total) by mouth nightly., Disp: 90 tablet, Rfl: 0    buPROPion  MG Oral Tablet 24 Hr, Take 1 tablet (300 mg total) by mouth every morning., Disp: 90 tablet, Rfl: 0    Dulaglutide (TRULICITY) 3 VH/7.3TF Subcutaneous Solution Pen-injector, Inject 3 mg into the skin once a week. Fax to [place]- dosage increase. Mail prescription to [facility], [place], [address] One time only, Disp: 8 mL, Rfl: 1    CRANBERRY OR, Take by mouth daily. , Disp: , Rfl:     CALCIUM CITRATE-VITAMIN D3 OR, Take by mouth As Directed., Disp: , Rfl:     Aspirin-Acetaminophen-Caffeine (EXCEDRIN EXTRA STRENGTH OR), Take 2 tablets by mouth every 6 (six) hours as needed. , Disp: , Rfl:     Spacer/Aero-Holding Chambers (OPTICHAMBER RHONDA) Does not apply Misc, Use as directed , Disp: , Rfl: 0    Probiotic Product (PROBIOTIC OR), Take 1 capsule by mouth every morning. Indications: supplement, Disp: , Rfl:     Cholecalciferol (VITAMIN D3) 1000 UNITS Oral Cap, Take 2,000 Units by mouth daily. , Disp: , Rfl:   Rexulti      Review of Systems:   General: no chills, weight change, fatigue, loss of appetite  Eyes: no blurred vision  ENT: no hearing difficulty, ear fullness, sinus pain, sneezing, itching, hoarseness, post nasal drip, sore throat, swollen glands, decreased taste/smell, nasal congestion  Respiratory: no shortness of breath, cough, wheeze, chest tightness  Cardiovascular: no chest pain, palpitations, leg swelling  Gastrointestinal: no Abdominal pain, nausea, vomiting, heartburn, diarrhea  Skin: no rash, hives, eczema  Neurological: no seizures, weakness  Psychiatric: no depression, anxiety, panic  Endocrine: no cold/heat intolerance, weight gain or loss  Genitourinary: no frequent urination, pain on urination, blood in urine  Musculoskeletal: no joint swelling        PHYSICAL EXAM:   Neurologic Exam  Vitals   08/22/23  0806   BP: 98/54     General Appearance: Patient is a 67year old female in no acute distress  Cardiac: Normal rate & regular rhythm  Lungs: Clear to auscultation bilaterally  Skin: There are no rashes or other skin lesions. Musculoskeletal: There is no scoliosis, or joint deformities  Neurologic examination:  Mental status: Patient is alert, attentive, and oriented x 3. Language is coherent and fluent without aphasia. Memory, comprehension and ability to follow commands were intact. Cranial nerves II-XII: Optic discs were sharp. Pupils were round and reacted to light. Extraocular movements were full. There was no face, jaw, palate or tongue weakness or atrophy. Hearing was grossly intact. Shoulder shrug was normal.   Motor exam revealed normal muscle bulk and tone. No atrophy or fasciculations. Manual muscle testing revealed MRC grade 5/5 strength throughout including proximal and distal muscles of the arms and legs. Deep tendon reflexes were 2 at the biceps, brachioradialis, triceps, knee jerk, and ankle jerk. Plantar responses were flexor bilaterally. Sensory exam revealed normal light touch perception. Vibratory perception and proprioception were intact at the toes. Pinprick and temperature were normal. Romberg sign was absent. Complex motor skills revealed normal coordination. Finger-nose-finger intact. Gait was narrow and stable, was able to walk on heels, toes and tandem without any difficulty. ASSESSMENT/PLAN:   Chronic migraine without aura without status migrainosus, not intractable  (primary encounter diagnosis)  Subacute maxillary sinusitis  Forgetfulness    Discussion/plan:  Migraines-   Trial stopping Depakote 250mg nightly  Continue follow up with ENT for chronic sinusitis/inflammation    Forgetfulness-   Functional due to adjustment disorder vs mild cognitive impairment. Improved, B12 normal. Goal 7-8 hours of sleep, do cognitive and physical activities    Requested Prescriptions      No prescriptions requested or ordered in this encounter         We discussed in depth regarding the diagnosis, prognosis, treatment. The patient was given ample opportunity to ask questions. All questions and concerns were addressed.      Crow Ochoa,   Neurology and Neuromuscular medicine  [facility]

## 2023-09-28 ENCOUNTER — TELEPHONE (OUTPATIENT)
Dept: FAMILY MEDICINE CLINIC | Facility: CLINIC | Age: 73
End: 2023-09-28

## 2023-09-28 NOTE — TELEPHONE ENCOUNTER
Yes-patient should complete the new COVID-19 booster that was just released. She should have another booster in 6 months of its recommended again. She needs to complete her flu shot and after completing COVID-19 and her influenza shot she should have her RSV either of the new injections at her local pharmacy but not together with COVID-19. This is an antigen based vaccine like influenza. Some patients have had body aches and chills for a day after having the vaccine so she should plan accordingly. The CDC is predicting a triple epidemic of RSV, flu and COVID so I am recommending all patients age 61 and older complete all of these vaccines. RSV and COVID hospitalized patients have a higher death rate as we saw last fall.     Thank you

## 2023-09-28 NOTE — TELEPHONE ENCOUNTER
Pt wants to know if she should get the RSV and COVID booster vaccines. States she got last COVID vaccine in August 2022. Informed pt that the CDC recommends every one 6 months and older to get the new COVID booster. Pt wants to know if PCP recommends it. Routed to provider for review and signature.

## 2023-11-02 DIAGNOSIS — I10 BENIGN ESSENTIAL HTN: ICD-10-CM

## 2023-11-02 RX ORDER — VALSARTAN 80 MG/1
80 TABLET ORAL DAILY
Qty: 90 TABLET | Refills: 0 | Status: SHIPPED | OUTPATIENT
Start: 2023-11-02

## 2023-11-02 NOTE — TELEPHONE ENCOUNTER
Pt called, states she is out of Valsartan. She looked for it in her house but could not find it. She has all her other medications from [address] [employer] [address] except the Valsartan.

## 2023-11-02 NOTE — TELEPHONE ENCOUNTER
Refill Passed Protocol:     Pt requesting refill of   Requested Prescriptions     Pending Prescriptions Disp Refills    valsartan 80 MG Oral Tab 90 tablet 0     Sig: Take 1 tablet (80 mg total) by mouth daily. Refill was approved and sent to pharmacy:     Last Time Medication was Filled:  7/7/2023    Last Office Visit with Provider: 7/19/2023    No future appointments.

## 2023-12-03 DIAGNOSIS — J31.0 NONALLERGIC RHINITIS: ICD-10-CM

## 2023-12-04 RX ORDER — FLUTICASONE PROPIONATE 50 MCG
SPRAY, SUSPENSION (ML) NASAL
Qty: 48 G | Refills: 3 | Status: SHIPPED | OUTPATIENT
Start: 2023-12-04

## 2023-12-04 NOTE — TELEPHONE ENCOUNTER
Refill Passed Protocol:     Pt requesting refill of   Requested Prescriptions     Pending Prescriptions Disp Refills    FLUTICASONE PROPIONATE 50 MCG/ACT Nasal Suspension [Pharmacy Med Name: FLUTICASONE PROPIONATE 50 MCG/ACT Suspension] 48 g 3     Sig: USE 2 SPRAYS IN EACH NOSTRIL EVERY DAY     Refill was approved and sent to pharmacy:     Last Time Medication was Filled:  7/10/2023    Last Office Visit with Provider: 7/19/2023    No future appointments.

## 2023-12-20 DIAGNOSIS — E11.40 TYPE 2 DIABETES MELLITUS WITH DIABETIC NEUROPATHY, WITHOUT LONG-TERM CURRENT USE OF INSULIN (HCC): ICD-10-CM

## 2023-12-21 RX ORDER — DULAGLUTIDE 3 MG/.5ML
INJECTION, SOLUTION SUBCUTANEOUS
Qty: 6 ML | Refills: 0 | OUTPATIENT
Start: 2023-12-21

## 2023-12-21 NOTE — TELEPHONE ENCOUNTER
Refill Failed Protocol:     Pt requesting refill of   Requested Prescriptions     Pending Prescriptions Disp Refills    TRULICITY 3 BI/6.0RO Subcutaneous Solution Pen-injector [Pharmacy Med Name: Trulicity Subcutaneous Solution Pen-injector 3 MG/0.5ML] 6 mL 0     Sig: INJECT 3 MG (0.5 ML) UNDER THE SKIN ONCE A WEEK      Last Time Medication was Filled:  4/13/2023    Last Office Visit with Provider: 7/19/2023    Unable to approve refill,     Diabetic Medication Protocol [id number]/20/2023 11:06 PM   Protocol Details HgBA1C procedure resulted in past 6 months    Last HgBA1C < 7.5    Microalbumin procedure in past 12 months or taking ACE/ARB    Appointment in past 6 or next 3 months             No future appointments. Type 2 diabetes mellitus with diabetic neuropathy, without long-term current use of insulin (HCC)  - OPHTHALMOLOGY - INTERNAL  Controlled  Continue current medications except increase Trulicity to 3.0 mg weekly-patient will contact [facility] in the interim continue Trulicity 1.5 mg  Diabetes is controlled but still needs to work on weight loss which is part of diabetic plan and medication helps with his goal  Start walking 30 minutes daily.   Keep scheduled eye exam 6/12/2023  Continue low-carb diet  Labs are due in 3 months August 2023 and follow-up in office

## 2023-12-26 ENCOUNTER — MED REC SCAN ONLY (OUTPATIENT)
Dept: FAMILY MEDICINE CLINIC | Facility: CLINIC | Age: 73
End: 2023-12-26

## 2023-12-28 DIAGNOSIS — E11.40 TYPE 2 DIABETES MELLITUS WITH DIABETIC NEUROPATHY, WITHOUT LONG-TERM CURRENT USE OF INSULIN (HCC): ICD-10-CM

## 2023-12-28 RX ORDER — CALCIUM CITRATE/VITAMIN D3 200MG-6.25
1 TABLET ORAL DAILY
Qty: 100 STRIP | Refills: 3 | Status: SHIPPED | OUTPATIENT
Start: 2023-12-28

## 2024-02-15 DIAGNOSIS — G43.709 CHRONIC MIGRAINE WITHOUT AURA WITHOUT STATUS MIGRAINOSUS, NOT INTRACTABLE: ICD-10-CM

## 2024-02-15 DIAGNOSIS — E78.2 MIXED HYPERLIPIDEMIA: ICD-10-CM

## 2024-02-15 RX ORDER — ATORVASTATIN CALCIUM 40 MG/1
TABLET, FILM COATED ORAL
Qty: 90 TABLET | Refills: 3 | Status: SHIPPED | OUTPATIENT
Start: 2024-02-15 | End: 2024-04-15

## 2024-02-15 NOTE — TELEPHONE ENCOUNTER
Refill Passed Protocol:     Pt requesting refill of   Requested Prescriptions     Pending Prescriptions Disp Refills    ATORVASTATIN 40 MG Oral Tab [Pharmacy Med Name: ATORVASTATIN CALCIUM 40 MG Tablet] 90 tablet 3     Sig: TAKE 1 TABLET EVERY NIGHT     Refill was approved and sent to pharmacy:     Last Time Medication was Filled:  7/10/2023    Last Office Visit with Provider: 7/19/2023    No future appointments.

## 2024-02-15 NOTE — TELEPHONE ENCOUNTER
Medication: Divalproex 250 mg     Date of last refill: 05/04/2023 with 2 addt refills  [place]      Last office visit: 08/22/2023  Due back to clinic per last office note:    Date next office visit scheduled:    Future Appointments   Date Time Provider Department Center   7/1/2024  8:30 AM Jose Bello MD [facility]   8/20/2024  9:15 AM Elvia Hernandez DO [facility]           Last OV note recommendation:    ASSESSMENT/PLAN:   Chronic migraine without aura without status migrainosus, not intractable  (primary encounter diagnosis)  Subacute maxillary sinusitis  Forgetfulness     Discussion/plan:  Migraines-   Trial stopping Depakote 250mg nightly  Continue follow up with ENT for chronic sinusitis/inflammation     Forgetfulness-   Functional due to adjustment disorder vs mild cognitive impairment. Improved, B12 normal. Goal 7-8 hours of sleep, do cognitive and physical activities     Requested Prescriptions        No prescriptions requested or ordered in this encounter            We discussed in depth regarding the diagnosis, prognosis, treatment. The patient was given ample opportunity to ask questions. All questions and concerns were addressed.      Patricia Hernandez DO  Neurology and Neuromuscular medicine  [facility]

## 2024-02-16 RX ORDER — DIVALPROEX SODIUM 250 MG/1
250 TABLET, EXTENDED RELEASE ORAL DAILY
Qty: 90 TABLET | Refills: 3 | Status: SHIPPED | OUTPATIENT
Start: 2024-02-16

## 2024-02-16 NOTE — TELEPHONE ENCOUNTER
Spoke to patient who states she did trial not taking the medication however she then ended up having a \"whopper\" of a headache. Patient has been on it every since. She currently is taking 1 tablet every night.

## 2024-03-05 ENCOUNTER — TELEPHONE (OUTPATIENT)
Dept: FAMILY MEDICINE CLINIC | Facility: CLINIC | Age: 74
End: 2024-03-05

## 2024-03-13 DIAGNOSIS — J31.0 NONALLERGIC RHINITIS: ICD-10-CM

## 2024-03-13 NOTE — TELEPHONE ENCOUNTER
Refill Failed Protocol:     Pt requesting refill of   Requested Prescriptions     Pending Prescriptions Disp Refills    METFORMIN 500 MG Oral Tab [Pharmacy Med Name: METFORMIN HYDROCHLORIDE 500 MG Tablet] 90 tablet 3     Sig: TAKE 1 TABLET EVERY DAY WITH BREAKFAST    MONTELUKAST 10 MG Oral Tab [Pharmacy Med Name: MONTELUKAST SODIUM 10 MG Tablet] 90 tablet 3     Sig: TAKE 1 TABLET EVERY DAY      Last Time Medication was Filled:    Metformin 7/10/2023  Montelukast 5/15/2023    Last Office Visit with Provider: 7/19/2023    Unable to approve refill,     Diabetes Medication Protocol [id number]/13/2024 02:39 AM   Protocol Details Last A1C < 7.5 and within past 6 months    In person appointment or virtual visit in the past 6 mos or appointment in next 3 mos    Microalbumin procedure in past 12 months or taking ACE/ARB    EGFRCR or GFRNAA > 50    GFR in the past 12 months          Asthma & COPD Medication Protocol [id number]/13/2024 02:39 AM   Protocol Details Asthma Action Score greater than or equal to 20    AAP/ACT given in last 12 months    Appointment in past 6 or next 3 months        Appt. scheduled on 4/15/2024

## 2024-03-13 NOTE — TELEPHONE ENCOUNTER
VERY overdue for f/u with Dr. Alvarado.  I can only give 30 day supplies, what local pharmacy does she want me to send this to?  Needs to make f/u appt with Dr. Jenny obregon.  Hasn't been seen by her in almost a year.

## 2024-03-14 RX ORDER — MONTELUKAST SODIUM 10 MG/1
10 TABLET ORAL DAILY
Qty: 30 TABLET | Refills: 0 | OUTPATIENT
Start: 2024-03-14

## 2024-04-12 RX ORDER — CLONAZEPAM 0.5 MG/1
TABLET ORAL
COMMUNITY
Start: 2024-02-16

## 2024-04-15 ENCOUNTER — OFFICE VISIT (OUTPATIENT)
Dept: FAMILY MEDICINE CLINIC | Facility: CLINIC | Age: 74
End: 2024-04-15
Payer: MEDICARE

## 2024-04-15 VITALS
BODY MASS INDEX: 43.14 KG/M2 | TEMPERATURE: 97 F | RESPIRATION RATE: 22 BRPM | SYSTOLIC BLOOD PRESSURE: 126 MMHG | HEART RATE: 97 BPM | DIASTOLIC BLOOD PRESSURE: 78 MMHG | OXYGEN SATURATION: 96 % | WEIGHT: 255.81 LBS | HEIGHT: 64.76 IN

## 2024-04-15 DIAGNOSIS — Z00.00 ENCOUNTER FOR ANNUAL HEALTH EXAMINATION: Primary | ICD-10-CM

## 2024-04-15 DIAGNOSIS — E07.89 THYROID FULLNESS: ICD-10-CM

## 2024-04-15 DIAGNOSIS — I10 BENIGN ESSENTIAL HTN: ICD-10-CM

## 2024-04-15 DIAGNOSIS — E11.40 TYPE 2 DIABETES MELLITUS WITH DIABETIC NEUROPATHY, WITHOUT LONG-TERM CURRENT USE OF INSULIN (HCC): ICD-10-CM

## 2024-04-15 DIAGNOSIS — Z86.718 HISTORY OF DVT OF LOWER EXTREMITY: ICD-10-CM

## 2024-04-15 DIAGNOSIS — J32.2 CHRONIC ETHMOIDAL SINUSITIS: ICD-10-CM

## 2024-04-15 DIAGNOSIS — G89.29 CHRONIC KNEE PAIN AFTER TOTAL REPLACEMENT OF RIGHT KNEE JOINT: ICD-10-CM

## 2024-04-15 DIAGNOSIS — Z12.31 ENCOUNTER FOR SCREENING MAMMOGRAM FOR MALIGNANT NEOPLASM OF BREAST: ICD-10-CM

## 2024-04-15 DIAGNOSIS — G25.81 RLS (RESTLESS LEGS SYNDROME): ICD-10-CM

## 2024-04-15 DIAGNOSIS — G43.709 CHRONIC MIGRAINE WITHOUT AURA WITHOUT STATUS MIGRAINOSUS, NOT INTRACTABLE: ICD-10-CM

## 2024-04-15 DIAGNOSIS — F41.1 GENERALIZED ANXIETY DISORDER: ICD-10-CM

## 2024-04-15 DIAGNOSIS — Z23 NEED FOR VACCINATION: ICD-10-CM

## 2024-04-15 DIAGNOSIS — K21.9 GASTRO-ESOPHAGEAL REFLUX DISEASE WITHOUT ESOPHAGITIS: ICD-10-CM

## 2024-04-15 DIAGNOSIS — G89.29 CHRONIC PAIN OF LEFT KNEE: ICD-10-CM

## 2024-04-15 DIAGNOSIS — M25.562 CHRONIC PAIN OF LEFT KNEE: ICD-10-CM

## 2024-04-15 DIAGNOSIS — J44.89 ASTHMA WITH COPD (CHRONIC OBSTRUCTIVE PULMONARY DISEASE) (HCC): Chronic | ICD-10-CM

## 2024-04-15 DIAGNOSIS — E78.2 MIXED HYPERLIPIDEMIA: ICD-10-CM

## 2024-04-15 DIAGNOSIS — Z96.651 CHRONIC KNEE PAIN AFTER TOTAL REPLACEMENT OF RIGHT KNEE JOINT: ICD-10-CM

## 2024-04-15 DIAGNOSIS — J31.0 NONALLERGIC RHINITIS: ICD-10-CM

## 2024-04-15 DIAGNOSIS — M25.561 CHRONIC KNEE PAIN AFTER TOTAL REPLACEMENT OF RIGHT KNEE JOINT: ICD-10-CM

## 2024-04-15 DIAGNOSIS — F33.42 RECURRENT MAJOR DEPRESSIVE DISORDER, IN FULL REMISSION (HCC): ICD-10-CM

## 2024-04-15 DIAGNOSIS — G47.33 OSA ON CPAP: ICD-10-CM

## 2024-04-15 DIAGNOSIS — E03.9 ACQUIRED HYPOTHYROIDISM: ICD-10-CM

## 2024-04-15 DIAGNOSIS — E66.01 SEVERE OBESITY (BMI >= 40) (HCC): ICD-10-CM

## 2024-04-15 DIAGNOSIS — Z78.0 ASYMPTOMATIC MENOPAUSE: ICD-10-CM

## 2024-04-15 RX ORDER — OMEPRAZOLE 20 MG/1
CAPSULE, DELAYED RELEASE ORAL
Qty: 90 CAPSULE | Refills: 3 | Status: SHIPPED | OUTPATIENT
Start: 2024-04-15

## 2024-04-15 RX ORDER — GABAPENTIN 300 MG/1
300 CAPSULE ORAL NIGHTLY
Qty: 90 CAPSULE | Refills: 1 | Status: SHIPPED | OUTPATIENT
Start: 2024-04-15

## 2024-04-15 RX ORDER — TIRZEPATIDE 5 MG/.5ML
5 INJECTION, SOLUTION SUBCUTANEOUS WEEKLY
Qty: 2 ML | Refills: 0 | Status: SHIPPED | OUTPATIENT
Start: 2024-05-14

## 2024-04-15 RX ORDER — TIRZEPATIDE 2.5 MG/.5ML
2.5 INJECTION, SOLUTION SUBCUTANEOUS WEEKLY
Qty: 2 ML | Refills: 0 | Status: SHIPPED | OUTPATIENT
Start: 2024-04-15 | End: 2024-05-07

## 2024-04-15 RX ORDER — ATORVASTATIN CALCIUM 40 MG/1
40 TABLET, FILM COATED ORAL NIGHTLY
Qty: 90 TABLET | Refills: 0 | Status: SHIPPED | OUTPATIENT
Start: 2024-04-15

## 2024-04-15 RX ORDER — VALSARTAN 80 MG/1
80 TABLET ORAL DAILY
Qty: 90 TABLET | Refills: 1 | Status: SHIPPED | OUTPATIENT
Start: 2024-04-15

## 2024-04-15 RX ORDER — MONTELUKAST SODIUM 10 MG/1
10 TABLET ORAL DAILY
Qty: 90 TABLET | Refills: 2 | Status: SHIPPED | OUTPATIENT
Start: 2024-04-15

## 2024-04-15 RX ORDER — FLUTICASONE PROPIONATE 50 MCG
2 SPRAY, SUSPENSION (ML) NASAL DAILY
Qty: 48 G | Refills: 3 | Status: SHIPPED | OUTPATIENT
Start: 2024-04-15

## 2024-04-15 NOTE — PATIENT INSTRUCTIONS
Leah Parsons's SCREENING SCHEDULE   Tests on this list are recommended by your physician but may not be covered, or covered at this frequency, by your insurer.   Please check with your insurance carrier before scheduling to verify coverage.   PREVENTATIVE SERVICES FREQUENCY &  COVERAGE DETAILS LAST COMPLETION DATE   Diabetes Screening    Fasting Blood Sugar /  Glucose    One screening every 12 months if never tested or if previously tested but not diagnosed with pre-diabetes   One screening every 6 months if diagnosed with pre-diabetes Lab Results   Component Value Date    GLUCOSE 93 12/12/2006    GLU 81 06/15/2023        Cardiovascular Disease Screening    Lipid Panel  Cholesterol  Lipoprotein (HDL)  Triglycerides Covered every 5 years for all Medicare beneficiaries without apparent signs or symptoms of cardiovascular disease Lab Results   Component Value Date    CHOLEST 170 06/15/2023    HDL 69 06/15/2023    LDL 81 06/15/2023    TRIG 103 06/15/2023         Electrocardiogram (EKG)   Covered if needed at Welcome to Medicare, and non-screening if indicated for medical reasons 11/18/2021      Ultrasound Screening for Abdominal Aortic Aneurysm (AAA) Covered once in a lifetime for one of the following risk factors   • Men who are 65-75 years old and have ever smoked   • Anyone with a family history -     Colorectal Cancer Screening  Covered for ages 50-85; only need ONE of the following:    Colonoscopy   Covered every 10 years    Covered every 2 years if patient is at high risk or previous colonoscopy was abnormal 05/23/2023    Health Maintenance   Topic Date Due   • Colorectal Cancer Screening  05/23/2028       Flexible Sigmoidoscopy   Covered every 4 years -    Fecal Occult Blood Test Covered annually -   Bone Density Screening    Bone density screening    Covered every 2 years after age 65 if diagnosed with risk of osteoporosis or estrogen deficiency.    Covered yearly for long-term glucocorticoid medication  use (Steroids) Last Dexa Scan:    DEXA BONE DENSITY AXIAL (CPT=77080) 08/24/2021      No recommendations at this time   Pap and Pelvic    Pap   Covered every 2 years for women at normal risk; Annually if at high risk -  No recommendations at this time    Chlamydia Annually if high risk -  No recommendations at this time   Screening Mammogram    Mammogram     Recommend annually for all female patients aged 40 and older    One baseline mammogram covered for patients aged 35-39 08/08/2023    Health Maintenance   Topic Date Due   • Mammogram  08/08/2024       Immunizations    Influenza Covered once per flu season  Please get every year 11/17/2023  No recommendations at this time    Pneumococcal Each vaccine ([id number] & [id number]) covered once after 65 Prevnar 13: 05/20/2016    [id number]: 12/20/2016     No recommendations at this time    Hepatitis B One screening covered for patients with certain risk factors   05/19/2022  No recommendations at this time    Tetanus Toxoid Not covered by Medicare Part B unless medically necessary (cut with metal); may be covered with your pharmacy prescription benefits -    Tetanus, Diptheria and Pertusis TD and TDaP Not covered by Medicare Part B -  No recommendations at this time    Zoster Not covered by Medicare Part B; may be covered with your pharmacy  prescription benefits 11/08/2016  No recommendations at this time     Diabetes      Hemoglobin A1C Annually; if last result is elevated, may be asked to retest more frequently.    Medicare covers every 3 months Lab Results   Component Value Date     (H) 05/19/2022    A1C 5.5 06/15/2023       Hemoglobin A1C Test due on 12/15/2023    Creat/alb ratio Annually Lab Results   Component Value Date    MICROALBCREA 8.2 05/19/2022       LDL Annually Lab Results   Component Value Date    LDL 81 06/15/2023       Dilated Eye Exam Annually [unfilled]     Annual Monitoring of Persistent Medications (ACE/ARB, digoxin diuretics,  anticonvulsants)    Potassium Annually Lab Results   Component Value Date    K 4.6 06/15/2023         Creatinine   Annually Lab Results   Component Value Date    CREATSERUM 0.81 06/15/2023         BUN Annually Lab Results   Component Value Date    BUN 21 06/15/2023       Drug Serum Conc Annually No results found for: \"DIGOXIN\", \"DIG\", \"VALP\"

## 2024-04-15 NOTE — PROGRESS NOTES
Subjective:   Leah Parsons is a 73 year old female who presents for a MA (Medicare Advantage) Supervisit (Once per calendar year) and  address chronic medical conditions.    LOV 4/16/2023- pt moved into own apartment and states no one to monitoring her eating and less stairs. Gained weight      Type 2 diabetes- FBS= 104-110. 2hr pp-  not checking  only on metformin ER 500mg. off trulicity- it was expensive and not helping with weight loss. Hungry all the time. Never had nausea or side effects with trulicity.  Denies thyroid cancer history or MEN type II. Gained all 40# back lost on Trulicity.  Complaining of throat, neck pressures intermittently x 1 month. Denies dyspagia, abdominal pain, melena voice changes, hoarseness. Taking omeprazole.  Last A1c  HEMOGLOBIN A1C (%)   Date Value   11/18/2021 5.9 (A)   12/12/2006 6.4 (H)     A1C (%)   Date Value   05/24/2006 6.0 (H)     HEMOGLOBIN A1c (% of total Hgb)   Date Value   06/15/2023 5.5   04/18/2023 5.7 (H)     HgbA1C (%)   Date Value   05/19/2022 6.3 (H)   07/07/2021 6.6 (H)   03/18/2021 7.0 (H)    overdue for urine microalbumin, 4/18/2023  Last eye exam performed on 6/23/2023-Dr. Oppenheim-no DVR  Denies feeling depressed or sad.  History of depression managed by psychiatrist.  Denies feeling depressed, sad, hopeless, anxious, irritable, SI, HI, insomnia, crying spells.  Denies burning in her feet.  Checks her feet daily.  Denies any sores or lesions   having difficulty cutting toenails-requesting podiatry referral.  Denies any sores or lesions     Hypertension-taking medications prescribed.  Denies any headache or dizziness, chest pain or shortness of breath or dyspnea on exertion.  Able to walk from her car to the office without stopping.  Has difficulty with stairs due to chronic right knee pain with loss of range of motion following total knee replacement.  No home blood pressure monitoring. Denies chest pain, sob, MENDOZA, lower extremity edema, dizziness.  Occasional migraine with allergies.   BP Readings from Last 5 Encounters:   04/15/24 126/78   08/22/23 98/54   07/19/23 118/70   05/26/23 130/70   04/13/23 130/76     Pt presents for recheck of hyperlipidemia. Patient reports taking medications as instructed, no medication side effects noted. Denies any generalized muscle aches.     Patient denies pulmonary history such as asthma or lung disease.  Never smoked.  No vaping.  Denies any cough or wheezing or shortness of breath.  Vaccinated for COVID-19-needs booster.     Sleep apnea- using cpap nightly >4 hours nightly- compliant.  Has no a.m. headaches and feels well rested in the morning.  Has annual visit with pulmonologist- needs referral.  Denies chronic fatigue.    GERD-improved with weight loss.  Occasionally some burping with Trulicity.  Denies dysphagia, epigastric pain, melena.  Off PPI or Pepcid.  Diet controlling.  Symptoms less than 1 time a week.     Last dexa normal on 8/24/2021 at [facility]. Denies falls or fractures.taking calcium- citral slow release 600mg 2 tabs at night with vit D3 2000units.     Health Maintenance   Topic Date Due    Diabetes Care A1C  12/15/2023    Diabetes Care Dilated Eye Exam  06/23/2024    Diabetes Care: Microalb/Creat Ratio  04/18/2024    Diabetes Care Foot Exam  05/26/2024    Diabetes Care: GFR  06/15/2024    Mammogram  08/08/2024    Colorectal Cancer Screening  05/23/2028    Influenza Vaccine  Completed    DEXA Scan  Completed    MA Annual Health Assessment  Completed    Annual Depression Screening  Completed    Fall Risk Screening (Annual)  Completed    Pneumococcal Vaccine: 65+ Years  Completed    Zoster Vaccines  Completed    COVID-19 Vaccine  Completed       History/Other:   Fall Risk Assessment:   She has been screened for Falls and is High Risk. Fall Prevention information provided to patient in After Visit Summary.    Do you feel unsteady when standing or walking?: No  Do you worry about falling?: Yes  Have you fallen in  the past year?: No     Cognitive Assessment:   She had a completely normal cognitive assessment - see flowsheet entries     Functional Ability/Status:   Leah Parsons has some abnormal functions as listed below:  She has Driving difficulties based on screening of functional status. She has Hearing problems based on screening of functional status.She has Vision problems based on screening of functional status.       Depression Screening (PHQ-2/PHQ-9): PHQ-2 SCORE: 0  , done 4/11/2024   If you checked off any problems, how difficult have these problems made it for you to do your work, take care of things at home, or get along with other people?: Not difficult at all    Last [place] Suicide Screening on 4/15/2024 was No Risk.     Advanced Directives:   She has a Living Will on file in [employer]; reviewed and discussed documents with patient (and family/surrogate if present).  She has a Power of  for Health Care on file in [employer].  Patient has Advance Care Planning documents present in EMR. Reviewed documents with patient (and family/surrogate if present).      Patient Active Problem List   Diagnosis    History of gastritis    Left carpal tunnel syndrome    YVONNE on CPAP    Irritable colon    Diverticulosis of large intestine without hemorrhage    Acquired hypothyroidism    Benign essential HTN    Major depression in full remission (HCC)    Chronic migraine without aura without status migrainosus, not intractable    History of colonoscopy with polypectomy    DNR (do not resuscitate)    Nonallergic rhinitis    BMI 35.0-35.9,adult    History of DVT of lower extremity    Osteoarthritis of left knee    Chondromalacia of right hip    Generalized anxiety disorder    Spinal stenosis of lumbar region with neurogenic claudication    Chronic 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    Current Medications:  Outpatient Medications Marked as Taking for the 4/15/24 encounter (Office Visit) with Marie Alvarado DO   Medication Sig    clonazePAM 0.5 MG Oral Tab     divalproex  MG Oral Tablet 24 Hr Take 1 tablet (250 mg total) by mouth daily.    ATORVASTATIN 40 MG Oral Tab TAKE 1 TABLET EVERY NIGHT    Glucose Blood (TRUE METRIX BLOOD GLUCOSE TEST) In Vitro Strip 1 strip by In Vitro route daily.    FLUTICASONE PROPIONATE 50 MCG/ACT Nasal Suspension USE 2 SPRAYS IN EACH NOSTRIL EVERY DAY    valsartan 80 MG Oral Tab Take 1 tablet (80 mg total) by mouth daily.    TRUEPLUS LANCETS 33G Does not apply Misc TEST BLOOD SUGAR ONE TIME DAILY AS DIRECTED    omeprazole 20 MG Oral Capsule Delayed Release TAKE 1 CAPSULE EVERY MORNING ON AN EMPTY STOMACH    METFORMIN 500 MG Oral Tab TAKE 1 TABLET EVERY DAY  WITH  BREAKFAST    LEVOTHYROXINE 88 MCG Oral Tab TAKE 1 TABLET BEFORE BREAKFAST    gabapentin 300 MG Oral Cap Take 1 capsule (300 mg total) by mouth nightly.    Azelastine HCl 137 MCG/SPRAY Nasal Solution     MONTELUKAST 10 MG Oral Tab TAKE 1 TABLET EVERY DAY    sertraline 100 MG Oral Tab Take 1 tablet (100 mg total) by mouth nightly.    buPROPion  MG Oral Tablet 24 Hr Take 1 tablet (300 mg total) by mouth every morning.    CRANBERRY OR Take by mouth daily.    CALCIUM CITRATE-VITAMIN D3 OR Take by mouth As Directed.    Aspirin-Acetaminophen-Caffeine (EXCEDRIN EXTRA STRENGTH OR) Take 2 tablets by mouth every 6 (six) hours as needed.    Spacer/Aero-Holding Chambers ([facility]) Does not apply Misc Use as directed     Probiotic Product  (PROBIOTIC OR) Take 1 capsule by mouth every morning. Indications: supplement    Cholecalciferol (VITAMIN D3) 1000 UNITS Oral Cap Take 2 capsules (2,000 Units total) by mouth daily.       Medical History:  She  has a past medical history of Acute deep vein thrombosis (DVT) of tibial vein of left lower extremity ([facility]) (10/10/2018), Acute deep vein thrombosis (DVT) of tibial vein of left lower extremity ([facility]) (10/10/2018), Acute recurrent ethmoidal sinusitis (2019), Allergic rhinitis (Tested but none specific), Anxiety (), Arthritis, Aspiration pneumonia of both lungs ([facility]) (2021), Bursitis, shoulder, left (3/24/2017), Depression (), Diabetes ([facility]) (), DVT (deep venous thrombosis) ([facility]) (2018), Epigastric pain (2016), Esophageal reflux, Essential hypertension (3/1999), Extrinsic asthma, unspecified, HEADACHES, History of colonoscopy with polypectomy (), Hypothyroidism (), Impingement syndrome, shoulder, left (3/24/2017), Leg length discrepancy (2019), Migraines, Normal vaginal delivery ([facility]), Obesity (), Osteoarthritis (), Other and unspecified hyperlipidemia, Parainfluenza virus laryngotracheobronchitis (2021), Prediabetes, Right hip impingement syndrome (2019), Right sided sciatica (2019), Shoulder pain, left, Sleep apnea, Thyroid condition, and Unspecified sleep apnea ().  Surgical History:  She  has a past surgical history that includes appendectomy (); cholecystectomy (); other surgical history (2012); knee surgery (3/8/2015 ); knee surgery (2015 ); colonoscopy (); colonoscopy (N/A, 2017); knee replacement surgery;  (1977); and back surgery (2021).   Family History:  Her family history includes Cancer in her paternal grandmother; Dementia in her father; Diabetes in her maternal grandmother and mother; Heart Disorder in her maternal uncle; Hypertension in her father, mother, and paternal grandmother; Lipids  in her mother; Obesity in her paternal grandmother.  Social History:  She  reports that she has never smoked. She has never used smokeless tobacco. She reports that she does not drink alcohol and does not use drugs.    Tobacco:  She has never smoked tobacco.    CAGE Alcohol Screen:   CAGE screening score of 0 on 4/11/2024, showing low risk of alcohol abuse.      Patient Care Team:  Marie Alvarado DO as PCP - General (Family Practice)  Deep Diane MD (GASTROENTEROLOGY)  Moshe Price MD  (PSYCHIATRIST)  Elvia Hernandez DO (NEUROLOGY)  Leti Villasenor MD (NEPHROLOGY)  Clifton Griffin MD (Psychiatry)  Kennedy Minaya MD (Internal Medicine)  Phong Rosenbaum MD (HEMATOLOGY)  Yfn Atwood MD (PULMONARY DISEASES)  Roxanne Celeste PT as Physical Therapist (Physical Therapy)  Micheline Peterson PT as Physical Therapist  London Will (Pediatric Ophthalmology)    Review of Systems  GENERAL: feels well otherwise  SKIN: denies any unusual skin lesions  EYES: denies blurred vision or double vision  HEENT: denies nasal congestion, sinus pain or ST  LUNGS: denies shortness of breath with exertion  CARDIOVASCULAR: denies chest pain on exertion  GI: denies abdominal pain, denies heartburn  : denies dysuria, vaginal discharge or itching, no complaint of urinary incontinence   MUSCULOSKELETAL: denies back pain  NEURO: denies headaches  PSYCHE: denies depression or anxiety  HEMATOLOGIC: denies hx of anemia  ENDOCRINE: denies thyroid history  ALL/ASTHMA: denies hx of allergy or asthma    Objective:   Physical Exam  General Appearance:  Alert, cooperative, no distress, appears stated age   Head:  Normocephalic, without obvious abnormality, atraumatic   Eyes:  PERRL, conjunctiva/corneas clear, EOM's intact both eyes   Ears:  Normal TM's and external ear canals, both ears   Nose: Nares normal, septum midline,mucosa normal, no drainage or sinus tenderness   Throat: Lips, mucosa, and tongue normal; teeth and gums  normal   Neck: Supple, symmetrical, trachea midline, no adenopathy;  thyroid: not enlarged, symmetric, no tenderness/mass/nodules; no carotid bruit or JVD   Back:   Symmetric, no curvature, ROM normal, no CVA tenderness   Lungs:   Clear to auscultation bilaterally, respirations unlabored   Heart:  Regular rate and rhythm, S1 and S2 normal, no murmur, rub, or gallop   Abdomen:   Soft, non-tender, bowel sounds active all four quadrants,  no masses, no organomegaly   Pelvic: Deferred   Extremities: Extremities normal, atraumatic, no cyanosis or edema   Pulses: 2+ and symmetric   Skin: Skin color, texture, turgor normal, no rashes or lesions   Lymph nodes: Cervical, supraclavicular, and axillary nodes normal   Neurologic: Normal   Refused breast and pelvic exam.  Denies any vaginal bleeding.    /78 (BP Location: Left arm, Patient Position: Sitting, Cuff Size: large)   Pulse 97   Temp 97.4 °F (36.3 °C) (Temporal)   Resp 22   Ht 5' 4.76\" (1.645 m)   Wt 255 lb 12.8 oz (116 kg)   SpO2 96%   BMI 42.88 kg/m²  Estimated body mass index is 42.88 kg/m² as calculated from the following:    Height as of this encounter: 5' 4.76\" (1.645 m).    Weight as of this encounter: 255 lb 12.8 oz (116 kg).    Medicare Hearing Assessment:   Hearing Screening    Screening Method: Finger Rub  Finger Rub Result: Pass (Comment: Wearing hearing aids bilateral)         Visual Acuity:   Right Eye Visual Acuity: Uncorrected Right Eye Chart Acuity: 20/40   Left Eye Visual Acuity: Uncorrected Left Eye Chart Acuity: 20/50   Both Eyes Visual Acuity: Uncorrected Both Eyes Chart Acuity: 20/40   Able To Tolerate Visual Acuity: Yes        Assessment & Plan:   Leah Parsons is a 73 year old female who presents for a Medicare Assessment.     1. Encounter for annual health examination (Primary)  2. Type 2 diabetes mellitus with diabetic neuropathy, without long-term current use of insulin ([facility])  -     Ophthalmology Referral - In Network  -      Lipid Panel  -     Comp Metabolic Panel (14)  -     Microalb/Creat Ratio, Random Urine  -     Hemoglobin A1C  -     Lipid Panel; Future; Expected date: 10/01/2024  -     Comp Metabolic Panel (14); Future; Expected date: 10/01/2024  -     Hemoglobin A1C; Future; Expected date: 10/01/2024  -     Detailed, Mod Complex (37256)  -     Podiatry Referral - In Network  -     Lipid Panel  -     Comp Metabolic Panel (14)  -     Hemoglobin A1C  -Increase    metFORMIN HCl; Take 4 tablets (2,000 mg total) by mouth daily with breakfast.  Dispense: 360 tablet; Refill: 1  -    Start Mounjaro; Inject 2.5 mg into the skin once a week for 4 doses.  Dispense: 2 mL; Refill: 0  -After 4 weeks increase Rx start   Mounjaro; Inject 5 mg into the skin once a week.  Dispense: 2 mL; Refill: 0  Controlled by history  Start Mounjaro  Discontinue Trulicity since not effective with weight loss was feeling hungry gained all her weight back  Risk and benefits of  Moujaro/tirzepatide discussed-, nausea, vomiting, epigastric pain.  Risk of pancreatitis, medullary thyroid carcinoma/thyroid tumor, discussed if neck mass, dysphasia, dyspnea, persistent hoarseness, stop medication and call.  Any severe symptoms with side effects such as epigastric pain, vomiting, angioedema, increased heart rate, call office ASAP  Increase metformin  mg - 2 tablets p.o. twice daily-May need to titrate with 2 tablets in the morning for 1 week if no abdominal cramping or side effects or diarrhea then increase to 2 tablets the morning and 1 at night and after 1 week if no symptoms may increase to 2 tablets in the morning and 2 tablets at night.  Patient may also take all 4 tablets in the morning if tolerates.  Metformin will help with blood sugars also may help with weight loss  Will help patient apply for [facility] for Mounjaro-on fixed budget  Encouraged low-carb diet  Encouraged walking 30 minutes daily  Continue daily foot exams  contintue regular exercise 3x  weekly or more for 30 mins  Last ophthalmologist DM exam 6/23/2023-Dr. Oppenheim   , required annual  Immunizations hep B #3  Urine microalbumin annual-due today 4/15/2024  Repeat labs HgA1c, lipids, CMP in 3 months    3. Benign essential HTN  -     Comp Metabolic Panel (14)  -     CBC With Differential With Platelet  -     Comp Metabolic Panel (14); Future; Expected date: 10/01/2024  -     Detailed, Mod Complex (54594)  -     Comp Metabolic Panel (14)  -     Valsartan; Take 1 tablet (80 mg total) by mouth daily.  Dispense: 90 tablet; Refill: 1  Controlled  Encouraged 150-130mins aerobic exercise weekly  <2g Na diet daily and follow Mediterranean diet  Weight loss if overweight  Home b/p monitoring  goal <130/80- 85% of time, optimal 110-120/70-80 bring readings and cuff to each visit.  Continue current medication  Labs and OV q 3 months    4. Mixed hyperlipidemia  -     Lipid Panel  -     Comp Metabolic Panel (14)  -     Lipid Panel; Future; Expected date: 10/01/2024  -     Comp Metabolic Panel (14); Future; Expected date: 10/01/2024  -     Detailed, Mod Complex (27620)  -     Lipid Panel  -     Comp Metabolic Panel (14)  -     Atorvastatin Calcium; Take 1 tablet (40 mg total) by mouth nightly.  Dispense: 90 tablet; Refill: 0  Continue atorvastatin  Previously controlled on current dose  encourage mediterranean diet  Exercise brisk walk 30-60 mins at least 5 days weekly  Lose weight if overweight  Discussed labs need to be completed every 3 months and to be monitored-since diabetes history    6. YVONNE on CPAP  Overview:  On Cpap since 2008  Orders:  -     Detailed, Mod Complex ([id number])  -     Pulmonary Referral - In Network  Compliant  Continue CPAP  Follow-up with pulmonologist  7. Acquired hypothyroidism  Overview:  2011- no cancer or known nodules.  Orders:  -     TSH W Reflex To Free T4  -     Detailed, Mod Complex (00585)  Continue levothyroxine  Appears euthymic  Due for annual labs    8. Thyroid fullness  -      US THYROID (CPT=76536); Future; Expected date: 04/15/2024  Thyroid is nontender on exam but some mild enlargement  Check ultrasound  Denying dysphagia or to any choking sensation  Check TSH  9. Gastro-esophageal reflux disease without esophagitis  -     Detailed, Mod Complex (01772)  -     Omeprazole; TAKE 1 CAPSULE EVERY MORNING ON AN EMPTY STOMACH  Dispense: 90 capsule; Refill: 3  Controlled  Avoid triggers  Work on weight loss  Encouraged heartburn diet  Denies abdominal pain, melena, dysphagia or unexplained weight loss  10. Recurrent major depressive disorder, in full remission (HCC)  Overview:  Out pt Vel singh 2016. Psychiatrist in [facility]. Multiple medication  Orders:  -     Detailed, Mod Complex (15669)  Controlled  Continue sertraline, clonazepam, bupropion and divalproex  Continue follow-up with psychiatry  -contracts for safety- if suicidal or homicidal, call 911 or go to nearest ER and call office    11. Generalized anxiety disorder  -     Detailed, Mod Complex (80690)  Controlled  Continue current medication sertraline, clonazepam and follow-up with psychiatry  12. Chronic migraine without aura without status migrainosus, not intractable  -     Detailed, Mod Complex (85389)  Rarely has migraines will use   Extra strength Excedrin as needed once a month or less  13. RLS (restless legs syndrome)  -     Detailed, Mod Complex (54725)  -     Gabapentin; Take 1 capsule (300 mg total) by mouth nightly.  Dispense: 90 capsule; Refill: 1  Controlled on gabapentin  Patient wears CPAP greater than 4 hours and feels sleeping well denying fatigue  14. 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  Dispense: 90 capsule; Refill: 1  Work on weight loss  Pain controlled  15. History of DVT of lower extremity  -     Detailed, Mod Complex (35429)  Asymptomatic  Off Xarelto  16. Chronic  pain of left knee  -     Gabapentin; Take 1 capsule (300 mg total) by mouth nightly.  Dispense: 90 capsule; Refill: 1  Encouraged weight loss  May need to return to physical therapy if pain returns or out-of-control but patient feels managing  17. Nonallergic rhinitis  18. Chronic ethmoidal sinusitis  -     Detailed, Mod Complex (40781)  -     ENT Referral - In Network  -     Fluticasone Propionate; 2 sprays by Nasal route daily.  Dispense: 48 g; Refill: 3  -     Montelukast Sodium; Take 1 tablet (10 mg total) by mouth daily.  Dispense: 90 tablet; Refill: 2  Has chronic sinus and nonallergic rhinitis  Requesting consult with ENT contemplating surgery  Singulair helps with congestion wants to continue  Also using fluticasone  19. Severe obesity (BMI >= 40) ([facility])  -     Detailed, Mod Complex (19216)  Starting Mounjaro for diabetes and weight loss  Discussed importance of Mediterranean diet  Watching carbohydrates and sugar  Start walking 30 to 60 minutes daily  20. Encounter for screening mammogram for malignant neoplasm of breast  -     [facility] PRETTY 2D+3D SCREENING BILAT (CPT=77067/03085); Future; Expected date: 08/08/2024  21. Asymptomatic menopause  -     XR DEXA BONE DENSITOMETRY (CPT=77080); Future; Expected date: 04/15/2024  22. Need for vaccination  -     Hep B, Adult [[id number]]  -     COVID-19 mRNA Vac-Jeremie(Pfizer); Inject 0.3 mL (30 mcg total) into the muscle one time for 1 dose.  Dispense: 0.3 mL; Refill: 0    The patient indicates understanding of these issues and agrees to the plan.  Consult ordered.  Imaging studies ordered.  Lab work ordered.  Prescription medication ordered.  Reinforced healthy diet, lifestyle, and exercise.      Return in about 3 months (around 7/15/2024) for HTN,HL,DM, discuss labs, sooner if needed..     Marie Alvarado DO, 4/15/2024     Supplementary Documentation:   General Health:  In the past six months, have you lost more than 10 pounds without trying?: 2 - No  Has your appetite been  poor?: No  Type of Diet: Low Salt;Low Carb  How does the patient maintain a good energy level?: Stretching  How would you describe your daily physical activity?: Light  How would you describe your current health state?: Good  How do you maintain positive mental well-being?: Puzzles;Games;Visiting Family  On a scale of 0 to 10, with 0 being no pain and 10 being severe pain, what is your pain level?: 3 - (Mild)  In the past six months, have you experienced urine leakage?: 1-Yes  At any time do you feel concerned for the safety/well-being of yourself and/or your children, in your home or elsewhere?: No  Have you had any immunizations at another office such as Influenza, Hepatitis B, Tetanus, or Pneumococcal?: Yes       Leah Parsons's SCREENING SCHEDULE   Tests on this list are recommended by your physician but may not be covered, or covered at this frequency, by your insurer.   Please check with your insurance carrier before scheduling to verify coverage.   PREVENTATIVE SERVICES FREQUENCY &  COVERAGE DETAILS LAST COMPLETION DATE   Diabetes Screening    Fasting Blood Sugar /  Glucose    One screening every 12 months if never tested or if previously tested but not diagnosed with pre-diabetes   One screening every 6 months if diagnosed with pre-diabetes Lab Results   Component Value Date    GLUCOSE 93 12/12/2006    GLU 81 06/15/2023        Cardiovascular Disease Screening    Lipid Panel  Cholesterol  Lipoprotein (HDL)  Triglycerides Covered every 5 years for all Medicare beneficiaries without apparent signs or symptoms of cardiovascular disease Lab Results   Component Value Date    CHOLEST 170 06/15/2023    HDL 69 06/15/2023    LDL 81 06/15/2023    TRIG 103 06/15/2023         Electrocardiogram (EKG)   Covered if needed at Welcome to Medicare, and non-screening if indicated for medical reasons 11/18/2021      Ultrasound Screening for Abdominal Aortic Aneurysm (AAA) Covered once in a lifetime for one of the following  risk factors    Men who are 65-75 years old and have ever smoked    Anyone with a family history -     Colorectal Cancer Screening  Covered for ages 50-85; only need ONE of the following:    Colonoscopy   Covered every 10 years    Covered every 2 years if patient is at high risk or previous colonoscopy was abnormal 05/23/2023    Health Maintenance   Topic Date Due    Colorectal Cancer Screening  05/23/2028       Flexible Sigmoidoscopy   Covered every 4 years -    Fecal Occult Blood Test Covered annually -   Bone Density Screening    Bone density screening    Covered every 2 years after age 65 if diagnosed with risk of osteoporosis or estrogen deficiency.    Covered yearly for long-term glucocorticoid medication use (Steroids) Last Dexa Scan:    DEXA BONE DENSITY AXIAL (CPT=77080) 08/24/2021      No recommendations at this time   Pap and Pelvic    Pap   Covered every 2 years for women at normal risk; Annually if at high risk -  No recommendations at this time    Chlamydia Annually if high risk -  No recommendations at this time   Screening Mammogram    Mammogram     Recommend annually for all female patients aged 40 and older    One baseline mammogram covered for patients aged 35-39 08/08/2023    Health Maintenance   Topic Date Due    Mammogram  08/08/2024       Immunizations    Influenza Covered once per flu season  Please get every year 11/17/2023  No recommendations at this time    Pneumococcal Each vaccine ([id number] & [id number]) covered once after 65 Prevnar 13: 05/20/2016    [id number]: 12/20/2016     No recommendations at this time    Hepatitis B One screening covered for patients with certain risk factors   05/19/2022  No recommendations at this time    Tetanus Toxoid Not covered by Medicare Part B unless medically necessary (cut with metal); may be covered with your pharmacy prescription benefits -    Tetanus, Diptheria and Pertusis TD and TDaP Not covered by Medicare Part B -  No recommendations at this  time    Zoster Not covered by Medicare Part B; may be covered with your pharmacy  prescription benefits 11/08/2016  No recommendations at this time     Diabetes      Hemoglobin A1C Annually; if last result is elevated, may be asked to retest more frequently.    Medicare covers every 3 months Lab Results   Component Value Date     (H) 05/19/2022    A1C 5.5 06/15/2023       Hemoglobin A1C Test due on 12/15/2023    Creat/alb ratio Annually Lab Results   Component Value Date    MICROALBCREA 8.2 05/19/2022       LDL Annually Lab Results   Component Value Date    LDL 81 06/15/2023       Dilated Eye Exam Annually Last Diabetic Eye Exam:  Last Dilated Eye Exam: 06/23/23  Eye Exam shows Diabetic Retinopathy?: No       Annual Monitoring of Persistent Medications (ACE/ARB, digoxin diuretics, anticonvulsants)    Potassium Annually Lab Results   Component Value Date    K 4.6 06/15/2023         Creatinine   Annually Lab Results   Component Value Date    CREATSERUM 0.81 06/15/2023         BUN Annually Lab Results   Component Value Date    BUN 21 06/15/2023       Drug Serum Conc Annually No results found for: \"DIGOXIN\", \"DIG\", \"VALP\"

## 2024-04-26 ENCOUNTER — HOSPITAL ENCOUNTER (OUTPATIENT)
Dept: ULTRASOUND IMAGING | Age: 74
Discharge: HOME | End: 2024-04-26
Attending: FAMILY MEDICINE
Payer: MEDICARE

## 2024-04-26 DIAGNOSIS — E07.89 THYROID FULLNESS: ICD-10-CM

## 2024-04-26 PROCEDURE — 76536 US EXAM OF HEAD AND NECK: CPT | Performed by: FAMILY MEDICINE

## 2024-04-28 LAB
ABSOLUTE BASOPHILS: 59 CELLS/UL (ref 0–200)
ABSOLUTE EOSINOPHILS: 271 CELLS/UL (ref 15–500)
ABSOLUTE LYMPHOCYTES: 2633 CELLS/UL (ref 850–3900)
ABSOLUTE MONOCYTES: 449 CELLS/UL (ref 200–950)
ABSOLUTE NEUTROPHILS: 3188 CELLS/UL (ref 1500–7800)
ALBUMIN/GLOBULIN RATIO: 1.8 (CALC) (ref 1–2.5)
ALBUMIN: 4.6 G/DL (ref 3.6–5.1)
ALKALINE PHOSPHATASE: 96 U/L (ref 37–153)
ALT: 24 U/L (ref 6–29)
AST: 21 U/L (ref 10–35)
BASOPHILS: 0.9 %
BILIRUBIN, TOTAL: 0.5 MG/DL (ref 0.2–1.2)
BUN: 18 MG/DL (ref 7–25)
CALCIUM: 9.9 MG/DL (ref 8.6–10.4)
CARBON DIOXIDE: 26 MMOL/L (ref 20–32)
CHLORIDE: 102 MMOL/L (ref 98–110)
CHOL/HDLC RATIO: 2.7 (CALC)
CHOLESTEROL, TOTAL: 169 MG/DL
CREATININE, RANDOM URINE: 26 MG/DL (ref 20–275)
CREATININE: 0.88 MG/DL (ref 0.6–1)
EGFR: 69 ML/MIN/1.73M2
EOSINOPHILS: 4.1 %
GLOBULIN: 2.6 G/DL (CALC) (ref 1.9–3.7)
GLUCOSE: 115 MG/DL (ref 65–99)
HDL CHOLESTEROL: 62 MG/DL
HEMATOCRIT: 39.2 % (ref 35–45)
HEMOGLOBIN A1C: 6.8 % OF TOTAL HGB
HEMOGLOBIN: 11.6 G/DL (ref 11.7–15.5)
LDL-CHOLESTEROL: 82 MG/DL (CALC)
LYMPHOCYTES: 39.9 %
MCH: 23.2 PG (ref 27–33)
MCHC: 29.6 G/DL (ref 32–36)
MCV: 78.6 FL (ref 80–100)
MICROALBUMIN/CREATININE RATIO, RANDOM URINE: 19 MG/G CREAT
MICROALBUMIN: 0.5 MG/DL
MONOCYTES: 6.8 %
MPV: 9.7 FL (ref 7.5–12.5)
NEUTROPHILS: 48.3 %
NON-HDL CHOLESTEROL: 107 MG/DL (CALC)
PLATELET COUNT: 392 THOUSAND/UL (ref 140–400)
POTASSIUM: 5.1 MMOL/L (ref 3.5–5.3)
PROTEIN, TOTAL: 7.2 G/DL (ref 6.1–8.1)
RDW: 15.2 % (ref 11–15)
RED BLOOD CELL COUNT: 4.99 MILLION/UL (ref 3.8–5.1)
SODIUM: 140 MMOL/L (ref 135–146)
TRIGLYCERIDES: 146 MG/DL
TSH W/REFLEX TO FT4: 1.92 MIU/L (ref 0.4–4.5)
WHITE BLOOD CELL COUNT: 6.6 THOUSAND/UL (ref 3.8–10.8)

## 2024-05-01 DIAGNOSIS — D50.9 MICROCYTIC ANEMIA: Primary | ICD-10-CM

## 2024-05-01 PROBLEM — E07.89 THYROID FULLNESS: Status: RESOLVED | Noted: 2024-04-15 | Resolved: 2024-05-01

## 2024-05-02 ENCOUNTER — TELEPHONE (OUTPATIENT)
Dept: FAMILY MEDICINE CLINIC | Facility: CLINIC | Age: 74
End: 2024-05-02

## 2024-05-02 NOTE — TELEPHONE ENCOUNTER
DESIREE to return call to the office. Provided pt office phone [phone] along with office hours.       Marie Alvarado DO  5/1/2024 10:50 PM CDT Back to Top      Results reviewed. Released to [facility].    Jose Lynn,  I have reviewed your test results.  Tests show no significant abnormality.  Thyroid nodules have resolved.  No further imaging is recommended.  Good news.  Sincerely,  DO Marie Cabral DO  5/1/2024  9:43 PM CDT Back to Top      Results reviewed. Released to [employer]. Will discuss with patient at next visit.  Jose Lynn,  I have reviewed your test results.  Hemoglobin A1c indicates diabetes is controlled.  Please start your Mounjaro to help maintain glucose control and facilitate weight loss.  Please start checking your blood sugars once daily fasting 1 day and another day 2 hours postprandial after a meal.  Cholesterol LDL could improve.  Goal for diabetics is LDL less than 70.  Please make sure you are taking your cholesterol medication rosuvastatin nightly and not missing doses.  Urine microalbumin is negative.     CBC indicates anemia which is concerning.  Please complete iron studies nonfasting at [facility] in the next couple of weeks.  If it indicates iron deficiency anemia then you will need to see the gastroenterologist for further evaluation to rule out occult bleeding and GI cancer.  If iron studies do not reveal iron deficiency anemia, you will need to see the hematologist to determine the cause of your new anemia.  Please let me know if you have any questions.  After you complete the iron studies, please schedule an office visit so we can discuss your treatment plan.  This should occur prior to your next diabetic visit in the next 3 to 4 weeks.  Sincerely,  Marie Alvarado DO

## 2024-05-06 NOTE — TELEPHONE ENCOUNTER
LMOM to return call to the office. Provided pt office phone [phone] along with office hours.

## 2024-05-06 NOTE — TELEPHONE ENCOUNTER
LMOM to return call to the office as this is 2nd attempt to reach you. Provided pt office phone [phone] along with office hours.

## 2024-05-07 NOTE — TELEPHONE ENCOUNTER
Spoke with patient she was inquiring about her additional labs that were ordered, ferritin, iron and TIBC.  Informed patient these are non fasting labs and she just needs to schedule an appointment @ [facility] to complete.  She expressed understanding.  She did schedule a follow up appointment with Dr. Alvarado.

## 2024-05-10 LAB
% SATURATION: 10 % (CALC) (ref 16–45)
FERRITIN: 8 NG/ML (ref 16–288)
IRON BINDING CAPACITY: 490 MCG/DL (CALC) (ref 250–450)
IRON, TOTAL: 47 MCG/DL (ref 45–160)

## 2024-05-13 DIAGNOSIS — E11.40 TYPE 2 DIABETES MELLITUS WITH DIABETIC NEUROPATHY, WITHOUT LONG-TERM CURRENT USE OF INSULIN (HCC): ICD-10-CM

## 2024-05-13 RX ORDER — TIRZEPATIDE 2.5 MG/.5ML
INJECTION, SOLUTION SUBCUTANEOUS
Qty: 2 ML | Refills: 0 | OUTPATIENT
Start: 2024-05-13

## 2024-05-13 RX ORDER — TIRZEPATIDE 5 MG/.5ML
INJECTION, SOLUTION SUBCUTANEOUS
Qty: 2 ML | Refills: 11 | Status: SHIPPED | OUTPATIENT
Start: 2024-05-13

## 2024-05-13 NOTE — TELEPHONE ENCOUNTER
Refill Passed Protocol:     Pt requesting refill of   Requested Prescriptions     Pending Prescriptions Disp Refills    MOUNJARO 5 MG/0.5ML Subcutaneous Solution Pen-injector [Pharmacy Med Name: MOUNJARO 5 MG/0.5ML Solution Pen-injector] 2 mL 11     Sig: INJECT 5MG (1 PEN) UNDER THE SKIN EVERY WEEK     Refused Prescriptions Disp Refills    MOUNJARO 2.5 MG/0.5ML Subcutaneous Solution Pen-injector [Pharmacy Med Name: MOUNJARO 2.5 MG/0.5ML Solution Pen-injector]  11     Sig: INJECT 2.5MG (1 PEN) UNDER THE SKIN EVERY WEEK FOR 4 DOSES     Refill was approved and sent to pharmacy:   -Increase    metFORMIN HCl; Take 4 tablets (2,000 mg total) by mouth daily with breakfast.  Dispense: 360 tablet; Refill: 1  -    Start Mounjaro; Inject 2.5 mg into the skin once a week for 4 doses.  Dispense: 2 mL; Refill: 0  -After 4 weeks increase Rx start   Mounjaro; Inject 5 mg into the skin once a week.  Dispense: 2 mL; Refill: 0  Controlled by history  Start Mounjaro  Last Time Medication was Filled:  5/14/2024    Last Office Visit with Provider: 4/15/2024    Appt. scheduled on 5/23/2024

## 2024-05-23 ENCOUNTER — OFFICE VISIT (OUTPATIENT)
Dept: FAMILY MEDICINE CLINIC | Facility: CLINIC | Age: 74
End: 2024-05-23

## 2024-05-23 VITALS
RESPIRATION RATE: 18 BRPM | SYSTOLIC BLOOD PRESSURE: 126 MMHG | BODY MASS INDEX: 40.01 KG/M2 | OXYGEN SATURATION: 97 % | HEIGHT: 64 IN | HEART RATE: 88 BPM | WEIGHT: 234.38 LBS | TEMPERATURE: 97 F | DIASTOLIC BLOOD PRESSURE: 70 MMHG

## 2024-05-23 DIAGNOSIS — D50.9 MICROCYTIC ANEMIA: ICD-10-CM

## 2024-05-23 DIAGNOSIS — E78.2 MIXED HYPERLIPIDEMIA: ICD-10-CM

## 2024-05-23 DIAGNOSIS — D50.9 IRON DEFICIENCY ANEMIA, UNSPECIFIED IRON DEFICIENCY ANEMIA TYPE: Primary | ICD-10-CM

## 2024-05-23 DIAGNOSIS — E11.40 TYPE 2 DIABETES MELLITUS WITH DIABETIC NEUROPATHY, WITHOUT LONG-TERM CURRENT USE OF INSULIN (HCC): ICD-10-CM

## 2024-05-23 PROCEDURE — 3008F BODY MASS INDEX DOCD: CPT | Performed by: FAMILY MEDICINE

## 2024-05-23 PROCEDURE — 1170F FXNL STATUS ASSESSED: CPT | Performed by: FAMILY MEDICINE

## 2024-05-23 PROCEDURE — 3044F HG A1C LEVEL LT 7.0%: CPT | Performed by: FAMILY MEDICINE

## 2024-05-23 PROCEDURE — 1126F AMNT PAIN NOTED NONE PRSNT: CPT | Performed by: FAMILY MEDICINE

## 2024-05-23 PROCEDURE — 1159F MED LIST DOCD IN RCRD: CPT | Performed by: FAMILY MEDICINE

## 2024-05-23 PROCEDURE — 3061F NEG MICROALBUMINURIA REV: CPT | Performed by: FAMILY MEDICINE

## 2024-05-23 PROCEDURE — 1160F RVW MEDS BY RX/DR IN RCRD: CPT | Performed by: FAMILY MEDICINE

## 2024-05-23 PROCEDURE — 3078F DIAST BP <80 MM HG: CPT | Performed by: FAMILY MEDICINE

## 2024-05-23 PROCEDURE — 3074F SYST BP LT 130 MM HG: CPT | Performed by: FAMILY MEDICINE

## 2024-05-23 PROCEDURE — 99214 OFFICE O/P EST MOD 30 MIN: CPT | Performed by: FAMILY MEDICINE

## 2024-05-23 RX ORDER — EZETIMIBE 10 MG/1
10 TABLET ORAL NIGHTLY
Qty: 90 TABLET | Refills: 0 | Status: SHIPPED | OUTPATIENT
Start: 2024-05-23

## 2024-05-23 NOTE — PROGRESS NOTES
CHIEF COMPLAINT:   Chief Complaint   Patient presents with    Follow - Up     Discuss labs results          HPI:     Leah Parsons is a 73 year old female presents for discuss results.    New iron deficiency anemia- had colonoscopy 5/23/23 Dr. Balwinder Mayberry -normal except for hemorrhoids.  Denies any vaginal bleeding, rectal bleeding, stomach pain or regular NSAID use.  Not vegetarian.  On aspirin.  Denies headache, dizziness, chest pain, palpitations, dyspnea exertion, shortness of breath.    Type 2 diabetes- on mounjaro 2.5mg weekly for 4 weeksl lost 21#. Denies medication side effects with mounjaro except loss of appetite. FBS -. 2hpp- .  Diet-trying to eat less carb and sugar.  States food pantry has been dropping off a lot of sweets and she has been giving to the unit she lives in not needing it.  Trying to walk  10 mins daily working up to 30 mins daily. Denies burning in feet or loss of sensation. Denies feeling depressed or sad. Feels hopeful.  Last urine microalbumin 4/27/24- normal. Annual eye exam due 6/2024- dr Oppenheim- no DBR.-Has appointment scheduled with Dr. Oppenheim needs referral.    Pt presents for recheck of hyperlipidemia. Patient reports taking medications as instructed, no medication side effects noted. Denies any generalized muscle aches.  Denies missing doses atorvastatin 40 mg      Had measles as a child    Wt Readings from Last 6 Encounters:   05/23/24 234 lb 6.4 oz (106.3 kg)   04/15/24 255 lb 12.8 oz (116 kg)   08/22/23 223 lb (101.2 kg)   07/19/23 218 lb 6.4 oz (99.1 kg)   05/26/23 209 lb 3.2 oz (94.9 kg)   04/13/23 211 lb 12.8 oz (96.1 kg)       HISTORY:  Past Medical History:    Acute deep vein thrombosis (DVT) of tibial vein of left lower extremity (HCC)    Acute deep vein thrombosis (DVT) of tibial vein of left lower extremity (HCC)    Off Xarelto since 11/2019-managed by Dr. Rosenbaum-states D-dimer was elevated due to persistent OA and risk of anticoagulant  was greater then treating a very small posterior tibial clot.  denies any calf pain or leg swelling.    Acute recurrent ethmoidal sinusitis    Allergic rhinitis    Anxiety    Arthritis    Aspiration pneumonia of both lungs ([facility])    Hospitalized [facility] with Parainfluenza virus 2021    Bursitis, shoulder, left    Depression    Diabetes (HCC)    DVT (deep venous thrombosis) ([facility])    left leg posterior tibial    Epigastric pain    Hx of gastritis- last EGD -Dr. Diane     Esophageal reflux    last EGD [id number]    Essential hypertension    Extrinsic asthma, unspecified    HEADACHES    uses depakote    History of colonoscopy with polypectomy    hemorrhoids- repeat colonscopy 2017 negative- repeat in 5 years.    Hypothyroidism    acquired. no cancer or nodules    Impingement syndrome, shoulder, left    Leg length discrepancy    Migraines    Normal vaginal delivery ([facility])    ,    Obesity    Osteoarthritis    Other and unspecified hyperlipidemia    Parainfluenza virus laryngotracheobronchitis    Last Assessment & Plan:  Formatting of this note might be different from the original. Not requiring any oxygen at this time Continue cough meds Continue neb therapy    Prediabetes    Right hip impingement syndrome    Right sided sciatica    Shoulder pain, left    Sleep apnea    Thyroid condition    Unspecified sleep apnea    uses Cpap      Past Surgical History:   Procedure Laterality Date    Appendectomy  1964    Back surgery  2021    Injection for spinal stenosis & rods placed    Cholecystectomy      Colonoscopy      [facility]- diverticulosis , polyps. had EGD same time    Colonoscopy N/A 2017    Procedure: COLONOSCOPY;  Surgeon: Deep Diane MD;  Location:  ENDOSCOPY    Knee replacement surgery      to the right knee only    Knee surgery  3/8/2015     Knee surgery  2015     3 knee surgeries, replacement and arthoscopy       1977    Other surgical history  2012     right arm carpal tunnel      Family History   Problem Relation Age of Onset    Hypertension Father     Dementia Father     Hypertension Mother     Lipids Mother     Diabetes Mother     Diabetes Maternal Grandmother     Cancer Paternal Grandmother     Hypertension Paternal Grandmother     Obesity Paternal Grandmother     Heart Disorder Maternal Uncle       Social History:   Social History     Socioeconomic History    Marital status:    Tobacco Use    Smoking status: Never    Smokeless tobacco: Never   Vaping Use    Vaping status: Never Used   Substance and Sexual Activity    Alcohol use: No    Drug use: No   Other Topics Concern    Caffeine Concern No    Exercise Yes    Seat Belt Yes    Special Diet No    Stress Concern Yes    Weight Concern Yes     Social Determinants of Health     Financial Resource Strain: Not At Risk (11/17/2021)    [facility]    Financial Resource Strain     How hard is it for you to pay for the very basics like food, housing, medical care, and heating?: Not hard at all   Food Insecurity: No Food Insecurity (11/17/2021)    [facility]    Food Insecurity     Currently or in the past 3 months, have you worried your food would run out before you had money to buy more?: No     In the past 12 months, have you run out of food or been unable to get more?: No   Transportation Needs: No Transportation Needs (11/17/2021)    [facility] from [facility]    Transportation Needs     Medical Transportation Needs?: Patient refused     Daily Living Transportation Needs? [Peds Only] : Patient refused    Received from [facility], [facility]    Social Connections    Received from [facility], [facility]    Housing Stability        Medications (Active  prior to today's visit):  Current Outpatient Medications   Medication Sig Dispense Refill    Tirzepatide (MOUNJARO) 5 MG/0.5ML Subcutaneous Solution Pen-injector INJECT 5MG (1 PEN) UNDER THE SKIN EVERY WEEK 2 mL 11    fluticasone propionate 50 MCG/ACT Nasal Suspension 2 sprays by Nasal route daily. 48 g 3    gabapentin 300 MG Oral Cap Take 1 capsule (300 mg total) by mouth nightly. 90 capsule 1    metFORMIN 500 MG Oral Tab Take 4 tablets (2,000 mg total) by mouth daily with breakfast. 360 tablet 1    montelukast 10 MG Oral Tab Take 1 tablet (10 mg total) by mouth daily. 90 tablet 2    omeprazole 20 MG Oral Capsule Delayed Release TAKE 1 CAPSULE EVERY MORNING ON AN EMPTY STOMACH 90 capsule 3    valsartan 80 MG Oral Tab Take 1 tablet (80 mg total) by mouth daily. 90 tablet 1    atorvastatin 40 MG Oral Tab Take 1 tablet (40 mg total) by mouth nightly. 90 tablet 0    clonazePAM 0.5 MG Oral Tab       divalproex  MG Oral Tablet 24 Hr Take 1 tablet (250 mg total) by mouth daily. 90 tablet 3    Glucose Blood (TRUE METRIX BLOOD GLUCOSE TEST) In Vitro Strip 1 strip by In Vitro route daily. 100 strip 3    TRUEPLUS LANCETS 33G Does not apply Misc TEST BLOOD SUGAR ONE TIME DAILY AS DIRECTED 100 each 3    LEVOTHYROXINE 88 MCG Oral Tab TAKE 1 TABLET BEFORE BREAKFAST 90 tablet 3    Azelastine HCl 137 MCG/SPRAY Nasal Solution       sertraline 100 MG Oral Tab Take 1 tablet (100 mg total) by mouth nightly. 90 tablet 0    buPROPion  MG Oral Tablet 24 Hr Take 1 tablet (300 mg total) by mouth every morning. 90 tablet 0    CRANBERRY OR Take by mouth daily.      CALCIUM CITRATE-VITAMIN D3 OR Take by mouth As Directed.      Aspirin-Acetaminophen-Caffeine (EXCEDRIN EXTRA STRENGTH OR) Take 2 tablets by mouth every 6 (six) hours as needed.      Spacer/Aero-Holding Chambers ([facility]) Does not apply Misc Use as directed   0    Probiotic Product (PROBIOTIC OR) Take 1 capsule by mouth every morning. Indications: supplement       Cholecalciferol (VITAMIN D3) 1000 UNITS Oral Cap Take 2 capsules (2,000 Units total) by mouth daily.         Allergies:  Allergies   Allergen Reactions    Sulfa Antibiotics UNKNOWN     Carried over from childhood       PSFH elements reviewed from today and agreed except as otherwise stated in HPI.  PHYSICAL EXAM:   /70 (BP Location: Left arm, Patient Position: Sitting, Cuff Size: large)   Pulse 88   Temp 97.3 °F (36.3 °C) (Temporal)   Resp 18   Ht 5' 4\" (1.626 m)   Wt 234 lb 6.4 oz (106.3 kg)   SpO2 97%   BMI 40.23 kg/m²   Vital signs reviewed.Appears stated age, well groomed.  Physical Exam   GENERAL: well developed, well nourished,in no apparent distress  EYES; PERRLA, EOM-i B/L. Sclera clear and non icteric bilateral  SKIN: no rashes,no suspicious lesions  HEENT: atraumatic, normocephalic  NECK: supple,no adenopathy,no bruits, no JVD  LUNGS: clear to auscultation bilateral. No RRW. Good inspiratory and expiratory effort  CARDIO: RRR without murmur, clear S1, S2  VS: no carotid artery bruit bilateral.    GI: good BS's,no masses,No HSM or tenderness.   EXTREMITIES: no cyanosis, clubbing or edema, bilateral. No calf tenderness    LABS     Orders Only on 05/01/2024   Component Date Value    IRON, TOTAL 05/09/2024 47     IRON BINDING CAPACITY 05/09/2024 490 (H)     % SATURATION 05/09/2024 10 (L)     FERRITIN 05/09/2024 8 (L)    Office Visit on 04/15/2024   Component Date Value    CHOLESTEROL, TOTAL 04/27/2024 169     HDL CHOLESTEROL 04/27/2024 62     TRIGLYCERIDES 04/27/2024 146     LDL-CHOLESTEROL 04/27/2024 82     CHOL/HDLC RATIO 04/27/2024 2.7     NON-HDL CHOLESTEROL 04/27/2024 107     GLUCOSE 04/27/2024 115 (H)     UREA NITROGEN (BUN) 04/27/2024 18     CREATININE 04/27/2024 0.88     EGFR 04/27/2024 69     BUN/CREATININE RATIO 04/27/2024 SEE NOTE:     SODIUM 04/27/2024 140     POTASSIUM 04/27/2024 5.1     CHLORIDE 04/27/2024 102     CARBON DIOXIDE 04/27/2024 26     CALCIUM 04/27/2024 9.9      PROTEIN, TOTAL 04/27/2024 7.2     ALBUMIN 04/27/2024 4.6     GLOBULIN 04/27/2024 2.6     ALBUMIN/GLOBULIN RATIO 04/27/2024 1.8     BILIRUBIN, TOTAL 04/27/2024 0.5     ALKALINE PHOSPHATASE 04/27/2024 96     AST 04/27/2024 21     ALT 04/27/2024 24     HEMOGLOBIN A1c 04/27/2024 6.8 (H)     CREATININE, RANDOM URINE 04/27/2024 26     MICROALBUMIN 04/27/2024 0.5     MICROALBUMIN/CREATININE * 04/27/2024 19     WHITE BLOOD CELL COUNT 04/27/2024 6.6     RED BLOOD CELL COUNT 04/27/2024 4.99     HEMOGLOBIN 04/27/2024 11.6 (L)     HEMATOCRIT 04/27/2024 39.2     MCV 04/27/2024 78.6 (L)     MCH 04/27/2024 23.2 (L)     MCHC 04/27/2024 29.6 (L)     RDW 04/27/2024 15.2 (H)     PLATELET COUNT 04/27/2024 392     MPV 04/27/2024 9.7     ABSOLUTE NEUTROPHILS 04/27/2024 3,188     ABSOLUTE LYMPHOCYTES 04/27/2024 2,633     ABSOLUTE MONOCYTES 04/27/2024 449     ABSOLUTE EOSINOPHILS 04/27/2024 271     ABSOLUTE BASOPHILS 04/27/2024 59     NEUTROPHILS 04/27/2024 48.3     LYMPHOCYTES 04/27/2024 39.9     MONOCYTES 04/27/2024 6.8     EOSINOPHILS 04/27/2024 4.1     BASOPHILS 04/27/2024 0.9     TSH W/REFLEX TO FT4 04/27/2024 1.92       REVIEWED THIS VISIT  ASSESSMENT/PLAN:   73 year old female with    1. Iron deficiency anemia, unspecified iron deficiency anemia type  2. Microcytic anemia  - GASTRO - INTERNAL  Etiology unclear.  Not vegetarian no sources of bleeding  Normal colonoscopy except for hemorrhoids 5/23/2023 with Dr. Balwinder Deluca  Needs GI workup to rule out occult bleeding and malignancy  Patient understands  Will schedule GI consult    3. Mixed hyperlipidemia  -Start ezetimibe 10 MG Oral Tab; Take 1 tablet (10 mg total) by mouth nightly.  Dispense: 90 tablet; Refill: 0  Uncontrolled  LDL goal is less than 70  Not missing doses atorvastatin 40 mg  Start ezetimibe  Risks and benefits of ezetimibe discussed risk of hepatitis, muscle breakdown/rhabdomyolysis.  if joint pain or myalgias,or other side effects, stop medication immediately and  call office.  Encouraged weight loss, Mediterranean diet, start walking goal is 30 minutes daily work up to this goal.  Call if any side effects or concerns with the medication  Follow-up in 3 months and complete labs prior to visit      4. Type 2 diabetes mellitus with diabetic neuropathy, without long-term current use of insulin ([facility])  - Ophthalmology Referral - In Network  Increase Mounjaro to 5 mg weekly  Already lost 21 pounds and 4 weeks  No medication side effects  Keep June 2024 scheduled eye exam with Dr. Oppenheim  Encouraged low-carb/low sugar Mediterranean diet  Start walking 30 minutes daily-workup to this  Recheck in 3 months labs and follow-up in office    Meds This Visit:  Requested Prescriptions     Signed Prescriptions Disp Refills    ezetimibe 10 MG Oral Tab 90 tablet 0     Sig: Take 1 tablet (10 mg total) by mouth nightly.       Health Maintenance:  Health Maintenance   Topic Date Due    COVID-19 Vaccine (6 - 2023-24 season) 03/17/2024    Diabetes Care Dilated Eye Exam  06/23/2024    Mammogram  08/08/2024    Diabetes Care A1C  10/27/2024    Diabetes Care Foot Exam  04/15/2025    Diabetes Care: GFR  04/27/2025    Diabetes Care: Microalb/Creat Ratio  04/28/2025    Colorectal Cancer Screening  05/23/2028    Influenza Vaccine  Completed    DEXA Scan  Completed    MA Annual Health Assessment  Completed    Annual Depression Screening  Completed    Fall Risk Screening (Annual)  Completed    Pneumococcal Vaccine: 65+ Years  Completed    Zoster Vaccines  Completed         Patient/Caregiver Education: Patient/Caregiver Education: There are no barriers to learning. Medical education done.   Outcome: Patient verbalizes understanding. Patient is notified to call with any questions, comp lications, allergies, or worsening or changing symptoms.  Patient is to call with any side effects or complications from the treatments as a result of today.     Problem List:     Patient Active Problem List   Diagnosis     History of gastritis    Left carpal tunnel syndrome    YVONNE on CPAP    Irritable colon    Diverticulosis of large intestine without hemorrhage    Acquired hypothyroidism    Benign essential HTN    Major depression in full remission (HCC)    Chronic migraine without aura without status migrainosus, not intractable    History of colonoscopy with polypectomy    DNR (do not resuscitate)    Nonallergic rhinitis    BMI 35.0-35.9,adult    History of DVT of lower extremity    Osteoarthritis of left knee    Chondromalacia of right hip    Generalized anxiety disorder    Spinal stenosis of lumbar region with neurogenic claudication    Chronic knee pain after total replacement of right knee joint    Lumbar radiculitis    Gastro-esophageal reflux disease without esophagitis    Mixed hyperlipidemia    Type 2 diabetes mellitus with diabetic neuropathy, unspecified (HCC)    Unspecified hearing loss, bilateral    History of aspiration pneumonia    RLS (restless legs syndrome)    Chronic ethmoidal sinusitis    Severe obesity (BMI 35.0-39.9) with comorbidity (HCC)    Microcytic anemia       Imaging & Referrals:  None     5/23/2024  Marie Alvarado DO      Patient understands plan and follow-up.  Return in about 3 months (around 8/23/2024) for HTN,HL,DM, discuss labs, sooner if needed..

## 2024-05-31 DIAGNOSIS — E11.40 TYPE 2 DIABETES MELLITUS WITH DIABETIC NEUROPATHY, WITHOUT LONG-TERM CURRENT USE OF INSULIN (HCC): ICD-10-CM

## 2024-05-31 NOTE — TELEPHONE ENCOUNTER
Patient would like her prescription for Monjaro redone she needs to be 1 month supply with 4 refills it is cheaper for her to pay out of pocket this way.

## 2024-06-05 RX ORDER — TIRZEPATIDE 5 MG/.5ML
5 INJECTION, SOLUTION SUBCUTANEOUS WEEKLY
Qty: 2 ML | Refills: 4 | Status: SHIPPED | OUTPATIENT
Start: 2024-06-05

## 2024-06-27 ENCOUNTER — TELEPHONE (OUTPATIENT)
Dept: FAMILY MEDICINE CLINIC | Facility: CLINIC | Age: 74
End: 2024-06-27

## 2024-06-27 ENCOUNTER — HOSPITAL ENCOUNTER (OUTPATIENT)
Dept: BONE DENSITY | Age: 74
Discharge: HOME | End: 2024-06-27
Attending: FAMILY MEDICINE
Payer: MEDICARE

## 2024-06-27 DIAGNOSIS — Z00.00 ENCOUNTER FOR ANNUAL HEALTH EXAMINATION: ICD-10-CM

## 2024-06-27 DIAGNOSIS — Z78.0 ASYMPTOMATIC MENOPAUSE: ICD-10-CM

## 2024-06-27 PROCEDURE — 77080 DXA BONE DENSITY AXIAL: CPT | Performed by: FAMILY MEDICINE

## 2024-06-27 NOTE — TELEPHONE ENCOUNTER
Spoke with patient she reports for the last month she has been getting dizzy when changing position from sitting to standing.  She denies any other symptoms.  Appointment scheduled for tomorrow with Dr. Alvarado for evaluation.

## 2024-06-27 NOTE — TELEPHONE ENCOUNTER
Pt called office stating that when she goes to the bathroom she is getting dizzy and want to know what to do. Pt states this is not constant but when she gets up slowly she gets dizzy or light headed and wants to know if she should be concerned or if she should be doing something about that.

## 2024-06-28 ENCOUNTER — OFFICE VISIT (OUTPATIENT)
Dept: FAMILY MEDICINE CLINIC | Facility: CLINIC | Age: 74
End: 2024-06-28

## 2024-06-28 VITALS
DIASTOLIC BLOOD PRESSURE: 70 MMHG | HEIGHT: 64 IN | BODY MASS INDEX: 40.43 KG/M2 | HEART RATE: 103 BPM | TEMPERATURE: 98 F | WEIGHT: 236.81 LBS | SYSTOLIC BLOOD PRESSURE: 136 MMHG | OXYGEN SATURATION: 97 % | RESPIRATION RATE: 16 BRPM

## 2024-06-28 DIAGNOSIS — R53.83 OTHER FATIGUE: Primary | ICD-10-CM

## 2024-06-28 DIAGNOSIS — G47.33 OSA ON CPAP: ICD-10-CM

## 2024-06-28 DIAGNOSIS — R42 DIZZINESS: ICD-10-CM

## 2024-06-28 DIAGNOSIS — E11.40 TYPE 2 DIABETES MELLITUS WITH DIABETIC NEUROPATHY, WITHOUT LONG-TERM CURRENT USE OF INSULIN (HCC): ICD-10-CM

## 2024-06-28 PROCEDURE — 3075F SYST BP GE 130 - 139MM HG: CPT | Performed by: FAMILY MEDICINE

## 2024-06-28 PROCEDURE — 1126F AMNT PAIN NOTED NONE PRSNT: CPT | Performed by: FAMILY MEDICINE

## 2024-06-28 PROCEDURE — 3078F DIAST BP <80 MM HG: CPT | Performed by: FAMILY MEDICINE

## 2024-06-28 PROCEDURE — 1159F MED LIST DOCD IN RCRD: CPT | Performed by: FAMILY MEDICINE

## 2024-06-28 PROCEDURE — 1170F FXNL STATUS ASSESSED: CPT | Performed by: FAMILY MEDICINE

## 2024-06-28 PROCEDURE — 1160F RVW MEDS BY RX/DR IN RCRD: CPT | Performed by: FAMILY MEDICINE

## 2024-06-28 PROCEDURE — 3008F BODY MASS INDEX DOCD: CPT | Performed by: FAMILY MEDICINE

## 2024-06-28 PROCEDURE — 99214 OFFICE O/P EST MOD 30 MIN: CPT | Performed by: FAMILY MEDICINE

## 2024-06-28 NOTE — PROGRESS NOTES
CHIEF COMPLAINT:   Chief Complaint   Patient presents with    Dizziness     Patient c/o of fatigue and dizziness, light head x on and off for 1 month     HPI:     Leah Parsons is a 73 year old female presents for complaining of episodes of becoming lightheaded from sitting to standing and feeling fatigue and having to lie down for 30 minutes about every 4 days for the past month.  Patient was started on Mounjaro 8 weeks ago is on 5 mg and also on metformin ER 2000 mg total a day.  Fasting blood sugars have been 97-1 04 but she has not been checking her blood sugars during the episodes.  She has not checked her blood pressure.  There is been no other new medication changes.  She has no associated shortness of breath, MENDOZA, chest pain, palpitations, syncope or near syncope.  Denies any vertigo or room spinning sensation.  She has been drinking a 30 ounce container of water several times a day.  Urinating normally.  Denies any nausea or vomiting.  Does use a CPAP had an appointment to see her pulmonologist but had to reschedule.  Has lost 20 pounds since her last visit since starting Mounjaro over 8 weeks.      BP Readings from Last 5 Encounters:   06/28/24 136/70   05/23/24 126/70   04/15/24 126/78   08/22/23 98/54   07/19/23 118/70         Wt Readings from Last 6 Encounters:   06/28/24 236 lb 12.8 oz (107.4 kg)   05/23/24 234 lb 6.4 oz (106.3 kg)   04/15/24 255 lb 12.8 oz (116 kg)   08/22/23 223 lb (101.2 kg)   07/19/23 218 lb 6.4 oz (99.1 kg)   05/26/23 209 lb 3.2 oz (94.9 kg)     HISTORY:  Past Medical History:    Acute deep vein thrombosis (DVT) of tibial vein of left lower extremity (HCC)    Acute deep vein thrombosis (DVT) of tibial vein of left lower extremity (HCC)    Off Xarelto since 11/2019-managed by Dr. Rosenbaum-states D-dimer was elevated due to persistent OA and risk of anticoagulant was greater then treating a very small posterior tibial clot.  denies any calf pain or leg swelling.    Acute  recurrent ethmoidal sinusitis    Allergic rhinitis    Anxiety    Arthritis    Aspiration pneumonia of both lungs ([facility])    Hospitalized [facility] with Parainfluenza virus 2021    Bursitis, shoulder, left    Depression    Diabetes ([facility])    DVT (deep venous thrombosis) ([facility])    left leg posterior tibial    Epigastric pain    Hx of gastritis- last EGD -Dr. Diane     Esophageal reflux    last EGD [id number]    Essential hypertension    Extrinsic asthma, unspecified    HEADACHES    uses depakote    History of colonoscopy with polypectomy    hemorrhoids- repeat colonscopy 2017 negative- repeat in 5 years.    Hypothyroidism    acquired. no cancer or nodules    Impingement syndrome, shoulder, left    Leg length discrepancy    Migraines    Normal vaginal delivery ([facility])    ,    Obesity    Osteoarthritis    Other and unspecified hyperlipidemia    Parainfluenza virus laryngotracheobronchitis    Last Assessment & Plan:  Formatting of this note might be different from the original. Not requiring any oxygen at this time Continue cough meds Continue neb therapy    Prediabetes    Right hip impingement syndrome    Right sided sciatica    Shoulder pain, left    Sleep apnea    Thyroid condition    Unspecified sleep apnea    uses Cpap      Past Surgical History:   Procedure Laterality Date    Appendectomy  1964    Back surgery  2021    Injection for spinal stenosis & rods placed    Cholecystectomy      Colonoscopy      [place]- diverticulosis , polyps. had EGD same time    Colonoscopy N/A 2017    Procedure: COLONOSCOPY;  Surgeon: Deep Diane MD;  Location:  ENDOSCOPY    Knee replacement surgery      to the right knee only    Knee surgery  3/8/2015     Knee surgery  2015     3 knee surgeries, replacement and arthoscopy       1977    Other surgical history  2012    right arm carpal tunnel      Family History   Problem Relation Age of Onset    Hypertension Father      Dementia Father     Hypertension Mother     Lipids Mother     Diabetes Mother     Diabetes Maternal Grandmother     Cancer Paternal Grandmother     Hypertension Paternal Grandmother     Obesity Paternal Grandmother     Heart Disorder Maternal Uncle       Social History:   Social History     Socioeconomic History    Marital status:    Tobacco Use    Smoking status: Never    Smokeless tobacco: Never   Vaping Use    Vaping status: Never Used   Substance and Sexual Activity    Alcohol use: No    Drug use: No   Other Topics Concern    Caffeine Concern No    Exercise Yes    Seat Belt Yes    Special Diet No    Stress Concern Yes    Weight Concern Yes     Social Determinants of Health     Financial Resource Strain: Not At Risk (11/17/2021)    [facility]    Financial Resource Strain     How hard is it for you to pay for the very basics like food, housing, medical care, and heating?: Not hard at all   Food Insecurity: No Food Insecurity (11/17/2021)    [facility]    Food Insecurity     Currently or in the past 3 months, have you worried your food would run out before you had money to buy more?: No     In the past 12 months, have you run out of food or been unable to get more?: No   Transportation Needs: No Transportation Needs (11/17/2021)    [facility]    Transportation Needs     Medical Transportation Needs?: Patient refused     Daily Living Transportation Needs? [Peds Only] : Patient refused    [facility]    Social Connections    [facility]    Housing Stability        Medications (Active prior to today's visit):  Current Outpatient Medications   Medication Sig Dispense Refill    metFORMIN 500  MG Oral Tab Take 2 tablets (1,000 mg total) by mouth daily with breakfast. 180 tablet 0    Tirzepatide (MOUNJARO) 5 MG/0.5ML Subcutaneous Solution Pen-injector Inject 5 mg into the skin once a week. 2 mL 4    ezetimibe 10 MG Oral Tab Take 1 tablet (10 mg total) by mouth nightly. 90 tablet 0    fluticasone propionate 50 MCG/ACT Nasal Suspension 2 sprays by Nasal route daily. 48 g 3    gabapentin 300 MG Oral Cap Take 1 capsule (300 mg total) by mouth nightly. 90 capsule 1    montelukast 10 MG Oral Tab Take 1 tablet (10 mg total) by mouth daily. 90 tablet 2    omeprazole 20 MG Oral Capsule Delayed Release TAKE 1 CAPSULE EVERY MORNING ON AN EMPTY STOMACH 90 capsule 3    valsartan 80 MG Oral Tab Take 1 tablet (80 mg total) by mouth daily. 90 tablet 1    atorvastatin 40 MG Oral Tab Take 1 tablet (40 mg total) by mouth nightly. 90 tablet 0    clonazePAM 0.5 MG Oral Tab       divalproex  MG Oral Tablet 24 Hr Take 1 tablet (250 mg total) by mouth daily. 90 tablet 3    Glucose Blood (TRUE METRIX BLOOD GLUCOSE TEST) In Vitro Strip 1 strip by In Vitro route daily. 100 strip 3    TRUEPLUS LANCETS 33G Does not apply Misc TEST BLOOD SUGAR ONE TIME DAILY AS DIRECTED 100 each 3    LEVOTHYROXINE 88 MCG Oral Tab TAKE 1 TABLET BEFORE BREAKFAST 90 tablet 3    Azelastine HCl 137 MCG/SPRAY Nasal Solution       sertraline 100 MG Oral Tab Take 1 tablet (100 mg total) by mouth nightly. 90 tablet 0    buPROPion  MG Oral Tablet 24 Hr Take 1 tablet (300 mg total) by mouth every morning. 90 tablet 0    CRANBERRY OR Take by mouth daily.      CALCIUM CITRATE-VITAMIN D3 OR Take by mouth As Directed.      Aspirin-Acetaminophen-Caffeine (EXCEDRIN EXTRA STRENGTH OR) Take 2 tablets by mouth every 6 (six) hours as needed.      Spacer/Aero-Holding Chambers ([facility]) Does not apply Misc Use as directed   0    Probiotic Product (PROBIOTIC OR) Take 1 capsule by mouth every morning. Indications: supplement      Cholecalciferol  (VITAMIN D3) 1000 UNITS Oral Cap Take 2 capsules (2,000 Units total) by mouth daily.         Allergies:  Allergies   Allergen Reactions    Sulfa Antibiotics UNKNOWN     Carried over from childhood       PSFH elements reviewed from today and agreed except as otherwise stated in HPI.  PHYSICAL EXAM:   /70 (BP Location: Left arm, Patient Position: Sitting, Cuff Size: large)   Pulse 103   Temp 97.7 °F (36.5 °C) (Temporal)   Resp 16   Ht 5' 4\" (1.626 m)   Wt 236 lb 12.8 oz (107.4 kg)   SpO2 97%   BMI 40.65 kg/m²   Vital signs reviewed.Appears stated age, well groomed.  Physical Exam   GENERAL: well developed, well nourished,in no apparent distress  EYES; PERRLA, EOM-i B/L. Sclera clear and non icteric bilateral  SKIN: no rashes,no suspicious lesions  HEENT: atraumatic, normocephalic  NECK: supple,no adenopathy,no bruits, no JVD  LUNGS: clear to auscultation bilateral. No RRW. Good inspiratory and expiratory effort  CARDIO: RRR without murmur, clear S1, S2  VS: no carotid artery bruit bilateral.    GI: good BS's,no masses,No HSM or tenderness.   EXTREMITIES: no cyanosis, clubbing or edema, bilateral. No calf tenderness    LABS        REVIEWED THIS VISIT  ASSESSMENT/PLAN:   73 year old female with    1. Other fatigue  Schedule pulmonologist consult to recheck her CPAP settings with fatigue  Sees psychiatrist for depression does not feel depressed or sad or hopeless.  Taking all meds no new psych meds.    Needs to check blood sugars 1 feels fatigued      2. Dizziness  - CBC [6399] [Q]  - COMP METABOLIC PANEL [40904] [Q]  - TSH W REFLEX TO FREE T4 [30749][Q]  - FERRITIN [457] [Q]  Etiology is unclear low blood sugar, dehydration, hypotension but blood pressure has been normal, medication side effect.  When episodes occurred needs to check blood sugar, keep track of how much water patient is drinking daily.  And also measure blood pressure.  BPs for the past year have been 120/70 or greater  Will decrease  metformin ER to 1000 mg since on Mounjaro 5 mg and the dose was increased in the past 4 weeks when the dizziness started.  Patient will complete labs today to rule out anemia, electrolyte abnormalities, uncontrolled thyroid, developing kidney or liver disease.  Excetra.  Clinically patient's physical exam is normal and she is hemodynamically intact.    3. YVONNE on CPAP  Schedule pulmonologist consult to recheck her CPAP settings with fatigue    4. Type 2 diabetes mellitus with diabetic neuropathy, without long-term current use of insulin (HCC)  -Decrease metFORMIN 500 MG Oral Tab; Take 2 tablets (1,000 mg total) by mouth daily with breakfast.  Dispense: 180 tablet; Refill: 0  Mounjaro is increased 4 weeks ago when symptoms started to 5 mg  Possibly due to low blood sugars    Meds This Visit:  Requested Prescriptions     Signed Prescriptions Disp Refills    metFORMIN 500 MG Oral Tab 180 tablet 0     Sig: Take 2 tablets (1,000 mg total) by mouth daily with breakfast.       Health Maintenance:  Health Maintenance   Topic Date Due    COVID-19 Vaccine (6 - 2023-24 season) 03/17/2024    Diabetes Care Dilated Eye Exam  06/23/2024    Mammogram  08/08/2024    Diabetes Care A1C  10/27/2024    Diabetes Care Foot Exam  04/15/2025    Diabetes Care: GFR  04/27/2025    Diabetes Care: Microalb/Creat Ratio  04/28/2025    Colorectal Cancer Screening  05/23/2028    Influenza Vaccine  Completed    DEXA Scan  Completed    MA Annual Health Assessment  Completed    Annual Depression Screening  Completed    Fall Risk Screening (Annual)  Completed    Pneumococcal Vaccine: 65+ Years  Completed    Zoster Vaccines  Completed         Patient/Caregiver Education: Patient/Caregiver Education: There are no barriers to learning. Medical education done.   Outcome: Patient verbalizes understanding. Patient is notified to call with any questions, comp lications, allergies, or worsening or changing symptoms.  Patient is to call with any side effects or  complications from the treatments as a result of today.     Problem List:     Patient Active Problem List   Diagnosis    History of gastritis    Left carpal tunnel syndrome    YVONNE on CPAP    Irritable colon    Diverticulosis of large intestine without hemorrhage    Acquired hypothyroidism    Benign essential HTN    Major depression in full remission (HCC)    Chronic migraine without aura without status migrainosus, not intractable    History of colonoscopy with polypectomy    DNR (do not resuscitate)    Nonallergic rhinitis    BMI 35.0-35.9,adult    History of DVT of lower extremity    Osteoarthritis of left knee    Chondromalacia of right hip    Generalized anxiety disorder    Spinal stenosis of lumbar region with neurogenic claudication    Chronic knee pain after total replacement of right knee joint    Lumbar radiculitis    Gastro-esophageal reflux disease without esophagitis    Mixed hyperlipidemia    Type 2 diabetes mellitus with diabetic neuropathy, unspecified (HCC)    Unspecified hearing loss, bilateral    History of aspiration pneumonia    RLS (restless legs syndrome)    Chronic ethmoidal sinusitis    Severe obesity (BMI 35.0-39.9) with comorbidity (HCC)    Microcytic anemia       Imaging & Referrals:  None     6/28/2024  Marie Alvarado,       Patient understands plan and follow-up.  Return in about 4 weeks (around 7/26/2024) for recheck dizziness sooner is needed.

## 2024-06-29 LAB
ABSOLUTE BASOPHILS: 36 CELLS/UL (ref 0–200)
ABSOLUTE EOSINOPHILS: 162 CELLS/UL (ref 15–500)
ABSOLUTE LYMPHOCYTES: 3141 CELLS/UL (ref 850–3900)
ABSOLUTE MONOCYTES: 738 CELLS/UL (ref 200–950)
ABSOLUTE NEUTROPHILS: 4923 CELLS/UL (ref 1500–7800)
ALBUMIN/GLOBULIN RATIO: 1.7 (CALC) (ref 1–2.5)
ALBUMIN: 4.7 G/DL (ref 3.6–5.1)
ALKALINE PHOSPHATASE: 81 U/L (ref 37–153)
ALT: 19 U/L (ref 6–29)
AST: 16 U/L (ref 10–35)
BASOPHILS: 0.4 %
BILIRUBIN, TOTAL: 0.5 MG/DL (ref 0.2–1.2)
BUN: 15 MG/DL (ref 7–25)
CALCIUM: 10.2 MG/DL (ref 8.6–10.4)
CARBON DIOXIDE: 25 MMOL/L (ref 20–32)
CHLORIDE: 100 MMOL/L (ref 98–110)
CREATININE: 0.77 MG/DL (ref 0.6–1)
EGFR: 81 ML/MIN/1.73M2
EOSINOPHILS: 1.8 %
FERRITIN: 11 NG/ML (ref 16–288)
GLOBULIN: 2.7 G/DL (CALC) (ref 1.9–3.7)
GLUCOSE: 97 MG/DL (ref 65–99)
HEMATOCRIT: 39.5 % (ref 35–45)
HEMOGLOBIN: 12.3 G/DL (ref 11.7–15.5)
LYMPHOCYTES: 34.9 %
MCH: 24.3 PG (ref 27–33)
MCHC: 31.1 G/DL (ref 32–36)
MCV: 77.9 FL (ref 80–100)
MONOCYTES: 8.2 %
MPV: 10.3 FL (ref 7.5–12.5)
NEUTROPHILS: 54.7 %
PLATELET COUNT: 380 THOUSAND/UL (ref 140–400)
POTASSIUM: 5 MMOL/L (ref 3.5–5.3)
PROTEIN, TOTAL: 7.4 G/DL (ref 6.1–8.1)
RDW: 15.8 % (ref 11–15)
RED BLOOD CELL COUNT: 5.07 MILLION/UL (ref 3.8–5.1)
SODIUM: 138 MMOL/L (ref 135–146)
TSH W/REFLEX TO FT4: 1 MIU/L (ref 0.4–4.5)
WHITE BLOOD CELL COUNT: 9 THOUSAND/UL (ref 3.8–10.8)

## 2024-07-06 DIAGNOSIS — E03.9 ACQUIRED HYPOTHYROIDISM: ICD-10-CM

## 2024-07-06 DIAGNOSIS — E78.2 MIXED HYPERLIPIDEMIA: ICD-10-CM

## 2024-07-06 RX ORDER — ATORVASTATIN CALCIUM 40 MG/1
40 TABLET, FILM COATED ORAL NIGHTLY
Qty: 90 TABLET | Refills: 3 | OUTPATIENT
Start: 2024-07-06

## 2024-07-06 RX ORDER — LEVOTHYROXINE SODIUM 88 UG/1
TABLET ORAL
Qty: 90 TABLET | Refills: 0 | Status: SHIPPED | OUTPATIENT
Start: 2024-07-06

## 2024-07-06 NOTE — TELEPHONE ENCOUNTER
Refill Passed Protocol:     Pt requesting refill of   Requested Prescriptions     Pending Prescriptions Disp Refills    ATORVASTATIN 40 MG Oral Tab [Pharmacy Med Name: ATORVASTATIN CALCIUM 40 MG Tablet] 90 tablet 3     Sig: TAKE 1 TABLET EVERY NIGHT    LEVOTHYROXINE 88 MCG Oral Tab [Pharmacy Med Name: LEVOTHYROXINE SODIUM 88 MCG Tablet] 90 tablet 3     Sig: TAKE 1 TABLET BEFORE BREAKFAST       Atorvastatin requested too soon   Refill was approved and sent to pharmacy:     Last Time Medication was Filled:  Levothyroxine 07/10/2023 1 yr supply     Last Office Visit with Provider: 06/28/2024    Appt. scheduled on 07/25/2024

## 2024-07-15 DIAGNOSIS — G25.81 RLS (RESTLESS LEGS SYNDROME): ICD-10-CM

## 2024-07-15 DIAGNOSIS — E78.2 MIXED HYPERLIPIDEMIA: ICD-10-CM

## 2024-07-15 DIAGNOSIS — G89.29 CHRONIC KNEE PAIN AFTER TOTAL REPLACEMENT OF RIGHT KNEE JOINT: ICD-10-CM

## 2024-07-15 DIAGNOSIS — M25.562 CHRONIC PAIN OF LEFT KNEE: ICD-10-CM

## 2024-07-15 DIAGNOSIS — G89.29 CHRONIC PAIN OF LEFT KNEE: ICD-10-CM

## 2024-07-15 DIAGNOSIS — M25.561 CHRONIC KNEE PAIN AFTER TOTAL REPLACEMENT OF RIGHT KNEE JOINT: ICD-10-CM

## 2024-07-15 DIAGNOSIS — Z96.651 CHRONIC KNEE PAIN AFTER TOTAL REPLACEMENT OF RIGHT KNEE JOINT: ICD-10-CM

## 2024-07-15 RX ORDER — EZETIMIBE 10 MG/1
10 TABLET ORAL NIGHTLY
Qty: 90 TABLET | Refills: 0 | Status: SHIPPED | OUTPATIENT
Start: 2024-07-15

## 2024-07-15 RX ORDER — GABAPENTIN 300 MG/1
300 CAPSULE ORAL NIGHTLY
Qty: 90 CAPSULE | Refills: 0 | Status: SHIPPED | OUTPATIENT
Start: 2024-07-15

## 2024-07-15 NOTE — TELEPHONE ENCOUNTER
Refill Passed Protocol:     Pt requesting refill of   Requested Prescriptions     Pending Prescriptions Disp Refills    gabapentin 300 MG Oral Cap [Pharmacy Med Name: GABAPENTIN 300 MG Capsule] 90 capsule 0     Sig: TAKE 1 CAPSULE EVERY NIGHT    ezetimibe 10 MG Oral Tab [Pharmacy Med Name: EZETIMIBE 10 MG Tablet] 90 tablet 0     Sig: TAKE 1 TABLET EVERY NIGHT      Refill was approved and sent to pharmacy:     Last Time Medication was Filled:    Ezetimibe 5/23/2024  Gabapentin 4/15/77912    Last Office Visit with Provider: 6/28/2024    Appt. scheduled on 7/25/2024

## 2024-07-25 ENCOUNTER — TELEMEDICINE (OUTPATIENT)
Dept: FAMILY MEDICINE CLINIC | Facility: CLINIC | Age: 74
End: 2024-07-25
Payer: MEDICARE

## 2024-07-25 DIAGNOSIS — G47.33 OSA ON CPAP: ICD-10-CM

## 2024-07-25 DIAGNOSIS — R53.83 OTHER FATIGUE: ICD-10-CM

## 2024-07-25 DIAGNOSIS — G89.29 CHRONIC PAIN OF RIGHT KNEE: ICD-10-CM

## 2024-07-25 DIAGNOSIS — M25.561 CHRONIC PAIN OF RIGHT KNEE: ICD-10-CM

## 2024-07-25 DIAGNOSIS — F33.42 RECURRENT MAJOR DEPRESSIVE DISORDER, IN FULL REMISSION (HCC): ICD-10-CM

## 2024-07-25 DIAGNOSIS — F41.1 GENERALIZED ANXIETY DISORDER: ICD-10-CM

## 2024-07-25 DIAGNOSIS — E11.40 TYPE 2 DIABETES MELLITUS WITH DIABETIC NEUROPATHY, WITHOUT LONG-TERM CURRENT USE OF INSULIN (HCC): ICD-10-CM

## 2024-07-25 DIAGNOSIS — E78.2 MIXED HYPERLIPIDEMIA: ICD-10-CM

## 2024-07-25 DIAGNOSIS — Z96.651 STATUS POST TOTAL RIGHT KNEE REPLACEMENT: ICD-10-CM

## 2024-07-25 DIAGNOSIS — D50.9 MICROCYTIC ANEMIA: ICD-10-CM

## 2024-07-25 DIAGNOSIS — Z87.898 HISTORY OF DIZZINESS: ICD-10-CM

## 2024-07-25 DIAGNOSIS — G44.40 REBOUND HEADACHE: Primary | ICD-10-CM

## 2024-07-25 PROCEDURE — 1170F FXNL STATUS ASSESSED: CPT | Performed by: FAMILY MEDICINE

## 2024-07-25 PROCEDURE — 99215 OFFICE O/P EST HI 40 MIN: CPT | Performed by: FAMILY MEDICINE

## 2024-07-25 PROCEDURE — G2211 COMPLEX E/M VISIT ADD ON: HCPCS | Performed by: FAMILY MEDICINE

## 2024-07-25 PROCEDURE — 1160F RVW MEDS BY RX/DR IN RCRD: CPT | Performed by: FAMILY MEDICINE

## 2024-07-25 PROCEDURE — 1159F MED LIST DOCD IN RCRD: CPT | Performed by: FAMILY MEDICINE

## 2024-07-25 RX ORDER — METHYLPREDNISOLONE 4 MG/1
TABLET ORAL
Qty: 21 TABLET | Refills: 0 | Status: SHIPPED | OUTPATIENT
Start: 2024-07-25

## 2024-07-25 RX ORDER — ATORVASTATIN CALCIUM 40 MG/1
40 TABLET, FILM COATED ORAL NIGHTLY
Qty: 90 TABLET | Refills: 0 | Status: SHIPPED | OUTPATIENT
Start: 2024-07-25

## 2024-07-25 RX ORDER — BUPROPION HYDROCHLORIDE 300 MG/1
300 TABLET ORAL EVERY MORNING
Qty: 90 TABLET | Refills: 0 | Status: CANCELLED
Start: 2024-07-25

## 2024-07-25 RX ORDER — SERTRALINE HYDROCHLORIDE 100 MG/1
100 TABLET, FILM COATED ORAL NIGHTLY
Qty: 90 TABLET | Refills: 0 | Status: CANCELLED
Start: 2024-07-25

## 2024-07-25 NOTE — PROGRESS NOTES
Due to COVID-19 ACTION PLAN, the patient's office visit was converted to a video visit.  Time Spent: 31min + 3 minutes reviewing chart and 9 minutes writing note for total of 43 minutes    Chief Complaint   Patient presents with    Follow - Up     Dizziness 4 weeks     Sinus Problem     Sinus pressure     Medication Follow-Up     Subjective     HPI:   Leah Parsons is a 73 year old female with history of type 2 diabetes-GERD, chronic site use status with allergic rhinitis, depression, obstructive sleep apnea who presents for follow-up dizziness.  Patient states she has not had dizziness since her last appointment.  Last appointment metformin was decreased to 1 tablet daily.  CMP and TSH were all normal but CBC demonstrated mild microcytic anemia.  Normocytic ferritin is low.  Patient was scheduled with GI to have an upper endoscopy last week and needed to reschedule with Dr. Mayberry gastroenterology to 8/27/2024.  Denies melena or black stool.  Denies abdominal pain.  Normal colonoscopy 5/2023 with Dr. David vargas.    She is still suffering with fatigue which is unchanged.  Uses CPAP but can easily take naps during the day has appointment with pulmonologist Dr. Atwood on 8/6/2024.    Type 2 diabetes blood sugars fasting and 2-hour postprandial have been less than 110.  Patient is taking lower dose metformin and Mounjaro 5 mg.  [facility] has eye appointment scheduled in 2 weeks.    Headaches-Long history of migraines on preventative valproic acid has been managed by Dr. Hernandez neurologist and has upcoming annual appointment on 8/20/2024.  Patient is complaining today of a headache x 2 days pressures \"behind her eyes bilateral\".  She has photophobia.  Admits to taking Excedrin 3 times a day but stopped a month ago after received her lab work for dizziness demonstrating anemia.  States her daughter told her there is aspirin and caffeine is bad for her stomach.  Patient admits she has been taking Excedrin 3 times  a day for her knee pain for several years.  It also helps her headaches.  When she has stopped taking the Excedrin her headaches worsened.  If she takes an Excedrin then the pain does not completely resolve but will be very minimal.  She is denies thunderclap or worst headache of her life.  Pain is rated a 7 out of 10.  Denies any weakness or numbness in her arms or legs, vision changes, aura, scotoma, dizziness, speech deficit or difficulties or dysarthria.  Patient states these headaches are not new and has had for several decades.    History of depression and anxiety managed by psychiatrist has appointment tomorrow-had to cancel appointment last week due to ride issues with her daughter.  States has 2 pills left of her sertraline and bupropion and wants to know if I am able to refill it.  She denies feeling depressed, sad, hopeless, SI or HI.    Chronic right knee pain x 9 years since replacement.  Patient has had limited range of motion.  After significant weight loss with GLP-1 and physical therapy she has greatly improved range of motion in the right knee but still experiences pain.  She states she was evaluated by Dr. Abarca last year and was told there was \"nothing to be done\".  Unable to do surgical revision.  Patient states she uses a marijuana cream and saw pain management for her back and knee and had issues obtaining a marijuana license.  She has not had other consults of her knee.  She has been using the Excedrin with aspirin to help with her pain for several years.     Chief Complaint Reviewed and Verified  Nursing Notes Reviewed and   Verified  Tobacco Reviewed  Allergies Reviewed  Medications Reviewed    Medical History Reviewed  Surgical History Reviewed  OB Status Reviewed    Family History Reviewed  Social History Reviewed                Current Outpatient Medications on File Prior to Visit   Medication Sig Dispense Refill    GABAPENTIN 300 MG Oral Cap TAKE 1 CAPSULE EVERY NIGHT 90 capsule  0    ezetimibe 10 MG Oral Tab TAKE 1 TABLET EVERY NIGHT 90 tablet 0    levothyroxine 88 MCG Oral Tab TAKE 1 TABLET BEFORE BREAKFAST 90 tablet 0    metFORMIN 500 MG Oral Tab Take 2 tablets (1,000 mg total) by mouth daily with breakfast. 180 tablet 0    Tirzepatide (MOUNJARO) 5 MG/0.5ML Subcutaneous Solution Pen-injector Inject 5 mg into the skin once a week. 2 mL 4    fluticasone propionate 50 MCG/ACT Nasal Suspension 2 sprays by Nasal route daily. 48 g 3    montelukast 10 MG Oral Tab Take 1 tablet (10 mg total) by mouth daily. 90 tablet 2    omeprazole 20 MG Oral Capsule Delayed Release TAKE 1 CAPSULE EVERY MORNING ON AN EMPTY STOMACH 90 capsule 3    valsartan 80 MG Oral Tab Take 1 tablet (80 mg total) by mouth daily. 90 tablet 1    atorvastatin 40 MG Oral Tab Take 1 tablet (40 mg total) by mouth nightly. 90 tablet 0    clonazePAM 0.5 MG Oral Tab       divalproex  MG Oral Tablet 24 Hr Take 1 tablet (250 mg total) by mouth daily. 90 tablet 3    Glucose Blood (TRUE METRIX BLOOD GLUCOSE TEST) In Vitro Strip 1 strip by In Vitro route daily. 100 strip 3    TRUEPLUS LANCETS 33G Does not apply Misc TEST BLOOD SUGAR ONE TIME DAILY AS DIRECTED 100 each 3    Azelastine HCl 137 MCG/SPRAY Nasal Solution       sertraline 100 MG Oral Tab Take 1 tablet (100 mg total) by mouth nightly. 90 tablet 0    buPROPion  MG Oral Tablet 24 Hr Take 1 tablet (300 mg total) by mouth every morning. 90 tablet 0    CRANBERRY OR Take by mouth daily.      CALCIUM CITRATE-VITAMIN D3 OR Take by mouth As Directed.      Aspirin-Acetaminophen-Caffeine (EXCEDRIN EXTRA STRENGTH OR) Take 2 tablets by mouth every 6 (six) hours as needed.      Spacer/Aero-Holding Chambers ([facility]DA) Does not apply Misc Use as directed   0    Probiotic Product (PROBIOTIC OR) Take 1 capsule by mouth every morning. Indications: supplement      Cholecalciferol (VITAMIN D3) 1000 UNITS Oral Cap Take 2 capsules (2,000 Units total) by mouth daily.       No  current facility-administered medications on file prior to visit.           Physical Exam:  There were no vitals taken for this visit.    alert and cooperative, well-nourished/well-hydrated, no no pallor or tachypnea, appropriately groomed, speaking in full sentences comfortably and Normal work of breathing, answers questions appropriately      Office Visit on 06/28/2024   Component Date Value    WHITE BLOOD CELL COUNT 06/28/2024 9.0     RED BLOOD CELL COUNT 06/28/2024 5.07     HEMOGLOBIN 06/28/2024 12.3     HEMATOCRIT 06/28/2024 39.5     MCV 06/28/2024 77.9 (L)     MCH 06/28/2024 24.3 (L)     MCHC 06/28/2024 31.1 (L)     RDW 06/28/2024 15.8 (H)     PLATELET COUNT 06/28/2024 380     MPV 06/28/2024 10.3     ABSOLUTE NEUTROPHILS 06/28/2024 4,923     ABSOLUTE LYMPHOCYTES 06/28/2024 3,141     ABSOLUTE MONOCYTES 06/28/2024 738     ABSOLUTE EOSINOPHILS 06/28/2024 162     ABSOLUTE BASOPHILS 06/28/2024 36     NEUTROPHILS 06/28/2024 54.7     LYMPHOCYTES 06/28/2024 34.9     MONOCYTES 06/28/2024 8.2     EOSINOPHILS 06/28/2024 1.8     BASOPHILS 06/28/2024 0.4     GLUCOSE 06/28/2024 97     UREA NITROGEN (BUN) 06/28/2024 15     CREATININE 06/28/2024 0.77     EGFR 06/28/2024 81     BUN/CREATININE RATIO 06/28/2024 SEE NOTE:     SODIUM 06/28/2024 138     POTASSIUM 06/28/2024 5.0     CHLORIDE 06/28/2024 100     CARBON DIOXIDE 06/28/2024 25     CALCIUM 06/28/2024 10.2     PROTEIN, TOTAL 06/28/2024 7.4     ALBUMIN 06/28/2024 4.7     GLOBULIN 06/28/2024 2.7     ALBUMIN/GLOBULIN RATIO 06/28/2024 1.7     BILIRUBIN, TOTAL 06/28/2024 0.5     ALKALINE PHOSPHATASE 06/28/2024 81     AST 06/28/2024 16     ALT 06/28/2024 19     TSH W/REFLEX TO FT4 06/28/2024 1.00     FERRITIN 06/28/2024 11 (L)    Orders Only on 05/01/2024   Component Date Value    IRON, TOTAL 05/09/2024 47     IRON BINDING CAPACITY 05/09/2024 490 (H)     % SATURATION 05/09/2024 10 (L)     FERRITIN 05/09/2024 8 (L)    Office Visit on 04/15/2024   Component Date Value     CHOLESTEROL, TOTAL 04/27/2024 169     HDL CHOLESTEROL 04/27/2024 62     TRIGLYCERIDES 04/27/2024 146     LDL-CHOLESTEROL 04/27/2024 82     CHOL/HDLC RATIO 04/27/2024 2.7     NON-HDL CHOLESTEROL 04/27/2024 107     GLUCOSE 04/27/2024 115 (H)     UREA NITROGEN (BUN) 04/27/2024 18     CREATININE 04/27/2024 0.88     EGFR 04/27/2024 69     BUN/CREATININE RATIO 04/27/2024 SEE NOTE:     SODIUM 04/27/2024 140     POTASSIUM 04/27/2024 5.1     CHLORIDE 04/27/2024 102     CARBON DIOXIDE 04/27/2024 26     CALCIUM 04/27/2024 9.9     PROTEIN, TOTAL 04/27/2024 7.2     ALBUMIN 04/27/2024 4.6     GLOBULIN 04/27/2024 2.6     ALBUMIN/GLOBULIN RATIO 04/27/2024 1.8     BILIRUBIN, TOTAL 04/27/2024 0.5     ALKALINE PHOSPHATASE 04/27/2024 96     AST 04/27/2024 21     ALT 04/27/2024 24     HEMOGLOBIN A1c 04/27/2024 6.8 (H)     CREATININE, RANDOM URINE 04/27/2024 26     MICROALBUMIN 04/27/2024 0.5     MICROALBUMIN/CREATININE * 04/27/2024 19     WHITE BLOOD CELL COUNT 04/27/2024 6.6     RED BLOOD CELL COUNT 04/27/2024 4.99     HEMOGLOBIN 04/27/2024 11.6 (L)     HEMATOCRIT 04/27/2024 39.2     MCV 04/27/2024 78.6 (L)     MCH 04/27/2024 23.2 (L)     MCHC 04/27/2024 29.6 (L)     RDW 04/27/2024 15.2 (H)     PLATELET COUNT 04/27/2024 392     MPV 04/27/2024 9.7     ABSOLUTE NEUTROPHILS 04/27/2024 3,188     ABSOLUTE LYMPHOCYTES 04/27/2024 2,633     ABSOLUTE MONOCYTES 04/27/2024 449     ABSOLUTE EOSINOPHILS 04/27/2024 271     ABSOLUTE BASOPHILS 04/27/2024 59     NEUTROPHILS 04/27/2024 48.3     LYMPHOCYTES 04/27/2024 39.9     MONOCYTES 04/27/2024 6.8     EOSINOPHILS 04/27/2024 4.1     BASOPHILS 04/27/2024 0.9     TSH W/REFLEX TO FT4 04/27/2024 1.92              Assessment    Diagnoses and all orders for this visit:    Rebound headache  -     methylPREDNISolone 4 MG Oral Tab; Dose as generic Medrol Dosepak Take as directed with food and water at a meal  Has some features of typical migraine  Trial of prednisone to break headache cycle  Suspect may be  having some rebound headaches due to frequent analgesic use with Excedrin  Risk, benefits and side effects of methylprednisolone discussed including gastritis, gastric ulcer, GI bleeding, insomnia, irritability, insomnia risk of mood changes, psychosis which is rare.  If becomes severely depressed or has any moderate to severe symptoms should call office immediately.  Prescription to be taken with food and water.  Stop and call with side effects.  Avoid any other ibuprofen, Advil, naproxen, Naprosyn OTC while taking ibuprofen.  Use Tylenol   And discuss treatment medications in August with neurologist for aborting headaches CGRP inhibitor?    Chronic pain of right knee  Status post total right knee replacement  -     Pain Management Referral - In Network  -     Ortho Referral - In Network  Encouraged weight loss-previously pain significantly was reduced with moderate weight loss.  On Mounjaro 5 mg will titrate as needed to help with weight loss  Recommend consult with second orthopedist and pain management for treatment options  Discontinue Excedrin suspect may be causing headaches and possibly GI bleeding    Microcytic anemia  Admits today that for years using Excedrin 3 times daily and discontinuing it after diagnosed with anemia  Keep appointment with GI as scheduled 8/27/2024    History of dizziness  Due to anemia?  Due to lower blood sugars?  Improved or resolved on lower dose of metformin for the past 4 weeks  Etiology unclear will monitor    Other fatigue  YVONNE on CPAP  Keep upcoming appointment for CPAP check with pulmonology  Patient is gained significant weight and now working on weight loss may need titration study  Patient will discuss with Dr. Atwood on 8/6/2024    Recurrent major depressive disorder, in full remission (HCC)  Generalized anxiety disorder  Patient should request refills from psychiatry since managing has appointment tomorrow and has appropriate quantity until appointment  Patient  understands    Mixed hyperlipidemia  - atorvastatin 40 MG Oral Tab; Take 1 tablet (40 mg total) by mouth nightly.  Dispense: 90 tablet; Refill: 0  - Lipid Panel; Future  - Comp Metabolic Panel (14); Future  - Lipid Panel  - Comp Metabolic Panel (14)  Uncontrolled  Continue atorvastatin 40 mg  Last lipid panel 4/27/2024 normal-LDL 82  Due for repeat labs next month    Type 2 diabetes mellitus with diabetic neuropathy, without long-term current use of insulin (HCC)  - Lipid Panel; Future  - Comp Metabolic Panel (14); Future  - Hemoglobin A1C; Future  - Lipid Panel  - Comp Metabolic Panel (14)  - Hemoglobin A1C  Controlled by history  Last A1c 6.8 on 4/27/2024  Continue Mounjaro, metformin  Continue with weight loss  Recheck labs every 3 months-due next month       Lab work ordered.  Prescription medication ordered.  Reinforced healthy diet, lifestyle, and exercise.    Return in about 1 month (around 8/25/2024) for HTN,HL,DM, discuss labs, recheck symptoms, sooner if needed..    Marie Alvarado, DO Leah Parsons understands video or phone evaluation is not a substitute for face-to-face examination or emergency care. Patient advised to go to ER or call 911 for worsening symptoms or acute distress.     Patient/Caregiver Education: Patient/Caregiver Education: There are no barriers to learning. Medical education done.   Outcome: Patient verbalizes understanding. Patient is notified to call with any questions, complications, allergies, or worsening or changing symptoms.  Patient is to call with any side effects or complications from the treatments as a result of today.     Please note that the following visit was completed using two-way, real-time interactive audio and/or video communication.  This has been done in good breanna to provide continuity of care in the best interest of the provider-patient relationship, due to the on-going public health crisis/national emergency and because of restrictions of visitation.   There are limitations of this visit as no physical exam could be performed.  Every conscious effort was taken to allow for sufficient and adequate time.  This billing visit was spent on reviewing labs, medications, radiology tests and decision making.  Appropriate medical decision-making and tests are ordered as detailed in the plan of care above.

## 2024-08-01 ENCOUNTER — OFFICE VISIT (OUTPATIENT)
Dept: PODIATRY CLINIC | Facility: CLINIC | Age: 74
End: 2024-08-01
Payer: MEDICARE

## 2024-08-01 DIAGNOSIS — E11.9 DIABETES MELLITUS WITHOUT COMPLICATION (HCC): Primary | ICD-10-CM

## 2024-08-01 DIAGNOSIS — M20.42 HAMMER TOES OF BOTH FEET: ICD-10-CM

## 2024-08-01 DIAGNOSIS — L84 FOOT CALLUS: ICD-10-CM

## 2024-08-01 DIAGNOSIS — M20.41 HAMMER TOES OF BOTH FEET: ICD-10-CM

## 2024-08-01 PROCEDURE — 1159F MED LIST DOCD IN RCRD: CPT | Performed by: STUDENT IN AN ORGANIZED HEALTH CARE EDUCATION/TRAINING PROGRAM

## 2024-08-01 PROCEDURE — 1126F AMNT PAIN NOTED NONE PRSNT: CPT | Performed by: STUDENT IN AN ORGANIZED HEALTH CARE EDUCATION/TRAINING PROGRAM

## 2024-08-01 PROCEDURE — 1160F RVW MEDS BY RX/DR IN RCRD: CPT | Performed by: STUDENT IN AN ORGANIZED HEALTH CARE EDUCATION/TRAINING PROGRAM

## 2024-08-01 PROCEDURE — 99203 OFFICE O/P NEW LOW 30 MIN: CPT | Performed by: STUDENT IN AN ORGANIZED HEALTH CARE EDUCATION/TRAINING PROGRAM

## 2024-08-01 NOTE — PROGRESS NOTES
[facility]  Progress Note    Leah Parsons is a 73 year old female.   Chief Complaint   Patient presents with    Diabetic Foot Care     Bilateral feet- patient is pre-diabetic- fbg 80- A1c 6.8 04/28/24          HPI:     Patient is a pleasant 73-year-old diabetic female who presents to clinic for foot exam.  She typically trims her nails on her own but she does have difficulty with this due to knee surgery.  She denies any numbness or open sores to her feet.  Her blood sugars were 80 mg/dL and checked this morning.  Her last hemoglobin A1c was 6.8% on 4/28/2024.  Past medical history, medications, and allergies were reviewed.      Allergies: Sulfa antibiotics   Current Outpatient Medications   Medication Sig Dispense Refill    atorvastatin 40 MG Oral Tab Take 1 tablet (40 mg total) by mouth nightly. 90 tablet 0    methylPREDNISolone 4 MG Oral Tab Dose as generic Medrol Dosepak Take as directed with food and water at a meal 21 tablet 0    GABAPENTIN 300 MG Oral Cap TAKE 1 CAPSULE EVERY NIGHT 90 capsule 0    ezetimibe 10 MG Oral Tab TAKE 1 TABLET EVERY NIGHT 90 tablet 0    levothyroxine 88 MCG Oral Tab TAKE 1 TABLET BEFORE BREAKFAST 90 tablet 0    metFORMIN 500 MG Oral Tab Take 2 tablets (1,000 mg total) by mouth daily with breakfast. 180 tablet 0    Tirzepatide (MOUNJARO) 5 MG/0.5ML Subcutaneous Solution Pen-injector Inject 5 mg into the skin once a week. 2 mL 4    fluticasone propionate 50 MCG/ACT Nasal Suspension 2 sprays by Nasal route daily. 48 g 3    montelukast 10 MG Oral Tab Take 1 tablet (10 mg total) by mouth daily. 90 tablet 2    omeprazole 20 MG Oral Capsule Delayed Release TAKE 1 CAPSULE EVERY MORNING ON AN EMPTY STOMACH 90 capsule 3    valsartan 80 MG Oral Tab Take 1 tablet (80 mg total) by mouth daily. 90 tablet 1    clonazePAM 0.5 MG Oral Tab       divalproex  MG Oral Tablet 24 Hr Take 1 tablet (250 mg total) by mouth daily. 90 tablet 3    Glucose Blood (TRUE METRIX BLOOD  GLUCOSE TEST) In Vitro Strip 1 strip by In Vitro route daily. 100 strip 3    TRUEPLUS LANCETS 33G Does not apply Misc TEST BLOOD SUGAR ONE TIME DAILY AS DIRECTED 100 each 3    Azelastine HCl 137 MCG/SPRAY Nasal Solution       sertraline 100 MG Oral Tab Take 1 tablet (100 mg total) by mouth nightly. 90 tablet 0    buPROPion  MG Oral Tablet 24 Hr Take 1 tablet (300 mg total) by mouth every morning. 90 tablet 0    CRANBERRY OR Take by mouth daily.      CALCIUM CITRATE-VITAMIN D3 OR Take by mouth As Directed.      Aspirin-Acetaminophen-Caffeine (EXCEDRIN EXTRA STRENGTH OR) Take 2 tablets by mouth every 6 (six) hours as needed.      Spacer/Aero-Holding Chambers (OPTICHAMBER RHONDA) Does not apply Misc Use as directed   0    Probiotic Product (PROBIOTIC OR) Take 1 capsule by mouth every morning. Indications: supplement      Cholecalciferol (VITAMIN D3) 1000 UNITS Oral Cap Take 2 capsules (2,000 Units total) by mouth daily.        Past Medical History:    Acute deep vein thrombosis (DVT) of tibial vein of left lower extremity (HCC)    Acute deep vein thrombosis (DVT) of tibial vein of left lower extremity (HCC)    Off Xarelto since 11/2019-managed by Dr. Rosenbaum-states D-dimer was elevated due to persistent OA and risk of anticoagulant was greater then treating a very small posterior tibial clot.  denies any calf pain or leg swelling.    Acute recurrent ethmoidal sinusitis    Allergic rhinitis    Anxiety    Arthritis    Aspiration pneumonia of both lungs (HCC)    Hospitalized [facility] with Parainfluenza virus 11/29/2021    Bursitis, shoulder, left    Depression    Diabetes (HCC)    DVT (deep venous thrombosis) ([facility])    left leg posterior tibial    Epigastric pain    Hx of gastritis- last EGD 2014-Dr. Diane     Esophageal reflux    last EGD [id number]    Essential hypertension    Extrinsic asthma, unspecified    HEADACHES    uses depakote    History of colonoscopy with polypectomy    hemorrhoids- repeat  colonscopy 2017 negative- repeat in 5 years.    Hypothyroidism    acquired. no cancer or nodules    Impingement syndrome, shoulder, left    Leg length discrepancy    Migraines    Normal vaginal delivery (HCC)    ,    Obesity    Osteoarthritis    Other and unspecified hyperlipidemia    Parainfluenza virus laryngotracheobronchitis    Last Assessment & Plan:  Formatting of this note might be different from the original. Not requiring any oxygen at this time Continue cough meds Continue neb therapy    Prediabetes    Right hip impingement syndrome    Right sided sciatica    Shoulder pain, left    Sleep apnea    Thyroid condition    Unspecified sleep apnea    uses Cpap      Past Surgical History:   Procedure Laterality Date    Appendectomy  1964    Back surgery  2021    Injection for spinal stenosis & rods placed    Cholecystectomy      Colonoscopy      [facility]- diverticulosis , polyps. had EGD same time    Colonoscopy N/A 2017    Procedure: COLONOSCOPY;  Surgeon: Deep Diane MD;  Location:  ENDOSCOPY    Knee replacement surgery      to the right knee only    Knee surgery  3/8/2015     Knee surgery  2015     3 knee surgeries, replacement and arthoscopy       1977    Other surgical history  2012    right arm carpal tunnel      Family History   Problem Relation Age of Onset    Hypertension Father     Dementia Father     Hypertension Mother     Lipids Mother     Diabetes Mother     Diabetes Maternal Grandmother     Cancer Paternal Grandmother     Hypertension Paternal Grandmother     Obesity Paternal Grandmother     Heart Disorder Maternal Uncle       Social History     Socioeconomic History    Marital status:    Tobacco Use    Smoking status: Never    Smokeless tobacco: Never   Vaping Use    Vaping status: Never Used   Substance and Sexual Activity    Alcohol use: No    Drug use: No   Other Topics Concern    Caffeine Concern No    Exercise Yes    Seat Belt Yes     Special Diet No    Stress Concern Yes    Weight Concern Yes           REVIEW OF SYSTEMS:     Today reviewed systems as documented below  GENERAL HEALTH: feels well otherwise  SKIN: denies any unusual skin lesions or rashes  RESPIRATORY: denies shortness of breath with exertion  CARDIOVASCULAR: denies chest pain on exertion  GI: denies abdominal pain and denies heartburn  NEURO: denies headaches  MUSCULO: History of arthritis      EXAM:   There were no vitals taken for this visit.  GENERAL: well developed, well nourished, in no apparent distress  EXTREMITIES:   1. Integument: Normal skin temperature and turgor.  Hyperkeratotic lesion noted subfifth metatarsal head bilaterally.  2. Vascular: Dorsalis pedis two out of four bilateral and posterior tibial pulses two out of   four bilateral, capillary refill normal.   3. Musculoskeletal: All muscle groups are graded 5 out of 5 in the foot and ankle.  Flexion contracture of lesser digits.   4. Neurological: Normal sharp dull sensation; reflexes normal.        ASSESSMENT AND PLAN:   Diagnoses and all orders for this visit:    Diabetes mellitus without complication (HCC)    Hammer toes of both feet    Foot callus        Plan:    -Patient examined, chart history reviewed.  -Discussed importance of proper pedal hygiene, regular foot checks, and tight glucose control.  -Pared HPK's x 2 with #15 blade to healthy tissue without incident.  Can use urea cream to sites between visits.  -Ambulate with supportive shoes and inserts and avoid walking barefoot.  -Educated patient on acute signs of infection advised patient to seek immediate medical attention if symptoms arise.     The patient indicates understanding of these issues and agrees to the plan.    Follow-up every 2 months routine footcare/as needed.    Campos Teresa DPM    Dragon speech recognition software was used to prepare this note.  Errors in word recognition may occur.  Please contact me with any questions/concerns with  this note.

## 2024-08-09 ENCOUNTER — TELEPHONE (OUTPATIENT)
Dept: NEUROLOGY | Facility: CLINIC | Age: 74
End: 2024-08-09

## 2024-08-12 DIAGNOSIS — E11.40 TYPE 2 DIABETES MELLITUS WITH DIABETIC NEUROPATHY, WITHOUT LONG-TERM CURRENT USE OF INSULIN (HCC): ICD-10-CM

## 2024-08-12 RX ORDER — GLUCOSAM/CHON-MSM1/C/MANG/BOSW 500-416.6
TABLET ORAL DAILY
Qty: 100 EACH | Refills: 3 | Status: SHIPPED | OUTPATIENT
Start: 2024-08-12

## 2024-08-12 NOTE — TELEPHONE ENCOUNTER
Refill Passed Protocol:     Pt requesting refill of   Requested Prescriptions     Pending Prescriptions Disp Refills    TRUEPLUS LANCETS 33G Does not apply Misc [Pharmacy Med Name: TRUEPLUS LANCETS 33G] 100 each 3     Sig: TEST BLOOD SUGAR ONE TIME DAILY AS DIRECTED      Refill was approved and sent to pharmacy:   Type 2 diabetes mellitus with diabetic neuropathy, without long-term current use of insulin (HCC)  - Lipid Panel; Future  - Comp Metabolic Panel (14); Future  - Hemoglobin A1C; Future  - Lipid Panel  - Comp Metabolic Panel (14)  - Hemoglobin A1C  Controlled by history  Last A1c 6.8 on 4/27/2024  Continue Mounjaro, metformin  Continue with weight loss  Recheck labs every 3 months-due next month    Last Time Medication was Filled:  7/26/2023    Last Office Visit with Provider: Telemedicine 7/25/2024    No future appointments.   Return in about 1 month (around 8/25/2024) for HTN,HL,DM, discuss labs, recheck symptoms, sooner if needed..

## 2024-08-28 ENCOUNTER — HOSPITAL ENCOUNTER (OUTPATIENT)
Dept: MAMMOGRAPHY | Facility: HOSPITAL | Age: 74
Discharge: HOME | End: 2024-08-28
Attending: FAMILY MEDICINE
Payer: COMMERCIAL

## 2024-08-28 DIAGNOSIS — Z12.31 ENCOUNTER FOR SCREENING MAMMOGRAM FOR MALIGNANT NEOPLASM OF BREAST: ICD-10-CM

## 2024-08-28 PROCEDURE — 77067 SCR MAMMO BI INCL CAD: CPT | Performed by: FAMILY MEDICINE

## 2024-08-28 PROCEDURE — 77063 BREAST TOMOSYNTHESIS BI: CPT | Performed by: FAMILY MEDICINE

## 2024-09-13 DIAGNOSIS — I10 BENIGN ESSENTIAL HTN: ICD-10-CM

## 2024-09-13 RX ORDER — VALSARTAN 80 MG/1
80 TABLET ORAL DAILY
Qty: 90 TABLET | Refills: 0 | Status: SHIPPED | OUTPATIENT
Start: 2024-09-13

## 2024-09-13 NOTE — TELEPHONE ENCOUNTER
Refill Passed Protocol:     Pt requesting refill of   Requested Prescriptions     Pending Prescriptions Disp Refills    valsartan 80 MG Oral Tab [Pharmacy Med Name: Valsartan Oral Tablet 80 MG] 90 tablet 0     Sig: TAKE 1 TABLET EVERY DAY     Refill was approved and sent to pharmacy:     Hypertension Medications Protocol [id number] 03:28 AM   Protocol Details CMP or BMP in past 12 months    Last BP reading less than 140/90    In person appointment or virtual visit in the past 12 mos or appointment in next 3 mos    EGFRCR or GFRNAA > 50        Last Time Medication was Filled:  4/15/2024    Last Office Visit with Provider: 6/28/2024    No future appointments.

## 2024-09-19 DIAGNOSIS — E03.9 ACQUIRED HYPOTHYROIDISM: ICD-10-CM

## 2024-09-19 RX ORDER — LEVOTHYROXINE SODIUM 88 UG/1
TABLET ORAL
Qty: 90 TABLET | Refills: 0 | Status: SHIPPED | OUTPATIENT
Start: 2024-09-19

## 2024-09-19 NOTE — TELEPHONE ENCOUNTER
Pt requesting refill of   Requested Prescriptions     Pending Prescriptions Disp Refills    levothyroxine 88 MCG Oral Tab [Pharmacy Med Name: Levothyroxine Sodium Oral Tablet 88 MCG] 90 tablet 0     Sig: TAKE 1 TABLET BEFORE BREAKFAST      Refill was approved and sent to pharmacy:     Last Time Medication was Filled:  7/06/2024    Last Office Visit with Provider where medication was addressed: 4/15/2024  Acquired hypothyroidism  Overview:  2011- no cancer or known nodules.  Orders:  -     TSH W Reflex To Free T4  -     Detailed, Mod Complex (32613)  Continue levothyroxine  Appears euthymic  Due for annual labs    No future appointments.

## 2024-10-01 DIAGNOSIS — G89.29 CHRONIC KNEE PAIN AFTER TOTAL REPLACEMENT OF RIGHT KNEE JOINT: ICD-10-CM

## 2024-10-01 DIAGNOSIS — Z96.651 CHRONIC KNEE PAIN AFTER TOTAL REPLACEMENT OF RIGHT KNEE JOINT: ICD-10-CM

## 2024-10-01 DIAGNOSIS — M25.562 CHRONIC PAIN OF LEFT KNEE: ICD-10-CM

## 2024-10-01 DIAGNOSIS — G25.81 RLS (RESTLESS LEGS SYNDROME): ICD-10-CM

## 2024-10-01 DIAGNOSIS — E78.2 MIXED HYPERLIPIDEMIA: ICD-10-CM

## 2024-10-01 DIAGNOSIS — M25.561 CHRONIC KNEE PAIN AFTER TOTAL REPLACEMENT OF RIGHT KNEE JOINT: ICD-10-CM

## 2024-10-01 DIAGNOSIS — G89.29 CHRONIC PAIN OF LEFT KNEE: ICD-10-CM

## 2024-10-03 NOTE — TELEPHONE ENCOUNTER
Pt requesting refill of   Requested Prescriptions     Pending Prescriptions Disp Refills    gabapentin 300 MG Oral Cap [Pharmacy Med Name: Gabapentin Oral Capsule 300 MG] 90 capsule 0     Sig: TAKE 1 CAPSULE EVERY NIGHT    ezetimibe 10 MG Oral Tab [Pharmacy Med Name: Ezetimibe Oral Tablet 10 MG] 90 tablet 0     Sig: TAKE 1 TABLET EVERY NIGHT      Last Time Medication was Filled:    Ezetimibe 7/15/2024  Gabapentin 7/15/2024    Last Office Visit with Provider:   Telemedicine 7/25/2024    Return in about 1 month (around 8/25/2024) for HTN,HL,DM, discuss labs, recheck symptoms, sooner if needed..   No future appointments.     Patient is overdue for follow up appointment. Sent patient a [employer] message to call back office and schedule appointment with Marie Alvarado DO

## 2024-10-04 RX ORDER — GABAPENTIN 300 MG/1
300 CAPSULE ORAL NIGHTLY
Qty: 30 CAPSULE | Refills: 0 | Status: SHIPPED | OUTPATIENT
Start: 2024-10-04

## 2024-10-04 RX ORDER — EZETIMIBE 10 MG/1
10 TABLET ORAL NIGHTLY
Qty: 30 TABLET | Refills: 0 | Status: SHIPPED | OUTPATIENT
Start: 2024-10-04

## 2024-10-04 NOTE — TELEPHONE ENCOUNTER
Patient made her appointment for 10/29/24 and is going to get her blood work done can we send over her medication

## 2024-10-16 DIAGNOSIS — E11.40 TYPE 2 DIABETES MELLITUS WITH DIABETIC NEUROPATHY, WITHOUT LONG-TERM CURRENT USE OF INSULIN (HCC): ICD-10-CM

## 2024-10-17 NOTE — TELEPHONE ENCOUNTER
Pt requesting refill of   Requested Prescriptions     Pending Prescriptions Disp Refills    metFORMIN 500 MG Oral Tab [Pharmacy Med Name: metFORMIN HCl Oral Tablet 500 MG] 360 tablet 0     Sig: TAKE 4 TABLETS EVERY DAY WITH BREAKFAST      Last Time Medication was Filled:  6/28/2024    Last Office Visit with Provider: Telemedicine 7/25/2024    Appt. scheduled on 10/29/2024

## 2024-10-29 LAB
ALBUMIN/GLOBULIN RATIO: 1.7 (CALC) (ref 1–2.5)
ALBUMIN: 4.4 G/DL (ref 3.6–5.1)
ALKALINE PHOSPHATASE: 106 U/L (ref 37–153)
ALT: 28 U/L (ref 6–29)
AST: 21 U/L (ref 10–35)
BILIRUBIN, TOTAL: 0.6 MG/DL (ref 0.2–1.2)
BUN: 17 MG/DL (ref 7–25)
CALCIUM: 10.3 MG/DL (ref 8.6–10.4)
CARBON DIOXIDE: 24 MMOL/L (ref 20–32)
CHLORIDE: 104 MMOL/L (ref 98–110)
CHOL/HDLC RATIO: 2.4 (CALC)
CHOLESTEROL, TOTAL: 156 MG/DL
CREATININE: 0.88 MG/DL (ref 0.6–1)
EGFR: 69 ML/MIN/1.73M2
GLOBULIN: 2.6 G/DL (CALC) (ref 1.9–3.7)
GLUCOSE: 107 MG/DL (ref 65–99)
HDL CHOLESTEROL: 64 MG/DL
HEMOGLOBIN A1C: 6.6 % OF TOTAL HGB
LDL-CHOLESTEROL: 70 MG/DL (CALC)
NON-HDL CHOLESTEROL: 92 MG/DL (CALC)
POTASSIUM: 5.1 MMOL/L (ref 3.5–5.3)
PROTEIN, TOTAL: 7 G/DL (ref 6.1–8.1)
SODIUM: 141 MMOL/L (ref 135–146)
TRIGLYCERIDES: 135 MG/DL

## 2024-11-07 DIAGNOSIS — E11.40 TYPE 2 DIABETES MELLITUS WITH DIABETIC NEUROPATHY, WITHOUT LONG-TERM CURRENT USE OF INSULIN (HCC): ICD-10-CM

## 2024-11-08 ENCOUNTER — OFFICE VISIT (OUTPATIENT)
Dept: FAMILY MEDICINE CLINIC | Facility: CLINIC | Age: 74
End: 2024-11-08
Payer: MEDICARE

## 2024-11-08 VITALS
OXYGEN SATURATION: 97 % | WEIGHT: 244.81 LBS | HEART RATE: 94 BPM | BODY MASS INDEX: 41.79 KG/M2 | RESPIRATION RATE: 22 BRPM | SYSTOLIC BLOOD PRESSURE: 136 MMHG | DIASTOLIC BLOOD PRESSURE: 78 MMHG | TEMPERATURE: 98 F | HEIGHT: 64 IN

## 2024-11-08 DIAGNOSIS — D50.9 IRON DEFICIENCY ANEMIA, UNSPECIFIED IRON DEFICIENCY ANEMIA TYPE: ICD-10-CM

## 2024-11-08 DIAGNOSIS — J32.2 CHRONIC ETHMOIDAL SINUSITIS: ICD-10-CM

## 2024-11-08 DIAGNOSIS — E78.2 MIXED HYPERLIPIDEMIA: ICD-10-CM

## 2024-11-08 DIAGNOSIS — G47.33 OSA ON CPAP: ICD-10-CM

## 2024-11-08 DIAGNOSIS — I10 BENIGN ESSENTIAL HTN: ICD-10-CM

## 2024-11-08 DIAGNOSIS — E11.40 TYPE 2 DIABETES MELLITUS WITH DIABETIC NEUROPATHY, WITHOUT LONG-TERM CURRENT USE OF INSULIN (HCC): Primary | ICD-10-CM

## 2024-11-08 PROCEDURE — 1170F FXNL STATUS ASSESSED: CPT | Performed by: FAMILY MEDICINE

## 2024-11-08 PROCEDURE — 3044F HG A1C LEVEL LT 7.0%: CPT | Performed by: FAMILY MEDICINE

## 2024-11-08 PROCEDURE — 3008F BODY MASS INDEX DOCD: CPT | Performed by: FAMILY MEDICINE

## 2024-11-08 PROCEDURE — G2211 COMPLEX E/M VISIT ADD ON: HCPCS | Performed by: FAMILY MEDICINE

## 2024-11-08 PROCEDURE — 1160F RVW MEDS BY RX/DR IN RCRD: CPT | Performed by: FAMILY MEDICINE

## 2024-11-08 PROCEDURE — 99214 OFFICE O/P EST MOD 30 MIN: CPT | Performed by: FAMILY MEDICINE

## 2024-11-08 PROCEDURE — 3075F SYST BP GE 130 - 139MM HG: CPT | Performed by: FAMILY MEDICINE

## 2024-11-08 PROCEDURE — 1126F AMNT PAIN NOTED NONE PRSNT: CPT | Performed by: FAMILY MEDICINE

## 2024-11-08 PROCEDURE — 3078F DIAST BP <80 MM HG: CPT | Performed by: FAMILY MEDICINE

## 2024-11-08 PROCEDURE — 1159F MED LIST DOCD IN RCRD: CPT | Performed by: FAMILY MEDICINE

## 2024-11-08 RX ORDER — TIRZEPATIDE 7.5 MG/.5ML
7.5 INJECTION, SOLUTION SUBCUTANEOUS WEEKLY
Qty: 2 ML | Refills: 3 | Status: SHIPPED | OUTPATIENT
Start: 2024-11-08

## 2024-11-08 RX ORDER — ATORVASTATIN CALCIUM 40 MG/1
40 TABLET, FILM COATED ORAL NIGHTLY
Qty: 90 TABLET | Refills: 0 | OUTPATIENT
Start: 2024-11-08

## 2024-11-08 RX ORDER — EZETIMIBE 10 MG/1
10 TABLET ORAL NIGHTLY
Qty: 30 TABLET | Refills: 0 | OUTPATIENT
Start: 2024-11-08

## 2024-11-08 RX ORDER — VALSARTAN 80 MG/1
80 TABLET ORAL DAILY
Qty: 90 TABLET | Refills: 0 | OUTPATIENT
Start: 2024-11-08

## 2024-11-08 RX ORDER — EZETIMIBE 10 MG/1
10 TABLET ORAL NIGHTLY
Qty: 30 TABLET | Refills: 0 | Status: SHIPPED | OUTPATIENT
Start: 2024-11-08

## 2024-11-08 RX ORDER — TIRZEPATIDE 7.5 MG/.5ML
7.5 INJECTION, SOLUTION SUBCUTANEOUS WEEKLY
Qty: 2 ML | Refills: 3 | OUTPATIENT
Start: 2024-11-08

## 2024-11-08 RX ORDER — VALSARTAN 80 MG/1
80 TABLET ORAL DAILY
Qty: 90 TABLET | Refills: 0 | Status: SHIPPED | OUTPATIENT
Start: 2024-11-08

## 2024-11-08 RX ORDER — ATORVASTATIN CALCIUM 40 MG/1
40 TABLET, FILM COATED ORAL NIGHTLY
Qty: 90 TABLET | Refills: 0 | Status: SHIPPED | OUTPATIENT
Start: 2024-11-08

## 2024-11-08 NOTE — PROGRESS NOTES
CHIEF COMPLAINT:   Chief Complaint   Patient presents with    Follow - Up     3 months DM, discuss labs results        HPI:     Leah Parsons is a 73 year old female presents for recheck blood pressure, review labs and diabetes.    Type 2 diabetes- fasting blood sugars have remained . 2hr post prandial- .  On mounjaro 5 mg weekly and  feeling hungry- in donut whole with medicare and taking every other week medication due to cost.   Dizziness resolved with decreased metformin  Diet-low sugar, low carb  Exercise-no, needs to start walking  HEMOGLOBIN A1C (%)   Date Value   11/18/2021 5.9 (A)   12/12/2006 6.4 (H)     A1C (%)   Date Value   05/24/2006 6.0 (H)     HEMOGLOBIN A1c (% of total Hgb)   Date Value   10/28/2024 6.6 (H)   04/27/2024 6.8 (H)   06/15/2023 5.5   Last eye exam-8/2/2024  Dr. Will in [place]- no DBR.  Denies any vision changes.   Last microalbumin-4/27/2024 normal  Checking feet regulary.  Denies any numbness or tingling in her feet.    Denies any neuropathy or signs of depression.  Feels mentally she is doing well.     Pt presents for recheck of hyperlipidemia. Patient reports taking medications as instructed, no medication side effects noted. Denies any generalized muscle aches.    Patient presents for recheck of hypertension. Pt has been taking medications as instructed, no medication side effects, home no home BP monitoring. Denies headache, dizziness, chest pain, sob, leg swelling.  prior visit blood pressure was changed to valsartan due to patient's perception of increased cough.     Pt had a history of hypothyroidism and here to recheck. Has been tolerating the medication well. Last TSH   TSH   Date Value   04/18/2023 0.64 mIU/L   05/19/2022 1.020 mIU/mL   07/07/2021 1.280 mIU/mL   03/18/2021 4.730 mIU/mL (H)    needs repeat. Denies any shakiness, palpitations, increased BM's or wgt loss. Denies any fatigue, wgt gain.    Anemia- not eating meat as often due to cost. Consumes  red meat 1-2 a week, chicken 1 a week,  peanut butter and beans.  Had upper EGD- Dr Mayberry on 8/27/2024 revealed mild gastritis  and biopsies stomach and duodenum negative and needs to follow up with GI. Underwent colonoscopy with Dr. Mayberry 5/2023 which was unremarkable other than internal hemorrhoids. No further surveillance colonoscopies recommended.      Patient does have chronic sinusitis.  Working with allergist who initially tested her and stated that she was negative for allergies and the control was positive.  She is requesting to see ENT.  Cough has gotten better per patient.  Denies any side effects with valsartan.  Has not been monitoring blood pressure at home.  Denies headache, dizziness, chest pain, leg swelling, dyspnea on exertion    Using cpap daily, denies fatigue or hypersomnia, feels rested    Wt Readings from Last 6 Encounters:   11/08/24 244 lb 12.8 oz (111 kg)   06/28/24 236 lb 12.8 oz (107.4 kg)   05/23/24 234 lb 6.4 oz (106.3 kg)   04/15/24 255 lb 12.8 oz (116 kg)   08/22/23 223 lb (101.2 kg)   07/19/23 218 lb 6.4 oz (99.1 kg)       HISTORY:  Past Medical History:    Acute deep vein thrombosis (DVT) of tibial vein of left lower extremity (HCC)    Acute deep vein thrombosis (DVT) of tibial vein of left lower extremity (HCC)    Off Xarelto since 11/2019-managed by Dr. Rosenbaum-states D-dimer was elevated due to persistent OA and risk of anticoagulant was greater then treating a very small posterior tibial clot.  denies any calf pain or leg swelling.    Acute recurrent ethmoidal sinusitis    Allergic rhinitis    Anxiety    Arthritis    Aspiration pneumonia of both lungs (HCC)    Hospitalized [facility] with Parainfluenza virus 11/29/2021    Bursitis, shoulder, left    Depression    Diabetes (HCC)    DVT (deep venous thrombosis) (HCC)    left leg posterior tibial    Epigastric pain    Hx of gastritis- last EGD 2014-Dr. Diane     Esophageal reflux    last EGD [id number]    Essential  hypertension    Extrinsic asthma, unspecified    HEADACHES    uses depakote    History of colonoscopy with polypectomy    hemorrhoids- repeat colonscopy 2017 negative- repeat in 5 years.    Hypothyroidism    acquired. no cancer or nodules    Impingement syndrome, shoulder, left    Leg length discrepancy    Migraines    Normal vaginal delivery (HCC)    ,    Obesity    Osteoarthritis    Other and unspecified hyperlipidemia    Parainfluenza virus laryngotracheobronchitis    Last Assessment & Plan:  Formatting of this note might be different from the original. Not requiring any oxygen at this time Continue cough meds Continue neb therapy    Prediabetes    Right hip impingement syndrome    Right sided sciatica    Shoulder pain, left    Sleep apnea    Thyroid condition    Unspecified sleep apnea    uses Cpap      Past Surgical History:   Procedure Laterality Date    Appendectomy  1964    Back surgery  2021    Injection for spinal stenosis & rods placed    Cholecystectomy      Colonoscopy      [facility]- diverticulosis , polyps. had EGD same time    Colonoscopy N/A 2017    Procedure: COLONOSCOPY;  Surgeon: Deep Diane MD;  Location:  ENDOSCOPY    Knee replacement surgery      to the right knee only    Knee surgery  3/8/2015     Knee surgery  2015     3 knee surgeries, replacement and arthoscopy       1977    Other surgical history  2012    right arm carpal tunnel      Family History   Problem Relation Age of Onset    Hypertension Father     Dementia Father     Hypertension Mother     Lipids Mother     Diabetes Mother     Diabetes Maternal Grandmother     Cancer Paternal Grandmother     Hypertension Paternal Grandmother     Obesity Paternal Grandmother     Heart Disorder Maternal Uncle       Social History:   Social History     Socioeconomic History    Marital status:    Tobacco Use    Smoking status: Never    Smokeless tobacco: Never   Vaping Use    Vaping status:  Never Used   Substance and Sexual Activity    Alcohol use: No    Drug use: No   Other Topics Concern    Caffeine Concern No    Exercise Yes    Seat Belt Yes    Special Diet No    Stress Concern Yes    Weight Concern Yes     Social Drivers of Health     Financial Resource Strain: Not At Risk (11/17/2021)    [facility]    Financial Resource Strain     How hard is it for you to pay for the very basics like food, housing, medical care, and heating?: Not hard at all   Food Insecurity: No Food Insecurity (11/17/2021)    [facility]    Food Insecurity     Currently or in the past 3 months, have you worried your food would run out before you had money to buy more?: No     In the past 12 months, have you run out of food or been unable to get more?: No   Transportation Needs: No Transportation Needs (11/17/2021)    [facility]    Transportation Needs     Medical Transportation Needs?: Patient refused     Daily Living Transportation Needs? [Peds Only] : Patient refused    [facility]    Social Connections    [facility]    Housing Stability        Medications (Active prior to today's visit):  Current Outpatient Medications   Medication Sig Dispense Refill    metFORMIN 500 MG Oral Tab TAKE 4 TABLETS EVERY DAY WITH BREAKFAST 118 tablet 0    GABAPENTIN 300 MG Oral Cap TAKE 1 CAPSULE EVERY NIGHT 30 capsule 0    EZETIMIBE 10 MG Oral Tab TAKE 1 TABLET EVERY NIGHT 30 tablet 0    levothyroxine 88 MCG Oral Tab TAKE 1 TABLET BEFORE BREAKFAST 90 tablet 0    valsartan 80 MG Oral Tab TAKE 1 TABLET EVERY DAY 90 tablet 0    TRUEPLUS LANCETS 33G Does not apply Misc TEST BLOOD SUGAR ONE TIME DAILY AS DIRECTED 100 each 3     atorvastatin 40 MG Oral Tab Take 1 tablet (40 mg total) by mouth nightly. 90 tablet 0    Tirzepatide (MOUNJARO) 5 MG/0.5ML Subcutaneous Solution Pen-injector Inject 5 mg into the skin once a week. 2 mL 4    fluticasone propionate 50 MCG/ACT Nasal Suspension 2 sprays by Nasal route daily. 48 g 3    montelukast 10 MG Oral Tab Take 1 tablet (10 mg total) by mouth daily. 90 tablet 2    omeprazole 20 MG Oral Capsule Delayed Release TAKE 1 CAPSULE EVERY MORNING ON AN EMPTY STOMACH 90 capsule 3    clonazePAM 0.5 MG Oral Tab       divalproex  MG Oral Tablet 24 Hr Take 1 tablet (250 mg total) by mouth daily. 90 tablet 3    Glucose Blood (TRUE METRIX BLOOD GLUCOSE TEST) In Vitro Strip 1 strip by In Vitro route daily. 100 strip 3    Azelastine HCl 137 MCG/SPRAY Nasal Solution       sertraline 100 MG Oral Tab Take 1 tablet (100 mg total) by mouth nightly. 90 tablet 0    buPROPion  MG Oral Tablet 24 Hr Take 1 tablet (300 mg total) by mouth every morning. 90 tablet 0    CRANBERRY OR Take by mouth daily.      CALCIUM CITRATE-VITAMIN D3 OR Take by mouth As Directed.      Aspirin-Acetaminophen-Caffeine (EXCEDRIN EXTRA STRENGTH OR) Take 2 tablets by mouth every 6 (six) hours as needed.      Spacer/Aero-Holding Chambers (OPTICHAMBER RHONDA) Does not apply Misc Use as directed   0    Probiotic Product (PROBIOTIC OR) Take 1 capsule by mouth every morning. Indications: supplement      Cholecalciferol (VITAMIN D3) 1000 UNITS Oral Cap Take 2 capsules (2,000 Units total) by mouth daily.         Allergies:  Allergies   Allergen Reactions    Sulfa Antibiotics UNKNOWN     Carried over from childhood       PSFH elements reviewed from today and agreed except as otherwise stated in HPI.  PHYSICAL EXAM:   /78 (BP Location: Left arm, Patient Position: Sitting, Cuff Size: large)   Pulse 94   Temp 97.5 °F (36.4 °C) (Temporal)   Resp 22   Ht 5' 4\" (1.626 m)   Wt 244 lb 12.8 oz (111 kg)   SpO2 97%   BMI 42.02 kg/m²   Vital  signs reviewed.Appears stated age, well groomed.  Physical Exam   GENERAL: well developed, well nourished,in no apparent distress  EYES; PERRLA, EOM-i B/L. Sclera clear and non icteric bilateral  SKIN: no rashes,no suspicious lesions  HEENT: atraumatic, normocephalic  NECK: supple,no adenopathy,no bruits, no JVD  LUNGS: clear to auscultation bilateral. No RRW. Good inspiratory and expiratory effort  CARDIO: RRR without murmur, clear S1, S2  VS: no carotid artery bruit bilateral.    GI: good BS's,no masses,No HSM or tenderness.   EXTREMITIES: no cyanosis, clubbing or edema, bilateral. No calf tenderness  Bilateral barefoot skin diabetic exam is normal, visualized feet and the appearance is normal.  Bilateral monofilament/sensation of both feet is normal.  Pulsation pedal pulse exam of both lower legs/feet is normal as well.    LABS     No visits with results within 1 Month(s) from this visit.   Latest known visit with results is:   Telemedicine on 07/25/2024   Component Date Value    CHOLESTEROL, TOTAL 10/28/2024 156     HDL CHOLESTEROL 10/28/2024 64     TRIGLYCERIDES 10/28/2024 135     LDL-CHOLESTEROL 10/28/2024 70     CHOL/HDLC RATIO 10/28/2024 2.4     NON-HDL CHOLESTEROL 10/28/2024 92     GLUCOSE 10/28/2024 107 (H)     UREA NITROGEN (BUN) 10/28/2024 17     CREATININE 10/28/2024 0.88     EGFR 10/28/2024 69     BUN/CREATININE RATIO 10/28/2024 SEE NOTE:     SODIUM 10/28/2024 141     POTASSIUM 10/28/2024 5.1     CHLORIDE 10/28/2024 104     CARBON DIOXIDE 10/28/2024 24     CALCIUM 10/28/2024 10.3     PROTEIN, TOTAL 10/28/2024 7.0     ALBUMIN 10/28/2024 4.4     GLOBULIN 10/28/2024 2.6     ALBUMIN/GLOBULIN RATIO 10/28/2024 1.7     BILIRUBIN, TOTAL 10/28/2024 0.6     ALKALINE PHOSPHATASE 10/28/2024 106     AST 10/28/2024 21     ALT 10/28/2024 28     HEMOGLOBIN A1c 10/28/2024 6.6 (H)                  REVIEWED THIS VISIT  ASSESSMENT/PLAN:   73 year old female with    1. Type 2 diabetes mellitus with diabetic neuropathy,  without long-term current use of insulin (HCC)  - Lipid Panel; Future  - Hemoglobin A1C; Future  - Comp Metabolic Panel (14); Future  - MICROALB/CREAT RATIO, RANDOM URINE [6517] [Q]; Future  - Tirzepatide (MOUNJARO) 7.5 MG/0.5ML Subcutaneous Solution Auto-injector; Inject 7.5 mg into the skin once a week.  Dispense: 2 mL; Refill: 3  - Lipid Panel  - Hemoglobin A1C  - Comp Metabolic Panel (14)  - MICROALB/CREAT RATIO, RANDOM URINE [6517] [Q]  - metFORMIN 500 MG Oral Tab; Take 2 tablets (1,000 mg total) by mouth 2 (two) times daily with meals.  Dispense: 180 tablet; Refill: 1  Controlled  Issues with cost and mounjaro  Increase mounjaro 7.5mg for weight loss and metformin  Diabetes is controlled but still needs to work on weight loss which is part of diabetic plan and medication helps with his goal  Start walking 30 minutes daily.  DM eye exam annually due 8/2025  Continue low-carb diet  Labs are due in 3 months     2. Benign essential HTN  - Comp Metabolic Panel (14); Future  - Comp Metabolic Panel (14)  - valsartan 80 MG Oral Tab; Take 1 tablet (80 mg total) by mouth daily.  Dispense: 90 tablet; Refill: 0  Controlled  Encouraged 150-300 mins aerobic exercise weekly  <2g Na daily and Mediterranean diet  Weight loss if overweight  Home b/p monitoring 5x weekly goal <130/80, bring readings and cuff to each visit.  Continue current medication  Labs q 3 months    3. Mixed hyperlipidemia  - Lipid Panel; Future  - Comp Metabolic Panel (14); Future  - Lipid Panel  - Comp Metabolic Panel (14)  - atorvastatin 40 MG Oral Tab; Take 1 tablet (40 mg total) by mouth nightly.  Dispense: 90 tablet; Refill: 0  - ezetimibe 10 MG Oral Tab; Take 1 tablet (10 mg total) by mouth nightly.  Dispense: 30 tablet; Refill: 0  LDL at goal  Continue current medication  Repeat labs and OV in 3 months    4. Iron deficiency anemia, unspecified iron deficiency anemia type  - Gastro Referral - In Network  Completed EGD  Negative colonoscopy  No active  bleeding  Low meat intake  Needs to finish GI work up- schedule follow up with Dr. Mayberry    5. YVONEN on CPAP  Continue cpap  Compliant  Work on weight loss    6. Chronic ethmoidal sinusitis  Schedule follow up with ENT    The patient and provider have a longitudinal relationship to address/treat the serious or complex condition as stated in this encounter.      Meds This Visit:  Requested Prescriptions     Signed Prescriptions Disp Refills    Tirzepatide (MOUNJARO) 7.5 MG/0.5ML Subcutaneous Solution Auto-injector 2 mL 3     Sig: Inject 7.5 mg into the skin once a week.    atorvastatin 40 MG Oral Tab 90 tablet 0     Sig: Take 1 tablet (40 mg total) by mouth nightly.    ezetimibe 10 MG Oral Tab 30 tablet 0     Sig: Take 1 tablet (10 mg total) by mouth nightly.    valsartan 80 MG Oral Tab 90 tablet 0     Sig: Take 1 tablet (80 mg total) by mouth daily.    metFORMIN 500 MG Oral Tab 180 tablet 1     Sig: Take 2 tablets (1,000 mg total) by mouth 2 (two) times daily with meals.       Health Maintenance:  Colorectal Cancer Screening due on 04/07/2022  COVID-19 Vaccine(5 - Booster for Pfizer series) due on 10/02/2022  Diabetes Care Foot Exam (Annual) due on 01/01/2023  Diabetes Care Dilated Eye Exam due on 01/04/2023  Mammogram due on 07/13/2023  Influenza Vaccine(Season Ended) due on 10/01/2023  Diabetes Care A1C due on 10/18/2023  Diabetes Care: GFR due on 04/18/2024  Diabetes Care: Microalb/Creat Ratio due on 04/18/2024  DEXA Scan Completed  MA Annual Health Assessment Completed  Annual Depression Screening Completed  Fall Risk Screening (Annual) Completed  Pneumococcal Vaccine: 65+ Years Completed  Zoster Vaccines Completed      Patient/Caregiver Education: Patient/Caregiver Education: There are no barriers to learning. Medical education done.   Outcome: Patient verbalizes understanding. Patient is notified to call with any questions, comp lications, allergies, or worsening or changing symptoms.  Patient is to call with  any side effects or complications from the treatments as a result of today.     Problem List:     Patient Active Problem List   Diagnosis    History of gastritis    Left carpal tunnel syndrome    YVONNE on CPAP    Irritable colon    Diverticulosis of large intestine without hemorrhage    Acquired hypothyroidism    Benign essential HTN    Major depression in full remission (HCC)    Chronic migraine without aura without status migrainosus, not intractable    History of colonoscopy with polypectomy    DNR (do not resuscitate)    Nonallergic rhinitis    BMI 35.0-35.9,adult    History of DVT of lower extremity    Osteoarthritis of left knee    Chondromalacia of right hip    Generalized anxiety disorder    Spinal stenosis of lumbar region with neurogenic claudication    Chronic knee pain after total replacement of right knee joint    Lumbar radiculitis    Gastro-esophageal reflux disease without esophagitis    Mixed hyperlipidemia    Type 2 diabetes mellitus with diabetic neuropathy, unspecified (HCC)    Unspecified hearing loss, bilateral    History of aspiration pneumonia    RLS (restless legs syndrome)    Chronic ethmoidal sinusitis    Severe obesity (BMI 35.0-39.9) with comorbidity (HCC)    Microcytic anemia    Dizziness    Other fatigue       Imaging & Referrals:  None     5/26/2023  Marie Alvarado, DO      Patient understands plan and follow-up.  Return in about 3 months (around 2/8/2025) for HTN,HL,DM, discuss labs, sooner if needed..

## 2024-11-12 ENCOUNTER — TELEPHONE (OUTPATIENT)
Dept: FAMILY MEDICINE CLINIC | Facility: CLINIC | Age: 74
End: 2024-11-12

## 2024-11-12 DIAGNOSIS — E11.40 TYPE 2 DIABETES MELLITUS WITH DIABETIC NEUROPATHY, WITHOUT LONG-TERM CURRENT USE OF INSULIN (HCC): ICD-10-CM

## 2024-11-12 DIAGNOSIS — I10 BENIGN ESSENTIAL HTN: ICD-10-CM

## 2024-11-12 DIAGNOSIS — E78.2 MIXED HYPERLIPIDEMIA: ICD-10-CM

## 2024-11-12 RX ORDER — TIRZEPATIDE 7.5 MG/.5ML
7.5 INJECTION, SOLUTION SUBCUTANEOUS WEEKLY
Qty: 2 ML | Refills: 3 | Status: SHIPPED | OUTPATIENT
Start: 2024-11-12

## 2024-11-12 RX ORDER — EZETIMIBE 10 MG/1
10 TABLET ORAL NIGHTLY
Qty: 30 TABLET | Refills: 0 | Status: SHIPPED | OUTPATIENT
Start: 2024-11-12

## 2024-11-12 RX ORDER — ATORVASTATIN CALCIUM 40 MG/1
40 TABLET, FILM COATED ORAL NIGHTLY
Qty: 90 TABLET | Refills: 0 | Status: SHIPPED | OUTPATIENT
Start: 2024-11-12

## 2024-11-12 RX ORDER — VALSARTAN 80 MG/1
80 TABLET ORAL DAILY
Qty: 90 TABLET | Refills: 0 | Status: SHIPPED | OUTPATIENT
Start: 2024-11-12

## 2024-11-12 NOTE — TELEPHONE ENCOUNTER
Patient calling says her medication was sent to the wrong pharmacy needs them to go to [facility] pharmacy    Mounjaro  Ezetimibe  Valsartan  Atorvastatin     Please send to [facility] and cancel the ones that were sent to [facility].

## 2024-11-25 DIAGNOSIS — I10 BENIGN ESSENTIAL HTN: ICD-10-CM

## 2024-11-25 DIAGNOSIS — J31.0 NONALLERGIC RHINITIS: ICD-10-CM

## 2024-11-27 DIAGNOSIS — M25.562 CHRONIC PAIN OF LEFT KNEE: ICD-10-CM

## 2024-11-27 DIAGNOSIS — G89.29 CHRONIC KNEE PAIN AFTER TOTAL REPLACEMENT OF RIGHT KNEE JOINT: ICD-10-CM

## 2024-11-27 DIAGNOSIS — G89.29 CHRONIC PAIN OF LEFT KNEE: ICD-10-CM

## 2024-11-27 DIAGNOSIS — Z96.651 CHRONIC KNEE PAIN AFTER TOTAL REPLACEMENT OF RIGHT KNEE JOINT: ICD-10-CM

## 2024-11-27 DIAGNOSIS — G25.81 RLS (RESTLESS LEGS SYNDROME): ICD-10-CM

## 2024-11-27 DIAGNOSIS — M25.561 CHRONIC KNEE PAIN AFTER TOTAL REPLACEMENT OF RIGHT KNEE JOINT: ICD-10-CM

## 2024-11-27 RX ORDER — VALSARTAN 80 MG/1
80 TABLET ORAL DAILY
Qty: 90 TABLET | Refills: 0 | Status: SHIPPED | OUTPATIENT
Start: 2024-11-27

## 2024-11-27 NOTE — TELEPHONE ENCOUNTER
LMOM to return call to the office. Provided pt office phone [phone] along with office hours.    Tried calling patient to verify is she needs Montelukast refill.   Chronic ethmoidal sinusitis  -     Detailed, Mod Complex (52220)  -     ENT Referral - In Network  -     Fluticasone Propionate; 2 sprays by Nasal route daily.  Dispense: 48 g; Refill: 3  -     Montelukast Sodium; Take 1 tablet (10 mg total) by mouth daily.  Dispense: 90 tablet; Refill: 2  Has chronic sinus and nonallergic rhinitis  Requesting consult with ENT contemplating surgery  Singulair helps with congestion wants to continue  Also using fluticasone

## 2024-11-27 NOTE — TELEPHONE ENCOUNTER
Refill no protocol available:     Pt requesting refill of   Requested Prescriptions     Pending Prescriptions Disp Refills    valsartan 80 MG Oral Tab [Pharmacy Med Name: Valsartan Oral Tablet 80 MG] 90 tablet 0     Sig: TAKE 1 TABLET EVERY DAY    montelukast 10 MG Oral Tab [Pharmacy Med Name: Montelukast Sodium Oral Tablet 10 MG] 90 tablet 0     Sig: TAKE 1 TABLET EVERY DAY     Last Time Medication was Filled:    Montelukast 4/15/2024 90 days 2 refills  Valsartan 11/12/24 90 days 0 refills    Last Office Visit with Provider: 11/08/2024  Benign essential HTN  - Comp Metabolic Panel (14); Future  - Comp Metabolic Panel (14)  - valsartan 80 MG Oral Tab; Take 1 tablet (80 mg total) by mouth daily.  Dispense: 90 tablet; Refill: 0  Controlled  Encouraged 150-300 mins aerobic exercise weekly  <2g Na daily and Mediterranean diet  Weight loss if overweight  Home b/p monitoring 5x weekly goal <130/80, bring readings and cuff to each visit.  Continue current medication  Labs q 3 months    Chronic ethmoidal sinusitis  -     Detailed, Mod Complex (76356)  -     ENT Referral - In Network  -     Fluticasone Propionate; 2 sprays by Nasal route daily.  Dispense: 48 g; Refill: 3  -     Montelukast Sodium; Take 1 tablet (10 mg total) by mouth daily.  Dispense: 90 tablet; Refill: 2  Has chronic sinus and nonallergic rhinitis  Requesting consult with ENT contemplating surgery  Singulair helps with congestion wants to continue  Also using fluticasone    No future appointments.

## 2024-11-27 NOTE — TELEPHONE ENCOUNTER
Pt called office stating that she does need Montelukast medication refilled as well as Gabapentin.

## 2024-11-29 NOTE — TELEPHONE ENCOUNTER
Pt requesting refill of   Requested Prescriptions     Pending Prescriptions Disp Refills    gabapentin 300 MG Oral Cap [Pharmacy Med Name: Gabapentin Oral Capsule 300 MG] 30 capsule 0     Sig: TAKE 1 CAPSULE EVERY NIGHT      Last Time Medication was Filled:    Gabapentin 10/4/2024    Last Office Visit with Provider: 11/08/2024    No future appointments.

## 2024-11-30 RX ORDER — GABAPENTIN 300 MG/1
300 CAPSULE ORAL NIGHTLY
Qty: 90 CAPSULE | Refills: 1 | Status: SHIPPED | OUTPATIENT
Start: 2024-11-30

## 2024-12-01 DIAGNOSIS — E03.9 ACQUIRED HYPOTHYROIDISM: ICD-10-CM

## 2024-12-01 DIAGNOSIS — G43.709 CHRONIC MIGRAINE WITHOUT AURA WITHOUT STATUS MIGRAINOSUS, NOT INTRACTABLE: ICD-10-CM

## 2024-12-02 RX ORDER — MONTELUKAST SODIUM 10 MG/1
10 TABLET ORAL DAILY
Qty: 90 TABLET | Refills: 0 | Status: SHIPPED | OUTPATIENT
Start: 2024-12-02

## 2024-12-02 RX ORDER — DIVALPROEX SODIUM 250 MG/1
250 TABLET, FILM COATED, EXTENDED RELEASE ORAL DAILY
Qty: 90 TABLET | Refills: 3 | Status: SHIPPED | OUTPATIENT
Start: 2024-12-02

## 2024-12-02 RX ORDER — LEVOTHYROXINE SODIUM 88 UG/1
TABLET ORAL
Qty: 90 TABLET | Refills: 0 | Status: SHIPPED | OUTPATIENT
Start: 2024-12-02

## 2024-12-02 NOTE — TELEPHONE ENCOUNTER
Pt requesting refill of   Requested Prescriptions     Pending Prescriptions Disp Refills    levothyroxine 88 MCG Oral Tab [Pharmacy Med Name: Levothyroxine Sodium Oral Tablet 88 MCG] 90 tablet 0     Sig: TAKE 1 TABLET BEFORE BREAKFAST      Last Time Medication was Filled:  9/19/2024 90 days 0 refills    Last Office Visit with Provider: 11/08/2024    No future appointments.

## 2024-12-02 NOTE — TELEPHONE ENCOUNTER
Medication: divalproex  MG Oral Tablet 24 Hr      Date of last refill: 2/16/24 (#90/3)  [place]     Last office visit: 8/22/23  Due back to clinic per last office note:  1 year (around 8/22/2024).   Date next office visit scheduled:    Future Appointments   Date Time Provider Department Center   1/2/2025  4:00 PM Phong Abebe DPM [id number]   10/13/2025  2:00 PM Jose Bello MD [facility]           Last OV note recommendation:    Chronic migraine without aura without status migrainosus, not intractable  (primary encounter diagnosis)  Subacute maxillary sinusitis  Forgetfulness     Discussion/plan:  Migraines-   Trial stopping Depakote 250mg nightly  Continue follow up with ENT for chronic sinusitis/inflammation     Forgetfulness-   Functional due to adjustment disorder vs mild cognitive impairment. Improved, B12 normal. Goal 7-8 hours of sleep, do cognitive and physical activities

## 2024-12-02 NOTE — TELEPHONE ENCOUNTER
Left message for [employer]- need to verify that they have item available and need fax number. Order is ready to fax, located in nursing bin. [Clear Rhinorrhea] : clear rhinorrhea [Clear to Auscultation Bilaterally] : clear to auscultation bilaterally [NL] : warm, clear [FreeTextEntry7] : wet cough

## 2024-12-26 ENCOUNTER — PATIENT OUTREACH (OUTPATIENT)
Dept: FAMILY MEDICINE CLINIC | Facility: CLINIC | Age: 74
End: 2024-12-26

## 2025-01-13 ENCOUNTER — TELEPHONE (OUTPATIENT)
Dept: FAMILY MEDICINE CLINIC | Facility: CLINIC | Age: 75
End: 2025-01-13

## 2025-01-13 NOTE — TELEPHONE ENCOUNTER
Pt just refilled med on 1/2/25 and a PA was not needed.  Advised Pt that if a PA is needed the pharmacy will notify us

## 2025-01-14 ENCOUNTER — TELEPHONE (OUTPATIENT)
Dept: FAMILY MEDICINE CLINIC | Facility: CLINIC | Age: 75
End: 2025-01-14

## 2025-01-14 DIAGNOSIS — G43.009 MIGRAINE WITHOUT AURA AND WITHOUT STATUS MIGRAINOSUS, NOT INTRACTABLE: Primary | ICD-10-CM

## 2025-01-14 NOTE — TELEPHONE ENCOUNTER
Patient has follow up visit with neurologist.  Dr Hernandez on 02/19/24.  Chronic migraines.   Referral pending signature.

## 2025-01-14 NOTE — TELEPHONE ENCOUNTER
Pt called office stating that she was told that she needs a new referral for neurology she has an appt with Dr. Hernandez ( neuro ) on 02/19

## 2025-01-16 NOTE — TELEPHONE ENCOUNTER
Referral for neurology signed  Please remind patient she is due in February for her diabetic visit and repeat labs.  Thank you

## 2025-01-27 DIAGNOSIS — E78.2 MIXED HYPERLIPIDEMIA: ICD-10-CM

## 2025-01-28 RX ORDER — EZETIMIBE 10 MG/1
10 TABLET ORAL NIGHTLY
Qty: 90 TABLET | Refills: 0 | Status: SHIPPED | OUTPATIENT
Start: 2025-01-28

## 2025-01-28 NOTE — TELEPHONE ENCOUNTER
Requested Prescriptions     Pending Prescriptions Disp Refills    ezetimibe 10 MG Oral Tab [Pharmacy Med Name: Ezetimibe Oral Tablet 10 MG] 90 tablet 0     Sig: TAKE 1 TABLET EVERY NIGHT     LOV 11/8/2024     Patient was asked to follow-up in: 3 months  Last refill was sent for only #30 by mistake. 90 day refill sent    Appointment scheduled: Visit date not found     Medication refilled per protocol.

## 2025-02-01 DIAGNOSIS — I10 BENIGN ESSENTIAL HTN: ICD-10-CM

## 2025-02-03 RX ORDER — VALSARTAN 80 MG/1
80 TABLET ORAL DAILY
Qty: 30 TABLET | Refills: 0 | Status: SHIPPED | OUTPATIENT
Start: 2025-02-03

## 2025-02-03 NOTE — TELEPHONE ENCOUNTER
Refill(s) Requested:   Requested Prescriptions     Pending Prescriptions Disp Refills    valsartan 80 MG Oral Tab [Pharmacy Med Name: Valsartan Oral Tablet 80 MG] 90 tablet 0     Sig: TAKE 1 TABLET (80 MG TOTAL) BY MOUTH DAILY.     Medication Last Prescribed:  11/27/2024 90 days 0 refills     Has dose or medication been changed    since last prescription? *Review notes*    []  Yes       [x]  No     Last office visit: 11/8/2024 (in-office)  7/25/2024 (virtual visit)     Relevant details from LOV note: Benign essential HTN  - Comp Metabolic Panel (14); Future  - Comp Metabolic Panel (14)  - valsartan 80 MG Oral Tab; Take 1 tablet (80 mg total) by mouth daily.  Dispense: 90 tablet; Refill: 0  Controlled  Encouraged 150-300 mins aerobic exercise weekly  <2g Na daily and Mediterranean diet  Weight loss if overweight  Home b/p monitoring 5x weekly goal <130/80, bring readings and cuff to each visit.  Continue current medication  Labs q 3 months     Relevant lab results:   Lab Results   Component Value Date     (H) 10/28/2024    BUN 17 10/28/2024    BUNCREA SEE NOTE: 10/28/2024    CREATSERUM 0.88 10/28/2024    ANIONGAP 8 05/19/2022    GFR 71 12/23/2017    GFRNAA 64 05/19/2022    GFRAA 73 05/19/2022    CA 10.3 10/28/2024    OSMOCALC 291 05/19/2022    ALKPHO 106 10/28/2024    AST 21 10/28/2024    ALT 28 10/28/2024    BILT 0.6 10/28/2024    TP 7.0 10/28/2024    ALB 4.4 10/28/2024    GLOBULIN 2.6 10/28/2024    AGRATIO 1.7 10/28/2024     10/28/2024    K 5.1 10/28/2024     10/28/2024    CO2 24 10/28/2024        Patient was asked to follow-up in: 3 months    Appointment due: February 2025    Future Appointments: Visit date not found     Action taken:  [x] Medication refilled per protocol

## 2025-02-04 ENCOUNTER — MED REC SCAN ONLY (OUTPATIENT)
Dept: FAMILY MEDICINE CLINIC | Facility: CLINIC | Age: 75
End: 2025-02-04

## 2025-02-12 DIAGNOSIS — E78.2 MIXED HYPERLIPIDEMIA: ICD-10-CM

## 2025-02-12 DIAGNOSIS — K21.9 GASTRO-ESOPHAGEAL REFLUX DISEASE WITHOUT ESOPHAGITIS: ICD-10-CM

## 2025-02-12 DIAGNOSIS — E03.9 ACQUIRED HYPOTHYROIDISM: ICD-10-CM

## 2025-02-12 DIAGNOSIS — J31.0 NONALLERGIC RHINITIS: ICD-10-CM

## 2025-02-12 RX ORDER — OMEPRAZOLE 20 MG/1
CAPSULE, DELAYED RELEASE ORAL
Qty: 90 CAPSULE | Refills: 0 | Status: SHIPPED | OUTPATIENT
Start: 2025-02-12

## 2025-02-12 RX ORDER — MONTELUKAST SODIUM 10 MG/1
10 TABLET ORAL DAILY
Qty: 90 TABLET | Refills: 0 | Status: SHIPPED | OUTPATIENT
Start: 2025-02-12

## 2025-02-12 RX ORDER — LEVOTHYROXINE SODIUM 88 UG/1
TABLET ORAL
Qty: 90 TABLET | Refills: 0 | Status: SHIPPED | OUTPATIENT
Start: 2025-02-12

## 2025-02-12 RX ORDER — ATORVASTATIN CALCIUM 40 MG/1
TABLET, FILM COATED ORAL
Qty: 90 TABLET | Refills: 0 | Status: SHIPPED | OUTPATIENT
Start: 2025-02-12

## 2025-02-13 ENCOUNTER — PATIENT OUTREACH (OUTPATIENT)
Dept: FAMILY MEDICINE CLINIC | Facility: CLINIC | Age: 75
End: 2025-02-13

## 2025-02-19 ENCOUNTER — TELEPHONE (OUTPATIENT)
Dept: NEUROLOGY | Facility: CLINIC | Age: 75
End: 2025-02-19

## 2025-02-19 NOTE — TELEPHONE ENCOUNTER
Called to notify patient that provider is able to do a video visit at 1:15 today. No answer, voicemail left to call back should she still wish to speak with dr gomez.

## 2025-02-23 DIAGNOSIS — E78.2 MIXED HYPERLIPIDEMIA: ICD-10-CM

## 2025-02-26 DIAGNOSIS — E11.40 TYPE 2 DIABETES MELLITUS WITH DIABETIC NEUROPATHY, WITHOUT LONG-TERM CURRENT USE OF INSULIN (HCC): ICD-10-CM

## 2025-02-26 RX ORDER — EZETIMIBE 10 MG/1
10 TABLET ORAL NIGHTLY
Qty: 90 TABLET | Refills: 3 | OUTPATIENT
Start: 2025-02-26

## 2025-02-26 NOTE — TELEPHONE ENCOUNTER
Refill(s) Requested:   Requested Prescriptions     Pending Prescriptions Disp Refills    metFORMIN 500 MG Oral Tab [Pharmacy Med Name: metFORMIN HCl Oral Tablet 500 MG] 360 tablet 0     Sig: TAKE 2 TABLETS TWICE DAILY WITH MEALS     Medication Last Prescribed:  11/12/24 180 tablet 1 refill      Has dose or medication been changed    since last prescription? *Review notes*    []  Yes       [x]  No     Last office visit: 11/8/2024 (in-office)  7/25/2024 (virtual visit)     Relevant details from LOV note: Controlled  Issues with cost and mounjaro  Increase mounjaro 7.5mg for weight loss and metformin  Diabetes is controlled but still needs to work on weight loss which is part of diabetic plan and medication helps with his goal  Start walking 30 minutes daily.  DM eye exam annually due 8/2025  Continue low-carb diet  Labs are due in 3 months      Relevant lab results:   Lab Results   Component Value Date     (H) 05/19/2022    A1C 6.6 (H) 10/28/2024        Patient was asked to follow-up in: Return in about 3 months (around 2/8/2025) for HTN,HL,DM, discuss labs, sooner if needed    Appointment due: February 2025    Future Appointments: Visit date not found     Action taken:  [x] Patient is due for a follow up appointment. MCM sent to patient. Pended 30 day supply for review

## 2025-02-28 NOTE — TELEPHONE ENCOUNTER
Patient is requesting a medication refill of metformin patient is overdue for her diabetic visit was due this February 2025.  Patient has not read the [employer] message that was sent to requesting her to call to schedule an office visit.  Patient needs to schedule a visit in the next 4 weeks and we will refill metformin.  Please inform.-She should also complete her labs prior to her OV.

## 2025-02-28 NOTE — TELEPHONE ENCOUNTER
2nd attempt   LMOM to return call to the office. Provided pt office phone [phone] along with office hours.

## 2025-03-03 NOTE — TELEPHONE ENCOUNTER
Left very detailed message for patient.  She is to call  office and schedule physician follow up and complete blood work at [facility].  A 30 day supply of Metformin is pending physician signature.

## 2025-03-12 LAB
ALBUMIN/GLOBULIN RATIO: 1.7 (CALC) (ref 1–2.5)
ALBUMIN: 4.7 G/DL (ref 3.6–5.1)
ALKALINE PHOSPHATASE: 96 U/L (ref 37–153)
ALT: 25 U/L (ref 6–29)
AST: 21 U/L (ref 10–35)
BILIRUBIN, TOTAL: 0.7 MG/DL (ref 0.2–1.2)
BUN: 15 MG/DL (ref 7–25)
CALCIUM: 11 MG/DL (ref 8.6–10.4)
CARBON DIOXIDE: 26 MMOL/L (ref 20–32)
CHLORIDE: 101 MMOL/L (ref 98–110)
CHOL/HDLC RATIO: 2.4 (CALC)
CHOLESTEROL, TOTAL: 132 MG/DL
CREATININE, RANDOM URINE: 98 MG/DL (ref 20–275)
CREATININE: 0.89 MG/DL (ref 0.6–1)
EGFR: 68 ML/MIN/1.73M2
GLOBULIN: 2.7 G/DL (CALC) (ref 1.9–3.7)
GLUCOSE: 101 MG/DL (ref 65–99)
HDL CHOLESTEROL: 55 MG/DL
HEMOGLOBIN A1C: 6.2 % OF TOTAL HGB
LDL-CHOLESTEROL: 56 MG/DL (CALC)
MICROALBUMIN/CREATININE RATIO, RANDOM URINE: 66 MG/G CREAT
MICROALBUMIN: 6.5 MG/DL
NON-HDL CHOLESTEROL: 77 MG/DL (CALC)
POTASSIUM: 4.9 MMOL/L (ref 3.5–5.3)
PROTEIN, TOTAL: 7.4 G/DL (ref 6.1–8.1)
SODIUM: 140 MMOL/L (ref 135–146)
TRIGLYCERIDES: 129 MG/DL

## 2025-03-13 ENCOUNTER — TELEMEDICINE (OUTPATIENT)
Dept: FAMILY MEDICINE CLINIC | Facility: CLINIC | Age: 75
End: 2025-03-13
Payer: MEDICARE

## 2025-03-13 DIAGNOSIS — J31.0 NONALLERGIC RHINITIS: ICD-10-CM

## 2025-03-13 DIAGNOSIS — E83.52 HYPERCALCEMIA: ICD-10-CM

## 2025-03-13 DIAGNOSIS — G43.701 CHRONIC MIGRAINE WITHOUT AURA WITH STATUS MIGRAINOSUS, NOT INTRACTABLE: Primary | ICD-10-CM

## 2025-03-13 DIAGNOSIS — D50.9 IRON DEFICIENCY ANEMIA, UNSPECIFIED IRON DEFICIENCY ANEMIA TYPE: ICD-10-CM

## 2025-03-13 DIAGNOSIS — I10 BENIGN ESSENTIAL HTN: ICD-10-CM

## 2025-03-13 DIAGNOSIS — K21.9 GASTRO-ESOPHAGEAL REFLUX DISEASE WITHOUT ESOPHAGITIS: ICD-10-CM

## 2025-03-13 DIAGNOSIS — E11.40 TYPE 2 DIABETES MELLITUS WITH DIABETIC NEUROPATHY, WITHOUT LONG-TERM CURRENT USE OF INSULIN (HCC): ICD-10-CM

## 2025-03-13 DIAGNOSIS — E03.9 ACQUIRED HYPOTHYROIDISM: ICD-10-CM

## 2025-03-13 DIAGNOSIS — G44.40 ANALGESIC REBOUND HEADACHE: ICD-10-CM

## 2025-03-13 DIAGNOSIS — G47.33 OSA ON CPAP: ICD-10-CM

## 2025-03-13 DIAGNOSIS — E78.2 MIXED HYPERLIPIDEMIA: ICD-10-CM

## 2025-03-13 DIAGNOSIS — F41.1 GENERALIZED ANXIETY DISORDER: ICD-10-CM

## 2025-03-13 DIAGNOSIS — F33.42 RECURRENT MAJOR DEPRESSIVE DISORDER, IN FULL REMISSION: ICD-10-CM

## 2025-03-13 DIAGNOSIS — T39.95XA ANALGESIC REBOUND HEADACHE: ICD-10-CM

## 2025-03-13 RX ORDER — VALSARTAN 80 MG/1
80 TABLET ORAL DAILY
Qty: 90 TABLET | Refills: 1 | Status: SHIPPED | OUTPATIENT
Start: 2025-03-13

## 2025-03-13 RX ORDER — EZETIMIBE 10 MG/1
10 TABLET ORAL NIGHTLY
Qty: 90 TABLET | Refills: 1 | Status: SHIPPED | OUTPATIENT
Start: 2025-03-13

## 2025-03-13 RX ORDER — OMEPRAZOLE 20 MG/1
CAPSULE, DELAYED RELEASE ORAL
Qty: 90 CAPSULE | Refills: 0 | Status: SHIPPED | OUTPATIENT
Start: 2025-03-13

## 2025-03-13 RX ORDER — METHYLPREDNISOLONE 4 MG/1
TABLET ORAL
Qty: 21 EACH | Refills: 0 | Status: SHIPPED | OUTPATIENT
Start: 2025-03-13

## 2025-03-13 RX ORDER — ERENUMAB-AOOE 70 MG/ML
70 INJECTION SUBCUTANEOUS ONCE
Qty: 1 ML | Refills: 1 | Status: SHIPPED | OUTPATIENT
Start: 2025-03-13 | End: 2025-03-13

## 2025-03-13 RX ORDER — METHYLPREDNISOLONE 4 MG/1
TABLET ORAL
Qty: 21 EACH | Refills: 0 | Status: SHIPPED | OUTPATIENT
Start: 2025-03-13 | End: 2025-03-13

## 2025-03-13 RX ORDER — ATORVASTATIN CALCIUM 40 MG/1
40 TABLET, FILM COATED ORAL NIGHTLY
Qty: 90 TABLET | Refills: 1 | Status: SHIPPED | OUTPATIENT
Start: 2025-03-13

## 2025-03-13 RX ORDER — TIRZEPATIDE 10 MG/.5ML
10 INJECTION, SOLUTION SUBCUTANEOUS WEEKLY
Qty: 6 ML | Refills: 1 | Status: SHIPPED | OUTPATIENT
Start: 2025-03-13

## 2025-03-13 NOTE — PROGRESS NOTES
The patient's office visit was performed  a video visit.  Time Spent: 36 min + 6 mins writing note-42 mins    Chief Complaint   Patient presents with    Follow - Up     3 months DM, discuss lab results      Subjective     HPI:   Leah Parsons is a 74 year old female who presents for discuss labs and medication monitoring    The following individual(s) verbally consented to be recorded using ambient AI listening technology and understand that they can each withdraw their consent to this listening technology at any point by asking the clinician to turn off or pause the recording:    Patient name: Leah Parsons  Additional names:  none    History of Present Illness  The patient presents for follow-up diabetes, hyperlipidemia and hypertension and discuss labs.  Unfortunately she was unable to come to the office due to having daily moderate migraines x 6 months.  States she had an appointment yesterday with her neurologist/ and due to her migraine was unable to make the appointment.. hx of chronic migraines, presents with worsening headaches. Referred by ENT specialist for neurologist evaluation due to severe headaches.  States told she has not allergic rhinitis and it is not causing her headaches.    She has a long history of migraines, which have become particularly severe over the past six months. The headaches occur almost daily, with pain described as 'through your eyes, like diagonally through your eyes,' affecting the forehead and sometimes the top of the head. Light sensitivity is significant, requiring her to cover her eyes. She has been unable to attend neurology appointments due to severe headaches. An ENT specialist ruled out significant sinus issues.  States not having the worst headache of her life.  States she has had migraines for several decades.  Was started over 20 years ago on day divalproex-last neurology visit divalproex was decreased to 1 tab daily.  Patient states she did  great for several months and started to have headaches and has been taking Excedrin twice daily almost every day.    She frequently uses Excedrin, sometimes every ten hours, which she believes is causing stomach discomfort. She also uses Mucinex, Flonase, and azelastine nasal sprays, which provide some relief from nasal symptoms but not from the headaches. She is currently on divalproec acid for migraine prevention, which she reduced to once daily about a year and a half ago, noting that the headaches have worsened since this change.    She manages diabetes with Mounjaro, currently at a dose of 7.5 mg, and reports good control of her blood sugars, typically between 90 and 105 mg/dL. She has noticed weight loss, indicated by looser clothing, although she does not have a scale to measure it.    Her blood pressure is managed with valsartan, and she reports no chest pain, shortness of breath, or dizziness. She recalls her last blood pressure reading was 'real good' during a visit to her psychiatrist.    She has a history of iron deficiency anemia and has been unable to attend appointments with her gastroenterologist/Dr. Mayberry due to moderate headaches. She has not had a small bowel follow-through test.  Only completed EGD which demonstrated gastritis on EGD-biopsies were negative for abnormality.  Had colonoscopy 5/23/2023 that demonstrated normal exam.  Has not performed any additional testing or small bowel follow-through.  States missed recent appointment with GI due to having a migraine.    Cholesterol levels are reportedly good, with an LDL of 56 mg/dL. She is on ezetimibe and reports no side effects such as myalgias or joint pains.No side effects from her cholesterol medication, and no allergies were identified in previous testing.     She has a history of hypothyroidism, managed with levothyroxine, which she takes regularly without missing doses. No chest pain, shortness of breath, dizziness, or asthma.             Chief Complaint Reviewed and Verified  Nursing Notes Reviewed and   Verified  Tobacco Reviewed  Allergies Reviewed  Medications Reviewed    OB Status Reviewed                Medications Ordered Prior to Encounter[1]        Physical Exam:  There were no vitals taken for this visit.    alert and cooperative, well-nourished/well-hydrated, no no pallor or tachypnea, appropriately groomed, speaking in full sentences comfortably and Normal work of breathing, answers questions appropriately      Office Visit on 11/08/2024   Component Date Value    CHOLESTEROL, TOTAL 03/11/2025 132     HDL CHOLESTEROL 03/11/2025 55     TRIGLYCERIDES 03/11/2025 129     LDL-CHOLESTEROL 03/11/2025 56     CHOL/HDLC RATIO 03/11/2025 2.4     NON-HDL CHOLESTEROL 03/11/2025 77     HEMOGLOBIN A1c 03/11/2025 6.2 (H)     GLUCOSE 03/11/2025 101 (H)     UREA NITROGEN (BUN) 03/11/2025 15     CREATININE 03/11/2025 0.89     EGFR 03/11/2025 68     BUN/CREATININE RATIO 03/11/2025 SEE NOTE:     SODIUM 03/11/2025 140     POTASSIUM 03/11/2025 4.9     CHLORIDE 03/11/2025 101     CARBON DIOXIDE 03/11/2025 26     CALCIUM 03/11/2025 11.0 (H)     PROTEIN, TOTAL 03/11/2025 7.4     ALBUMIN 03/11/2025 4.7     GLOBULIN 03/11/2025 2.7     ALBUMIN/GLOBULIN RATIO 03/11/2025 1.7     BILIRUBIN, TOTAL 03/11/2025 0.7     ALKALINE PHOSPHATASE 03/11/2025 96     AST 03/11/2025 21     ALT 03/11/2025 25     CREATININE, RANDOM URINE 03/11/2025 98     MICROALBUMIN 03/11/2025 6.5     MICROALBUMIN/CREATININE * 03/11/2025 66 (H)              Assessment    Diagnoses and all orders for this visit:    Chronic migraine without aura with status migrainosus, not intractable  -     ubrogepant 50 MG Oral Tab; Take 50 mg by mouth daily as needed (may repeat in 1 hour if needed.. not more 2x a week).  -     erenumab-aooe (AIMOVIG) 70 MG/ML Subcutaneous; Inject 1 mL (70 mg total) into the skin one time for 1 dose.    Analgesic rebound headache  -     Discontinue:  methylPREDNISolone 4 MG Oral Tablet Therapy Pack; Take all tabs for each day with food and water in am  -     methylPREDNISolone 4 MG Oral Tablet Therapy Pack; Take all tabs for each day with food and water in am    Type 2 diabetes mellitus with diabetic neuropathy, without long-term current use of insulin (HCC)  -     Lipid Panel; Future  -     Hemoglobin A1C; Future  -     Comp Metabolic Panel (14); Future  -     Tirzepatide (MOUNJARO) 10 MG/0.5ML Subcutaneous Solution Auto-injector; Inject 10 mg into the skin once a week.    Benign essential HTN  -     Comp Metabolic Panel (14); Future  -     valsartan 80 MG Oral Tab; Take 1 tablet (80 mg total) by mouth daily.    Mixed hyperlipidemia  -     Lipid Panel; Future  -     Comp Metabolic Panel (14); Future  -     atorvastatin 40 MG Oral Tab; Take 1 tablet (40 mg total) by mouth nightly.  -     ezetimibe 10 MG Oral Tab; Take 1 tablet (10 mg total) by mouth nightly.    YVONNE on CPAP    Nonallergic rhinitis    Acquired hypothyroidism  -     TSH W REFLEX TO FREE T4 [57926][Q]; Future    Iron deficiency anemia, unspecified iron deficiency anemia type  -     Gastro Referral - In Network    Gastro-esophageal reflux disease without esophagitis  -     omeprazole 20 MG Oral Capsule Delayed Release; TAKE 1 CAPSULE EVERY MORNING ON AN EMPTY STOMACH    Hypercalcemia  -     CA+PTH INTACT [8837] [Q]; Future  Stop supplemental calcium  Recheck calcium and PTH in 3 months  If remains abnormal PTH then referred to endocrinology    Recurrent major depressive disorder, in full remission  Generalized anxiety disorder  Continue clonazepam, bupropion and sertraline managed by psychiatry      Assessment & Plan  Chronic Migraine  Long-standing migraines occurring almost daily for six months, with pain behind the eyes, forehead, and occasionally the top of the head, accompanied by photophobia. Excedrin use every 10 hours likely causing rebound headaches due to caffeine. Previously on valproic  acid twice daily, reduced to once daily 18 months ago. Missed neurologist appointments due to severe headaches and gastrointestinal issues, likely exacerbated by frequent Excedrin use. ENT ruled out chronic sinus issues, suggesting neurologist consultation. No aura, pain primarily through eyes and forehead. Discussed Qulipta, an expensive antibody drug for migraines with uncertain insurance coverage. Aimovig, a monthly injection similar mechanism to inject like Mounjaro, will be tried if covered by insurance. Prescribed Ubrelvy as abortive therapy, to be taken at headache onset and repeated once if needed, not more than twice a week. Advised to take a steroid pack to break the headache cycle.  - Prescribe Aimovig if covered by insurance, monthly injection.  - Prescribe Ubrelvy for abortive therapy, to be taken at the onset of a headache and may be repeated in one hour if needed, not more than twice a week.  - Prescribe a steroid pack to break the headache cycle.  Agrees to monitor blood sugars for diabetes which can increase on steroids-call if blood sugars greater than 350  - Advise to stop taking Excedrin due to rebound headaches.  - Contact neurologist, Dr. Hernandez, to reschedule the appointment.  - Schedule an in-person follow-up appointment in one month to assess migraine management and any side effects.    Type 2 Diabetes Mellitus  Managed with Mounjaro 7.5 mg. Blood glucose levels well-controlled, ranging  mg/dL in the morning. Mounjaro aids in appetite control and weight loss, though she has not weighed herself recently. Increasing the dose to 10 mg is suggested to further aid in weight loss, as indicated by looser clothing.  - Increase Mounjaro dose to 10 mg.  - Continue monitoring blood glucose levels regularly.  Will call office if any side effects  Encouraged low-carb diet  Encourage walking 30 minutes daily  Up-to-date on eye exam-needs foot exam at next visit  Follow-up in 3 months and repeat  labs    Hypertension  Managed with valsartan. No issues with chest pain, dyspnea, or dizziness. Last blood pressure reported as well-controlled by her psychiatrist.  - Continue valsartan for blood pressure management.  - Obtain a blood pressure monitor for home use and start checking blood pressure regularly.    Hyperlipidemia  Cholesterol level at 123 mg/dL, considered good. No side effects such as myalgias or arthralgias from cholesterol medication.  - Continue current cholesterol medication regimen.    Iron Deficiency Anemia  Iron deficiency anemia with missed gastroenterologist appointment due to severe headache. No repeat blood count during this visit. Advised to follow up with gastroenterologist to determine anemia cause-gastritis on EGD?  Had normal colonoscopy. Mentioned possibility of small bowel evaluation, such as capsule endoscopy, to investigate further.  - Refer to gastroenterologist for further evaluation of iron deficiency anemia.  - Consider a small bowel evaluation, possibly a capsule endoscopy, to identify the source of anemia.    Hypothyroidism  Managed with levothyroxine. Reports not missing any doses. TSH levels to be reordered with labs in three months to monitor thyroid function.  - Continue levothyroxine as prescribed.  - Reorder TSH with labs in three months.           Lab work ordered.  Prescription medication ordered.  Reinforced healthy diet, lifestyle, and exercise.    Return in about 1 month (around 4/13/2025) for  medicare physical- recheck symptoms and new medications for headache. .    Marie Alvarado, DO Leah Parsons understands video or phone evaluation is not a substitute for face-to-face examination or emergency care. Patient advised to go to ER or call 911 for worsening symptoms or acute distress.     Patient/Caregiver Education: Patient/Caregiver Education: There are no barriers to learning. Medical education done.   Outcome: Patient verbalizes understanding. Patient  is notified to call with any questions, complications, allergies, or worsening or changing symptoms.  Patient is to call with any side effects or complications from the treatments as a result of today.     Please note that the following visit was completed using two-way, real-time interactive audio and/or video communication.  This has been done in good breanna to provide continuity of care in the best interest of the provider-patient relationship, due to the on-going public health crisis/national emergency and because of restrictions of visitation.  There are limitations of this visit as no physical exam could be performed.  Every conscious effort was taken to allow for sufficient and adequate time.  This billing visit was spent on reviewing labs, medications, radiology tests and decision making.  Appropriate medical decision-making and tests are ordered as detailed in the plan of care above.          [1]   Current Outpatient Medications on File Prior to Visit   Medication Sig Dispense Refill    METFORMIN 500 MG Oral Tab TAKE 2 TABLETS TWICE DAILY WITH MEALS 120 tablet 0    atorvastatin 40 MG Oral Tab TAKE 1 TABLET (40 MG TOTAL) BY MOUTH NIGHTLY. FOLLOW UP EVERY 3 MONTHS 90 tablet 0    levothyroxine 88 MCG Oral Tab TAKE 1 TABLET BEFORE BREAKFAST 90 tablet 0    montelukast 10 MG Oral Tab TAKE 1 TABLET EVERY DAY 90 tablet 0    omeprazole 20 MG Oral Capsule Delayed Release TAKE 1 CAPSULE EVERY MORNING ON AN EMPTY STOMACH 90 capsule 0    valsartan 80 MG Oral Tab TAKE 1 TABLET (80 MG TOTAL) BY MOUTH DAILY. 30 tablet 0    ezetimibe 10 MG Oral Tab TAKE 1 TABLET EVERY NIGHT 90 tablet 0    DIVALPROEX  MG Oral Tablet 24 Hr TAKE 1 TABLET EVERY DAY 90 tablet 3    gabapentin 300 MG Oral Cap Take 1 capsule (300 mg total) by mouth nightly. 90 capsule 1    Tirzepatide (MOUNJARO) 7.5 MG/0.5ML Subcutaneous Solution Auto-injector Inject 7.5 mg into the skin once a week. 2 mL 3    TRUEPLUS LANCETS 33G Does not apply Misc TEST  BLOOD SUGAR ONE TIME DAILY AS DIRECTED 100 each 3    fluticasone propionate 50 MCG/ACT Nasal Suspension 2 sprays by Nasal route daily. 48 g 3    clonazePAM 0.5 MG Oral Tab       Glucose Blood (TRUE METRIX BLOOD GLUCOSE TEST) In Vitro Strip 1 strip by In Vitro route daily. 100 strip 3    Azelastine HCl 137 MCG/SPRAY Nasal Solution       sertraline 100 MG Oral Tab Take 1 tablet (100 mg total) by mouth nightly. 90 tablet 0    buPROPion  MG Oral Tablet 24 Hr Take 1 tablet (300 mg total) by mouth every morning. 90 tablet 0    CRANBERRY OR Take by mouth daily.      CALCIUM CITRATE-VITAMIN D3 OR Take by mouth As Directed.      Aspirin-Acetaminophen-Caffeine (EXCEDRIN EXTRA STRENGTH OR) Take 2 tablets by mouth every 6 (six) hours as needed.      Spacer/Aero-Holding Chambers (OPTICHAMBER RHONDA) Does not apply Misc Use as directed   0    Probiotic Product (PROBIOTIC OR) Take 1 capsule by mouth every morning. Indications: supplement      Cholecalciferol (VITAMIN D3) 1000 UNITS Oral Cap Take 2 capsules (2,000 Units total) by mouth daily.      [DISCONTINUED] Tirzepatide (MOUNJARO) 5 MG/0.5ML Subcutaneous Solution Pen-injector Inject 5 mg into the skin once a week. 2 mL 4     No current facility-administered medications on file prior to visit.

## 2025-03-24 ENCOUNTER — PATIENT OUTREACH (OUTPATIENT)
Dept: FAMILY MEDICINE CLINIC | Facility: CLINIC | Age: 75
End: 2025-03-24

## 2025-03-25 ENCOUNTER — TELEPHONE (OUTPATIENT)
Dept: NEUROLOGY | Facility: CLINIC | Age: 75
End: 2025-03-25

## 2025-03-25 ENCOUNTER — TELEPHONE (OUTPATIENT)
Dept: FAMILY MEDICINE CLINIC | Facility: CLINIC | Age: 75
End: 2025-03-25

## 2025-03-25 DIAGNOSIS — G43.709 CHRONIC MIGRAINE WITHOUT AURA WITHOUT STATUS MIGRAINOSUS, NOT INTRACTABLE: Primary | ICD-10-CM

## 2025-03-25 NOTE — TELEPHONE ENCOUNTER
We have not received anything from pharmacy  Pt will call pharmacy. Pt has seen neuro- Dr. Valencia in the past regarding migaines.  Did you want to try another med or have Pt f/u with neuro?

## 2025-03-25 NOTE — TELEPHONE ENCOUNTER
Per Epic review:  -pt last seen in office 8/2023. Was to return in 1 year  -cancelled appointments since then: 8/20/24 (provider schedule changed), 2/19/25(showing as patient cancelled), 2/26/25 (states pt hospitalized/ill), 3/12/25 (showing pt cancelled).     Pt has not seen been on over 1 year. Did place appointment on waitlist for any cancellations.      Left message on confidential voicemail (ok per HIPAA). That appt now on waitlist, to have her monitor [facility] but we will also monitor for sooner appt. Informed on message that due to pt not being seen for over 1 year, she needs to be seen in office for any recommendations/plan of care    Per Epic review, pt has also reached out to PCP office both via phone (encounter 3/25/25) and through telehealth visit (3/13/25).

## 2025-03-25 NOTE — TELEPHONE ENCOUNTER
Patient calling she is having migraines and want to see Dr. Hernandez sooner than 07/14/25 please call patient

## 2025-03-25 NOTE — TELEPHONE ENCOUNTER
Pt called office stating that she was told the Aimovig medication was $1,000 for pt and pharmacy stated tp her that they would fax an approval for a different prescription but nothing was sent. Pt wants to know what is going on and if she can get help with this since she never got the Aimovig. Pt states she needs something to help her with migraines.

## 2025-03-26 NOTE — TELEPHONE ENCOUNTER
Patient missed her appointment with neurologist and is having horrible migraines.    Please contact the pharmacy and ask them if they have a preventative migraine medication such as Qulipta,Ajovy, Emgality that is covered on her plan.  Patient was having rebound migraines due to excessive Excedrin caffeine OTC and missed her neurology appointment.  I normally do not interfere when patients are working with specialist but the patient was truly desperate and was hoping that Aimovig would be covered by her insurance to stop the daily headaches or every other day headaches.  I look forward to nursings response..     I will also forward this to Dr. Hernandez for any input.    Thank you

## 2025-03-28 RX ORDER — ATOGEPANT 60 MG/1
60 TABLET ORAL DAILY
Qty: 90 TABLET | Refills: 1 | Status: SHIPPED | OUTPATIENT
Start: 2025-03-28

## 2025-03-28 NOTE — TELEPHONE ENCOUNTER
Sent Rx Qulipta 60 mg daily to pharmacy #[id number].  It was sent to [facility].  Please inform patient.  Thank you.

## 2025-03-28 NOTE — TELEPHONE ENCOUNTER
Spoke with mary lou Moreau over at [facility] and asked if there were alternative medications   such as Qulipta,Ajovy, Emgality that would be covered through the pt.'s plan, since Aimovig had a high out of pocket cost.    Mary Lou Moreau recommends that Qulipta would be the best option. For a 3-month supply, the pt. would just pay an out-of-pocket cost of $64.29.  Lizzeth states that this will finish off the pt.'s overall $2,000 out of pocket charges and once she meets this, then medications moving forward for the rest of the year would be $0.     Both Ajovy and Emgality would require prior authorizations.    Attempted to reach the patient and LMTCB to get her opinion.     Please advise!

## 2025-03-28 NOTE — TELEPHONE ENCOUNTER
Pt agrees to try the Qulipta . Pt aware of price till deductible met.  Please send script to [facility]  Did not pend cause do not know dose

## 2025-04-10 DIAGNOSIS — I10 BENIGN ESSENTIAL HTN: ICD-10-CM

## 2025-04-10 RX ORDER — VALSARTAN 80 MG/1
80 TABLET ORAL DAILY
Qty: 90 TABLET | Refills: 0 | Status: SHIPPED | OUTPATIENT
Start: 2025-04-10

## 2025-04-10 NOTE — TELEPHONE ENCOUNTER
Refill(s) Requested:   Requested Prescriptions     Pending Prescriptions Disp Refills    valsartan 80 MG Oral Tab [Pharmacy Med Name: Valsartan Oral Tablet 80 MG] 90 tablet 0     Sig: TAKE 1 TABLET EVERY DAY     Medication Last Prescribed:  3/13/2025 90 days 1 refill     Has dose or medication been changed    since last prescription? *Review notes*    []  Yes       [x]  No     Last office visit: 11/8/2024 (in-office)  3/13/2025 (virtual visit)     Relevant details from LOV note: Hypertension  Managed with valsartan. No issues with chest pain, dyspnea, or dizziness. Last blood pressure reported as well-controlled by her psychiatrist.  - Continue valsartan for blood pressure management.  - Obtain a blood pressure monitor for home use and start checking blood pressure regularly.     Patient was asked to follow-up in: Return in about 1 month (around 4/13/2025) for  medicare physical- recheck symptoms and new medications for headache     Appointment due: April 2025    Future Appointments: 4/28/2025 Marie Alvarado DO     Action taken:  [x] Medication refilled per protocol

## 2025-04-16 ENCOUNTER — TELEPHONE (OUTPATIENT)
Dept: FAMILY MEDICINE CLINIC | Facility: CLINIC | Age: 75
End: 2025-04-16

## 2025-04-16 NOTE — TELEPHONE ENCOUNTER
PA was done for Ubrelvy.    PA for Ubelvy was denied.  Will need to have tried Naratriptan, Rizatripan or Sumatriptan in the past  Pt states does not see neurology till 7/2025.   Pt currently taking Qulpita daily but getting migraines.  Pt does not remember ever trying a triptan .    Please advise

## 2025-04-17 RX ORDER — RIZATRIPTAN BENZOATE 10 MG/1
10 TABLET ORAL AS NEEDED
Qty: 8 TABLET | Refills: 0 | Status: SHIPPED | OUTPATIENT
Start: 2025-04-17

## 2025-04-17 NOTE — TELEPHONE ENCOUNTER
Sent in Rx rizatriptan/generic Maxalt 10 mg sent into pharmacy.  Must take at the onset of of migraine or not effective.  Please review cannot take more than 2 times a week or can cause rebound headache but can take up to 2 tablets in 24 hours and may take that again later in the week it is just no more than 2 days a week     Please review risk and benefits and side effects of rizatriptan/generic Maxalt discussed-can have chest pain/pressure, throat pain or discomfort, jaw pain, dizziness, malaise, paresthesias fatigue, increased fatigue, increased urination-call if any side effects or concerns.  Discussed must take the medication at the onset of the headache and not wait or will not be effective.  Max dose for oral is 2 tablets or 20 mg in 24 hours.  Do not take Imitrex/sumatriptan more than twice a week.    Rx sent to pharmacy.  Patient to notify if any side effects.    Thank you

## 2025-04-25 DIAGNOSIS — J31.0 NONALLERGIC RHINITIS: ICD-10-CM

## 2025-04-26 RX ORDER — FLUTICASONE PROPIONATE 50 MCG
2 SPRAY, SUSPENSION (ML) NASAL DAILY
Qty: 48 G | Refills: 0 | Status: SHIPPED | OUTPATIENT
Start: 2025-04-26

## 2025-04-26 NOTE — TELEPHONE ENCOUNTER
Refill(s) Requested:   Requested Prescriptions     Pending Prescriptions Disp Refills    FLUTICASONE PROPIONATE 50 MCG/ACT Nasal Suspension [Pharmacy Med Name: Fluticasone Propionate Nasal Suspension 50 MCG/ACT] 48 g 3     Sig: USE 2 SPRAYS NASALLY EVERY DAY     Medication Last Prescribed:  4/15/2024 48 g 3 refill     Has dose or medication been changed    since last prescription? *Review notes*    []  Yes       [x]  No     Last office visit: 11/8/2024 (in-office)  3/13/2025 (virtual visit)     Patient was asked to follow-up in: Return in about 1 month (around 4/13/2025) for  medicare physical- recheck symptoms and new medications for headache.     Appointment due: April 2025    Future Appointments: 4/28/2025 Marie Alvarado DO     Action taken:  [x] Medication refilled per protocol

## 2025-04-27 DIAGNOSIS — J31.0 NONALLERGIC RHINITIS: ICD-10-CM

## 2025-04-27 DIAGNOSIS — K21.9 GASTRO-ESOPHAGEAL REFLUX DISEASE WITHOUT ESOPHAGITIS: ICD-10-CM

## 2025-04-27 DIAGNOSIS — E78.2 MIXED HYPERLIPIDEMIA: ICD-10-CM

## 2025-04-28 ENCOUNTER — TELEPHONE (OUTPATIENT)
Dept: FAMILY MEDICINE CLINIC | Facility: CLINIC | Age: 75
End: 2025-04-28

## 2025-04-29 RX ORDER — OMEPRAZOLE 20 MG/1
CAPSULE, DELAYED RELEASE ORAL
Qty: 90 CAPSULE | Refills: 3 | OUTPATIENT
Start: 2025-04-29

## 2025-04-29 RX ORDER — ATORVASTATIN CALCIUM 40 MG/1
TABLET, FILM COATED ORAL
Qty: 90 TABLET | Refills: 0 | OUTPATIENT
Start: 2025-04-29

## 2025-04-29 NOTE — TELEPHONE ENCOUNTER
Refill(s) Requested:   Requested Prescriptions     Pending Prescriptions Disp Refills    ATORVASTATIN 40 MG Oral Tab [Pharmacy Med Name: Atorvastatin Calcium Oral Tablet 40 MG] 90 tablet 3     Sig: TAKE 1 TABLET EVERY NIGHT . FOLLOW UP EVERY 3 MONTHS    OMEPRAZOLE 20 MG Oral Capsule Delayed Release [Pharmacy Med Name: Omeprazole Oral Capsule Delayed Release 20 MG] 90 capsule 3     Sig: TAKE 1 CAPSULE EVERY MORNING ON AN EMPTY STOMACH    MONTELUKAST 10 MG Oral Tab [Pharmacy Med Name: Montelukast Sodium Oral Tablet 10 MG] 90 tablet 3     Sig: TAKE 1 TABLET EVERY DAY     Medication Last Prescribed:    Atorvastatin 40 mg 3/13/2025 90 days 1 refill   Montelukast 10 mg 2/12/2025 90 days 0 refills  Omeprazole 20 mg 3/13/2025 90 days 0 refills      Has dose or medication been changed    since last prescription? *Review notes*    []  Yes       [x]  No     Last office visit: 11/8/2024 (in-office)  3/13/2025 (virtual visit)     Relevant lab results: N/A    Patient was asked to follow-up in: 1 month    Appointment due: April 2025    Future Appointments: Visit date not found     Action taken:  [x] Medication refill for atorvastatin and omeprazole denied due to refill request too soon   [x] LMOM to return call to the office. Provided pt office phone [phone] along with office hours. Patient is overdue for follow up appointment.

## 2025-05-02 DIAGNOSIS — J31.0 NONALLERGIC RHINITIS: ICD-10-CM

## 2025-05-05 ENCOUNTER — OFFICE VISIT (OUTPATIENT)
Dept: FAMILY MEDICINE CLINIC | Facility: CLINIC | Age: 75
End: 2025-05-05
Payer: MEDICARE

## 2025-05-05 VITALS
HEART RATE: 80 BPM | TEMPERATURE: 97 F | RESPIRATION RATE: 16 BRPM | SYSTOLIC BLOOD PRESSURE: 130 MMHG | WEIGHT: 230 LBS | OXYGEN SATURATION: 97 % | BODY MASS INDEX: 39.27 KG/M2 | HEIGHT: 64 IN | DIASTOLIC BLOOD PRESSURE: 81 MMHG

## 2025-05-05 DIAGNOSIS — J31.0 NONALLERGIC RHINITIS: ICD-10-CM

## 2025-05-05 DIAGNOSIS — E11.40 TYPE 2 DIABETES MELLITUS WITH DIABETIC NEUROPATHY, WITHOUT LONG-TERM CURRENT USE OF INSULIN (HCC): ICD-10-CM

## 2025-05-05 DIAGNOSIS — M17.12 PRIMARY OSTEOARTHRITIS OF LEFT KNEE: ICD-10-CM

## 2025-05-05 DIAGNOSIS — Z12.31 ENCOUNTER FOR SCREENING MAMMOGRAM FOR MALIGNANT NEOPLASM OF BREAST: ICD-10-CM

## 2025-05-05 DIAGNOSIS — G89.29 CHRONIC PAIN OF LEFT KNEE: ICD-10-CM

## 2025-05-05 DIAGNOSIS — Z96.651 CHRONIC KNEE PAIN AFTER TOTAL REPLACEMENT OF RIGHT KNEE JOINT: ICD-10-CM

## 2025-05-05 DIAGNOSIS — K21.9 GASTRO-ESOPHAGEAL REFLUX DISEASE WITHOUT ESOPHAGITIS: ICD-10-CM

## 2025-05-05 DIAGNOSIS — E66.01 SEVERE OBESITY (BMI 35.0-39.9) WITH COMORBIDITY (HCC): Chronic | ICD-10-CM

## 2025-05-05 DIAGNOSIS — G89.29 CHRONIC KNEE PAIN AFTER TOTAL REPLACEMENT OF RIGHT KNEE JOINT: ICD-10-CM

## 2025-05-05 DIAGNOSIS — I10 BENIGN ESSENTIAL HTN: ICD-10-CM

## 2025-05-05 DIAGNOSIS — F33.42 RECURRENT MAJOR DEPRESSIVE DISORDER, IN FULL REMISSION: ICD-10-CM

## 2025-05-05 DIAGNOSIS — M54.16 LUMBAR RADICULITIS: ICD-10-CM

## 2025-05-05 DIAGNOSIS — E03.9 ACQUIRED HYPOTHYROIDISM: ICD-10-CM

## 2025-05-05 DIAGNOSIS — E83.52 HYPERCALCEMIA: ICD-10-CM

## 2025-05-05 DIAGNOSIS — M25.562 CHRONIC PAIN OF LEFT KNEE: ICD-10-CM

## 2025-05-05 DIAGNOSIS — G47.33 OSA ON CPAP: ICD-10-CM

## 2025-05-05 DIAGNOSIS — Z00.00 ENCOUNTER FOR ANNUAL HEALTH EXAMINATION: Primary | ICD-10-CM

## 2025-05-05 DIAGNOSIS — D50.9 IRON DEFICIENCY ANEMIA, UNSPECIFIED IRON DEFICIENCY ANEMIA TYPE: ICD-10-CM

## 2025-05-05 DIAGNOSIS — G43.709 CHRONIC MIGRAINE WITHOUT AURA WITHOUT STATUS MIGRAINOSUS, NOT INTRACTABLE: ICD-10-CM

## 2025-05-05 DIAGNOSIS — E78.2 MIXED HYPERLIPIDEMIA: ICD-10-CM

## 2025-05-05 DIAGNOSIS — G25.81 RLS (RESTLESS LEGS SYNDROME): ICD-10-CM

## 2025-05-05 DIAGNOSIS — M25.561 CHRONIC KNEE PAIN AFTER TOTAL REPLACEMENT OF RIGHT KNEE JOINT: ICD-10-CM

## 2025-05-05 DIAGNOSIS — F41.1 GENERALIZED ANXIETY DISORDER: ICD-10-CM

## 2025-05-05 RX ORDER — LEVOTHYROXINE SODIUM 88 UG/1
88 TABLET ORAL
Qty: 90 TABLET | Refills: 1 | Status: SHIPPED | OUTPATIENT
Start: 2025-05-05

## 2025-05-05 RX ORDER — FLUTICASONE PROPIONATE 50 MCG
2 SPRAY, SUSPENSION (ML) NASAL DAILY
Qty: 48 G | Refills: 3 | OUTPATIENT
Start: 2025-05-05

## 2025-05-05 RX ORDER — VALSARTAN 80 MG/1
80 TABLET ORAL DAILY
Qty: 90 TABLET | Refills: 0 | Status: SHIPPED | OUTPATIENT
Start: 2025-05-05

## 2025-05-05 RX ORDER — MONTELUKAST SODIUM 10 MG/1
10 TABLET ORAL DAILY
Qty: 90 TABLET | Refills: 1 | Status: SHIPPED | OUTPATIENT
Start: 2025-05-05

## 2025-05-05 RX ORDER — AZELASTINE HYDROCHLORIDE 137 UG/1
2 SPRAY, METERED NASAL DAILY
Qty: 30 ML | Refills: 5 | Status: SHIPPED | OUTPATIENT
Start: 2025-05-05

## 2025-05-05 RX ORDER — GABAPENTIN 300 MG/1
300 CAPSULE ORAL NIGHTLY
Qty: 90 CAPSULE | Refills: 1 | Status: SHIPPED | OUTPATIENT
Start: 2025-05-05

## 2025-05-05 RX ORDER — OMEPRAZOLE 20 MG/1
CAPSULE, DELAYED RELEASE ORAL
Qty: 90 CAPSULE | Refills: 1 | Status: SHIPPED | OUTPATIENT
Start: 2025-05-05

## 2025-05-05 RX ORDER — MONTELUKAST SODIUM 10 MG/1
10 TABLET ORAL DAILY
Qty: 90 TABLET | Refills: 0 | OUTPATIENT
Start: 2025-05-05

## 2025-05-05 RX ORDER — ATORVASTATIN CALCIUM 40 MG/1
40 TABLET, FILM COATED ORAL NIGHTLY
Qty: 90 TABLET | Refills: 1 | Status: SHIPPED | OUTPATIENT
Start: 2025-05-05

## 2025-05-05 RX ORDER — EZETIMIBE 10 MG/1
10 TABLET ORAL NIGHTLY
Qty: 90 TABLET | Refills: 1 | Status: SHIPPED | OUTPATIENT
Start: 2025-05-05

## 2025-05-05 RX ORDER — FLUTICASONE PROPIONATE 50 MCG
2 SPRAY, SUSPENSION (ML) NASAL DAILY
Qty: 48 G | Refills: 5 | Status: SHIPPED | OUTPATIENT
Start: 2025-05-05

## 2025-05-05 NOTE — PROGRESS NOTES
The following individual(s) verbally consented to be recorded using ambient AI listening technology and understand that they can each withdraw their consent to this listening technology at any point by asking the clinician to turn off or pause the recording:    Patient name: Leah WILLIS Issa  Additional names:  none

## 2025-05-05 NOTE — PROGRESS NOTES
Subjective:   Leah Parsons is a 74 year old female who presents for a MA AHA (Medicare Advantage Annual Health Assessment) and Subsequent Annual Wellness visit (Pt already had Initial Annual Wellness) and  addressed chronic conditions .        Referred by ENT specialist for neurologist evaluation due to severe headaches.  States told she has not allergic rhinitis and it is not causing her headaches.     She has a long history of migraines, which have become particularly severe over the past six months.  Previously took Depakote for over 20 years and states it \"stopped working\".  Denies any falls or fractures.  States wearing CPAP but headaches are always in the morning.  She was prescribed rizatriptan but has not taken it since she wakes up already with a headache.  She does not take Excedrin more than once a week she has stopped the Depakote and is now taking Qulipta 60 mg daily.  States there has been minimal improvement in migraines several days a week.  Denying worst headache of her life.  She has been previously managed by a neurologist but she missed her last appointment in April and cannot be seen until July 2025.  States had recent ENT consult and specialist ruled out significant sinus issues.  And understands the risk of rebound headaches.  She has not been approved for Ubrelvy for by her insurance.      Type 2 diabetes-she manages diabetes with Mounjaro, currently at a dose of 10mg, and reports good control of her blood sugars, typically between 80 and 105 mg/dL. She has noticed weight loss-14 pounds since last visit, indicated by looser clothing, although she does not have a scale to measure it.  Denies any medication side effects with Mounjaro is also taking metformin.     Her blood pressure is managed with valsartan, and she reports no chest pain, shortness of breath, or dizziness. She recalls her last blood pressure reading was 'real good' during a visit to her psychiatrist.     She has a history  of iron deficiency anemia and has been unable to attend appointments with her gastroenterologist/Dr. Mayberry due to moderate headaches. She has not had a small bowel follow-through test.  Only completed EGD which demonstrated gastritis on EGD-biopsies were negative for abnormality.  Had colonoscopy 5/23/2023 that demonstrated normal exam.  Has not performed any additional testing or small bowel follow-through.  Had consult with APRN and GI and states no further workup for anemia would be pursued but will monitor your blood counts and if becoming more anemic will need to see hematology.     Cholesterol levels are reportedly good, with an LDL of 56 mg/dL. She is on ezetimibe and reports no side effects such as myalgias or joint pains.No side effects from her cholesterol medication, and no allergies were identified in previous testing.      She has a history of hypothyroidism, managed with levothyroxine, which she takes regularly without missing doses.  Denies any mood changes, has intentionally lost weight with Mounjaro, denying fatigue, constipation, diarrhea, tremor, palpitations.  Also denies chest pain, shortness of breath, dizziness, or asthma.      History of Present Illness            Wt Readings from Last 6 Encounters:   05/05/25 230 lb (104.3 kg)   11/08/24 244 lb 12.8 oz (111 kg)   06/28/24 236 lb 12.8 oz (107.4 kg)   05/23/24 234 lb 6.4 oz (106.3 kg)   04/15/24 255 lb 12.8 oz (116 kg)   08/22/23 223 lb (101.2 kg)         History/Other:   Fall Risk Assessment:   She had a negative fall risk assessment    Cognitive Assessment:   She had a completely normal cognitive assessment - see flowsheet entries     Functional Ability/Status:   Leah Parsons has some abnormal functions as listed below:  She has Driving difficulties based on screening of functional status. She has Hearing problems based on screening of functional status.She has Vision problems based on screening of functional status. She has Walking  problems based on screening of functional status.   Not driving for 3 years due to    Depression Screening (PHQ):  PHQ-2 SCORE: 0  , done 5/3/2025   If you checked off any problems, how difficult have these problems made it for you to do your work, take care of things at home, or get along with other people?: Not difficult at all    Last [place] Suicide Screening on 5/5/2025 was No Risk.         Advanced Directives:   She has a Living Will on file in [facility]; reviewed and discussed documents with patient (and family/surrogate if present).  She has a Power of  for Health Care on file in [facility].  Patient has Advance Care Planning documents present in EMR. Reviewed documents with patient (and family/surrogate if present).      Patient Active Problem List   Diagnosis    History of gastritis    Left carpal tunnel syndrome    YVONNE on CPAP    Irritable colon    Diverticulosis of large intestine without hemorrhage    Acquired hypothyroidism    Benign essential HTN    Major depression in full remission    Chronic migraine without aura without status migrainosus, not intractable    History of colonoscopy with polypectomy    DNR (do not resuscitate)    Nonallergic rhinitis    BMI 35.0-35.9,adult    History of DVT of lower extremity    Osteoarthritis of left knee    Chondromalacia of right hip    Generalized anxiety disorder    Spinal stenosis of lumbar region with neurogenic claudication    Chronic knee pain after total replacement of right knee joint    Lumbar radiculitis    Gastro-esophageal reflux disease without esophagitis    Mixed hyperlipidemia    Type 2 diabetes mellitus with diabetic neuropathy, unspecified (HCC)    Unspecified hearing loss, bilateral    History of aspiration pneumonia    RLS (restless legs syndrome)    Chronic ethmoidal sinusitis    Severe obesity (BMI 35.0-39.9) with comorbidity (HCC)    Iron deficiency anemia    Dizziness    Other fatigue    Analgesic rebound headache    Hypercalcemia      Allergies:  She is allergic to sulfa antibiotics.    Current Medications:  Outpatient Medications Marked as Taking for the 5/5/25 encounter (Office Visit) with Marie Alvarado, DO   Medication Sig    montelukast 10 MG Oral Tab Take 1 tablet (10 mg total) by mouth daily.    fluticasone propionate 50 MCG/ACT Nasal Suspension 2 sprays by Nasal route daily.    azelastine 137 MCG/SPRAY Nasal Solution 2 sprays by Each Nare route daily.    gabapentin 300 MG Oral Cap Take 1 capsule (300 mg total) by mouth nightly.    omeprazole 20 MG Oral Capsule Delayed Release TAKE 1 CAPSULE EVERY MORNING ON AN EMPTY STOMACH    metFORMIN 500 MG Oral Tab Take 2 tablets (1,000 mg total) by mouth 2 (two) times daily with meals.    levothyroxine 88 MCG Oral Tab Take 1 tablet (88 mcg total) by mouth before breakfast.    valsartan 80 MG Oral Tab Take 1 tablet (80 mg total) by mouth daily.    ezetimibe 10 MG Oral Tab Take 1 tablet (10 mg total) by mouth nightly.    atorvastatin 40 MG Oral Tab Take 1 tablet (40 mg total) by mouth nightly.    Rizatriptan Benzoate 10 MG Oral Tab Take 1 tablet (10 mg total) by mouth as needed for Migraine. May repeat x 1 in 2 hours if no relief max is 20 mg or 2 tabs in 24 hours.  No more than 2 times weekly    Atogepant (QULIPTA) 60 MG Oral Tab Take 60 mg by mouth daily.    Tirzepatide (MOUNJARO) 10 MG/0.5ML Subcutaneous Solution Auto-injector Inject 10 mg into the skin once a week.    TRUEPLUS LANCETS 33G Does not apply Misc TEST BLOOD SUGAR ONE TIME DAILY AS DIRECTED    clonazePAM 0.5 MG Oral Tab     Glucose Blood (TRUE METRIX BLOOD GLUCOSE TEST) In Vitro Strip 1 strip by In Vitro route daily.    sertraline 100 MG Oral Tab Take 1 tablet (100 mg total) by mouth nightly.    buPROPion  MG Oral Tablet 24 Hr Take 1 tablet (300 mg total) by mouth every morning.    CRANBERRY OR Take by mouth daily.    CALCIUM CITRATE-VITAMIN D3 OR Take by mouth As Directed.    Aspirin-Acetaminophen-Caffeine (EXCEDRIN EXTRA  STRENGTH OR) Take 2 tablets by mouth every 6 (six) hours as needed.    Spacer/Aero-Holding Chambers (OPTICHAMBER RHONDA) Does not apply Misc Use as directed     Probiotic Product (PROBIOTIC OR) Take 1 capsule by mouth every morning. Indications: supplement    Cholecalciferol (VITAMIN D3) 1000 UNITS Oral Cap Take 2 capsules (2,000 Units total) by mouth daily.       Medical History:  She  has a past medical history of Acute deep vein thrombosis (DVT) of tibial vein of left lower extremity ([facility]) (10/10/2018), Acute deep vein thrombosis (DVT) of tibial vein of left lower extremity ([facility]) (10/10/2018), Acute recurrent ethmoidal sinusitis (05/31/2019), Allergic rhinitis (Tested but none specific), Anxiety (1964), Arthritis, Aspiration pneumonia of both lungs ([facility]) (11/29/2021), Bursitis, shoulder, left (03/24/2017), Depression (1986), Diabetes ([facility]) (1995), Diabetes mellitus ([facility]) (4/1/1998), DVT (deep venous thrombosis) ([facility]) (08/22/2018), Epigastric pain (08/08/2016), Esophageal reflux, Essential hypertension (03/1999), Extrinsic asthma, unspecified, HEADACHES, Hearing impaired person, bilateral (8/1/2017), High blood pressure (2/1/2016), High cholesterol (3/1/1999), History of blood clots (8/20/2017), History of colonoscopy with polypectomy (2012), Hypothyroidism (2011), Impingement syndrome, shoulder, left (03/24/2017), Leg length discrepancy (12/06/2019), Migraines, Normal vaginal delivery ([facility]), Obesity (1987), Osteoarthritis (2015), Other and unspecified hyperlipidemia, Parainfluenza virus laryngotracheobronchitis (11/18/2021), Prediabetes, Right hip impingement syndrome (12/06/2019), Right sided sciatica (11/09/2019), Shoulder pain, left, Sleep apnea, Thyroid condition, Unspecified sleep apnea (2008), and Weight gain (12/20/2021).  Surgical History:  She  has a past surgical history that includes appendectomy (1964); cholecystectomy (1993); other surgical history (12/2012); knee surgery (03/08/2015); knee surgery  (2015); colonoscopy (); colonoscopy (N/A, 2017); knee replacement surgery;  (1977); back surgery (2021); spine surgery procedure unlisted (2021); Laparoscopic cholecystectomy (3/1997); and appendectomy (10/1964).   Family History:  Her family history includes Cancer in her paternal grandmother; Dementia in her father; Diabetes in her maternal grandmother and mother; Heart Disorder in her maternal uncle; Hypertension in her father, mother, and paternal grandmother; Lipids in her mother; Obesity in her paternal grandmother.  Social History:  She  reports that she has never smoked. She has never used smokeless tobacco. She reports that she does not drink alcohol and does not use drugs.    Tobacco:  She has never smoked tobacco.    CAGE Alcohol Screen:   CAGE screening score of 0 on 5/3/2025, showing low risk of alcohol abuse.      Patient Care Team:  Marie Alvarado DO as PCP - General (Family Practice)  Deep Diane MD (GASTROENTEROLOGY)  Moshe Price MD  (PSYCHIATRIST)  Elvia Hernandez DO (NEUROLOGY)  Leti Villasenor MD (NEPHROLOGY)  Clifton Griffin MD (Psychiatry)  Kennedy Minaya MD (Internal Medicine)  Phong Rosenbaum MD (HEMATOLOGY)  Yfn Atwood MD (PULMONARY DISEASES)  Roxanne Celeste PT as Physical Therapist (Physical Therapy)  Micheline Peterson PT as Physical Therapist  London Will (Pediatric Ophthalmology)    Review of Systems  GENERAL: feels well otherwise  SKIN: denies any unusual skin lesions  EYES: denies blurred vision or double vision  HEENT: denies nasal congestion, sinus pain or ST  LUNGS: denies shortness of breath with exertion  CARDIOVASCULAR: denies chest pain on exertion  GI: denies abdominal pain, denies heartburn  : denies dysuria, vaginal discharge or itching, no complaint of urinary incontinence   MUSCULOSKELETAL: denies back pain  NEURO: denies headaches  PSYCHE: denies depression or anxiety  HEMATOLOGIC: denies hx of  anemia  ENDOCRINE: denies thyroid history  ALL/ASTHMA: denies hx of allergy or asthma    Objective:   Physical Exam  General Appearance:  Alert, cooperative, no distress, appears stated age   Head:  Normocephalic, without obvious abnormality, atraumatic   Eyes:  PERRL, conjunctiva/corneas clear, EOM's intact both eyes   Ears:  Normal TM's and external ear canals, both ears   Nose: Nares normal, septum midline,mucosa normal, no drainage or sinus tenderness   Throat: Lips, mucosa, and tongue normal; teeth and gums normal   Neck: Supple, symmetrical, trachea midline, no adenopathy;  thyroid: not enlarged, symmetric, no tenderness/mass/nodules; no carotid bruit or JVD   Back:   Symmetric, no curvature, ROM normal, no CVA tenderness   Lungs:   Clear to auscultation bilaterally, respirations unlabored   Heart:  Regular rate and rhythm, S1 and S2 normal, no murmur, rub, or gallop   Abdomen:   Soft, non-tender, bowel sounds active all four quadrants,  no masses, no organomegaly   Pelvic: Deferred   Extremities: Extremities normal, atraumatic, no cyanosis or edema   Pulses: 2+ and symmetric   Skin: Skin color, texture, turgor normal, no rashes or lesions   Lymph nodes: Cervical, supraclavicular, and axillary nodes normal   Neurologic: Normal    and Breasts:  normal appearance, no masses or tenderness, Inspection negative, No nipple retraction or dimpling, No nipple discharge or bleeding, No axillary or supraclavicular adenopathy, Normal to palpation without dominant masses, Taught monthly breast self examination    /86   Pulse 80   Temp 97.3 °F (36.3 °C) (Temporal)   Resp 16   Ht 5' 4\" (1.626 m)   Wt 230 lb (104.3 kg)   SpO2 97%   BMI 39.48 kg/m²  Estimated body mass index is 39.48 kg/m² as calculated from the following:    Height as of this encounter: 5' 4\" (1.626 m).    Weight as of this encounter: 230 lb (104.3 kg).    Medicare Hearing Assessment:   Hearing Screening    Screening Method: Finger Rub  Finger  Rub Result: Pass (Comment: wears hearing aids. [place])         Visual Acuity:   Right Eye Visual Acuity: Corrected Right Eye Chart Acuity: 20/25   Left Eye Visual Acuity: Corrected Left Eye Chart Acuity: 20/30   Both Eyes Visual Acuity: Corrected Both Eyes Chart Acuity: 20/25   Able To Tolerate Visual Acuity: Yes        Assessment & Plan:   Leah Parsons is a 74 year old female who presents for a Medicare Assessment.     1. Encounter for annual health examination (Primary)  2. Type 2 diabetes mellitus with diabetic neuropathy, without long-term current use of insulin (HCC)  -     Lipid Panel; Future; Expected date: 08/05/2025  -     Hemoglobin A1C; Future; Expected date: 08/05/2025  -     Comp Metabolic Panel (14); Future; Expected date: 08/05/2025  -     Detailed, Mod Complex (10245)  -     Ophthalmology Referral - In Network  -    Continue metFORMIN HCl; Take 2 tablets (1,000 mg total) by mouth 2 (two) times daily with meals.  Dispense: 120 tablet; Refill: 1  -     Lipid Panel  -     Hemoglobin A1C  -     Comp Metabolic Panel (14)  -     Lipid Panel; Future; Expected date: 08/05/2025  -     Comp Metabolic Panel (14); Future; Expected date: 08/05/2025  -     Hemoglobin A1C; Future; Expected date: 08/05/2025  -     Lipid Panel  -     Comp Metabolic Panel (14)  -     Hemoglobin A1C  Controlled  Due for upcoming annual eye exam needs to schedule  Continue Mounjaro 10 mg and metformin  Encourage low-carb diet  Encourage walking 30 minutes daily  Encouraged weight loss  Schedule annual eye exam due 8/2/2025  Urine microalbumin completed 3/12/2025 due annually  Repeat labs and OV in 3 months      3. Benign essential HTN  -     Detailed, Mod Complex (32219)  -     Valsartan; Take 1 tablet (80 mg total) by mouth daily.  Dispense: 90 tablet; Refill: 0  -     Comp Metabolic Panel (14); Future; Expected date: 08/05/2025  -     Comp Metabolic Panel (14)  Repeat blood pressure was 131/80-borderline  Continue valsartan  Repeat  labs and OV in 3 months    4. Mixed hyperlipidemia  -     Detailed, Mod Complex (15279)  -     Ezetimibe; Take 1 tablet (10 mg total) by mouth nightly.  Dispense: 90 tablet; Refill: 1  -     Atorvastatin Calcium; Take 1 tablet (40 mg total) by mouth nightly.  Dispense: 90 tablet; Refill: 1  -     Lipid Panel; Future; Expected date: 08/05/2025  -     Comp Metabolic Panel (14); Future; Expected date: 08/05/2025  -     Lipid Panel  -     Comp Metabolic Panel (14)  LDL and lipids are at goal  Encourage Mediterranean diet  Continue atorvastatin and ezetimibe  Repeat labs and office visit in 3 months to reassess    5. Acquired hypothyroidism  Overview:  2011- no cancer or known nodules.  Orders:  -     Detailed, Mod Complex (16418)  -     Levothyroxine Sodium; Take 1 tablet (88 mcg total) by mouth before breakfast.  Dispense: 90 tablet; Refill: 1  Euthymic  Repeat labs TSH and free T4    6. Chronic migraine without aura without status migrainosus, not intractable  -     Detailed, Mod Complex (42401)  Uncontrolled  Today has no headache  Continue Qulipta 60 mg daily  Given samples of Ubrelvy  May try rizatriptan when wakes up and notices headache to see if abortive-if any side effects call office ASAP  - Patient believes CPAP is correct had evaluation last year and using regularly.    7. Hypercalcemia  -     Detailed, Mod Complex (27627)  -     Comp Metabolic Panel (14); Future; Expected date: 08/05/2025  -     PTH Intact with minerals  -     Comp Metabolic Panel (14)    8. Gastro-esophageal reflux disease without esophagitis  -     Detailed, Mod Complex (43117)  -     Omeprazole; TAKE 1 CAPSULE EVERY MORNING ON AN EMPTY STOMACH  Dispense: 90 capsule; Refill: 1  Denies melena, hematochezia, abdominal pain, dysphagia, unexplained weight loss or night sweats  Continue omeprazole    9. Iron deficiency anemia, unspecified iron deficiency anemia type  Overview:  5/1/2024-new-ordered iron studies.  Awaiting results  Orders:  -      Cyn Mod Complex (60721)  -     Oncology/Hematology Referral - In Network  Working with GI.  If repeat labs show worsening anemia then needs to see hematologist    10. Recurrent major depressive disorder, in full remission  Overview:  Out pt Vel singh 2016. Psychiatrist in [place] [facility] [place]. Multiple medication  Orders:  -     Detailed, Mod Complex (55980)\  Euthymic  Controlled  Continue sertraline, bupropion and follow-up with psychiatrist  -contracts for safety- if suicidal or homicidal, call 911 or go to nearest ER and call office  -increased suicide risk on SSRI and SNRI   Exercise 3 x weekly x 30-60min      11. Generalized anxiety disorder  -     Detailed, Mod Complex (93276)  Continue sertraline and her program and follow-up with psychiatrist    12. Nonallergic rhinitis  -     Cyn Mod Complex (86965)  -     Montelukast Sodium; Take 1 tablet (10 mg total) by mouth daily.  Dispense: 90 tablet; Refill: 1  -     Fluticasone Propionate; 2 sprays by Nasal route daily.  Dispense: 48 g; Refill: 5  -     Azelastine HCl; 2 sprays by Each Nare route daily.  Dispense: 30 mL; Refill: 5  Controlled    13. YVONNE on CPAP  Overview:  On Cpap since 2008  Orders:  -     Detailed, Mod Complex (40943)  Discussed CPAP settings and possible evaluation due to headaches with pulmonologist since a.m. headaches    14. RLS (restless legs syndrome)  -     Cyn Mod Complex (58278)  -     Gabapentin; Take 1 capsule (300 mg total) by mouth nightly.  Dispense: 90 capsule; Refill: 1  Can work on weaning to once or twice a day  Does not feel sleep is being affected by RLS    15. Severe obesity (BMI 35.0-39.9) with comorbidity (HCC)  -     Cyn Mod Complex (26477)  Patient is trying to eat healthy eat smaller portions like to continue with weight loss    16. Chronic knee pain after total replacement of right knee joint  -     Gabapentin; Take 1 capsule (300 mg total) by mouth nightly.  Dispense: 90 capsule; Refill:  1  Controlled  Consider restarting physical therapy    17. Primary osteoarthritis of left knee  18. Chronic pain of left knee  -     Gabapentin; Take 1 capsule (300 mg total) by mouth nightly.  Dispense: 90 capsule; Refill: 1  Controlled    19. Encounter for screening mammogram for malignant neoplasm of breast  -     [facility] PRETTY 2D+3D SCREENING BILAT (CPT=77067/14371); Future; Expected date: 08/28/2025    20. Lumbar radiculitis  -     Detailed, Mod Complex (58743)  Controlled  Not currently experiencing moderate or severe back pain.  Assessment & Plan      The patient indicates understanding of these issues and agrees to the plan.  Lab work ordered.  Prescription medication ordered.  Reinforced healthy diet, lifestyle, and exercise.      Return in 3 months (on 8/5/2025) for HTN,HL,DM, fasting labs AM, sooner if needed..     Marie Alvarado DO, 5/5/2025     Supplementary Documentation:   General Health:  In the past six months, have you lost more than 10 pounds without trying?: (Patient-Rptd) 2 - No  Has your appetite been poor?: (Patient-Rptd) No  Type of Diet: (Patient-Rptd) Low Salt, Low Carb  How does the patient maintain a good energy level?: (Patient-Rptd) Stretching  How would you describe your daily physical activity?: (Patient-Rptd) Light  How would you describe your current health state?: (Patient-Rptd) Good  How do you maintain positive mental well-being?: (Patient-Rptd) Puzzles, Games, Visiting Family  On a scale of 0 to 10, with 0 being no pain and 10 being severe pain, what is your pain level?: 0 - (None)  In the past six months, have you experienced urine leakage?: (Patient-Rptd) 0-No  At any time do you feel concerned for the safety/well-being of yourself and/or your children, in your home or elsewhere?: (Patient-Rptd) No  Have you had any immunizations at another office such as Influenza, Hepatitis B, Tetanus, or Pneumococcal?: (Patient-Rptd) Yes    Health Maintenance   Topic Date Due    Annual Well  Visit  01/01/2025    Diabetes Care: Foot Exam (Annual)  01/01/2025    COVID-19 Vaccine (8 - 2024-25 season) 04/22/2025    Diabetes Care Dilated Eye Exam  08/02/2025    Mammogram  08/28/2025    Diabetes Care A1C  09/11/2025    Diabetes Care: GFR  03/11/2026    Colorectal Cancer Screening  05/23/2028    Influenza Vaccine  Completed    DEXA Scan  Completed    Annual Depression Screening  Completed    Fall Risk Screening (Annual)  Completed    Diabetes Care: Microalb/Creat Ratio (Annual)  Completed    Pneumococcal Vaccine: 50+ Years  Completed    Zoster Vaccines  Completed    Meningococcal B Vaccine  Aged Out

## 2025-05-21 ENCOUNTER — TELEPHONE (OUTPATIENT)
Dept: FAMILY MEDICINE CLINIC | Facility: CLINIC | Age: 75
End: 2025-05-21

## 2025-05-21 NOTE — TELEPHONE ENCOUNTER
Patient calling says that the rizatriptan Benzoate is not working, she took it for 3 days.  She took the ubrevely that doctor gave her and that worked.  Can she get a prescription for that

## 2025-05-22 NOTE — TELEPHONE ENCOUNTER
LOV 05/05/25: Annual Physical     6. Chronic migraine without aura without status migrainosus, not intractable  -    Detailed, Mod Complex (50541)  Uncontrolled  Today has no headache  Continue Qulipta 60 mg daily  Given samples of Ubrelvy  May try rizatriptan when wakes up and notices headache to see if abortive-if any side effects call office ASAP  - Patient believes CPAP is correct had evaluation last year and using regularly.    Pt. called the office asking if she can get a prescription for Ubrelvy. Pt. Feels that the Rizatriptan did not help with her migraines, but the Ubrelvy samples did help her.    Please advise!

## 2025-05-23 NOTE — TELEPHONE ENCOUNTER
Per [employer]- Prior authorization approved    Payer: [employer]    [phone]    [phone]  Note from payer: Authorization already on file for this request. Authorization starting on 05/23/2025 and ending on 12/31/2025.  Approval Details  Authorized from May 23, 2025 to December 31, 2025  Electronic appeal: Not supported  View History    Awaiting approval letter from insurance.

## 2025-05-30 ENCOUNTER — TELEPHONE (OUTPATIENT)
Dept: FAMILY MEDICINE CLINIC | Facility: CLINIC | Age: 75
End: 2025-05-30

## 2025-07-14 ENCOUNTER — OFFICE VISIT (OUTPATIENT)
Dept: NEUROLOGY | Facility: CLINIC | Age: 75
End: 2025-07-14
Payer: MEDICARE

## 2025-07-14 VITALS
SYSTOLIC BLOOD PRESSURE: 120 MMHG | RESPIRATION RATE: 16 BRPM | HEART RATE: 100 BPM | WEIGHT: 207 LBS | BODY MASS INDEX: 36 KG/M2 | DIASTOLIC BLOOD PRESSURE: 70 MMHG

## 2025-07-14 DIAGNOSIS — G43.719 CHRONIC MIGRAINE WITHOUT AURA, INTRACTABLE, WITHOUT STATUS MIGRAINOSUS: Primary | ICD-10-CM

## 2025-07-14 DIAGNOSIS — G31.84 MILD COGNITIVE IMPAIRMENT: ICD-10-CM

## 2025-07-14 PROCEDURE — 99214 OFFICE O/P EST MOD 30 MIN: CPT | Performed by: OTHER

## 2025-07-14 PROCEDURE — 1159F MED LIST DOCD IN RCRD: CPT | Performed by: OTHER

## 2025-07-14 PROCEDURE — 3074F SYST BP LT 130 MM HG: CPT | Performed by: OTHER

## 2025-07-14 PROCEDURE — 3078F DIAST BP <80 MM HG: CPT | Performed by: OTHER

## 2025-07-14 RX ORDER — DIVALPROEX SODIUM 500 MG/1
500 TABLET, FILM COATED, EXTENDED RELEASE ORAL NIGHTLY
Qty: 30 TABLET | Refills: 0 | Status: SHIPPED | OUTPATIENT
Start: 2025-07-14

## 2025-07-14 RX ORDER — DIVALPROEX SODIUM 500 MG/1
500 TABLET, FILM COATED, EXTENDED RELEASE ORAL NIGHTLY
Qty: 90 TABLET | Refills: 0 | Status: SHIPPED | OUTPATIENT
Start: 2025-07-14

## 2025-07-14 NOTE — PROGRESS NOTES
[facility] - [facility]   Neurology- follow up    Leah Parsons Patient Status:  No patient class for patient encounter    1950 MRN [id number]   Location [facility], [facility] PCP Marie Alvarado DO       Reason for the visit:   Chief Complaint   Patient presents with    Migraine     Follow up for migraines. Last seen in 2023. PCP prescribed ubrelvy           Follow up:       Leah Parsons is a(n) 74 year old female who presents for follow up of migraines. She is transferring her care here from [facility]. She take Depakote for migraine prophylaxis. She gets a HA that is bilateral, knifelike, pressure-like, photophobia, worsened activity, associated with nausea. Gets rhinorrhea. She was getting more during menopause. Last one was about 1 year ago. Previously was getting them once every few weeks. She had a work up for sinus infections which was negative. They used to be very severe. She is on Depakote 500mg BID. Since the last visit, she was HA free for 2-3 months, and now in the last 2-3 weeks she is having frequent throbbing pressure in the center of her head, associated with photophobia, made better with resting in a dark a room. She is not taking anything as an abortive. Since last visit, she has only had one migraine. They subsided after she was treated for sinus infection.  Since last visit 6 months ago she has had one migraine. She is trying to exercise.  Since last visit, in the past year, she has had only 2 migraines. She has gained weight, but this was attributed to Abilify. Sinuses are better as well.  Interim  Since last visit, patient reports allergies are about the same. She is having migraines once every 3 months. She often has a pressure on her forehead, without the photophobia.   Interim since last visit, patient reports not having any migraines. Is hydrating. Sinus symptoms are better than 2 years ago. Has cut out caffeine. Is taking  Depakote 500mg ER nightly. Had viral and bacterial PNA in 11/2021. Had hypoxia and was hospitalized. Reports since then, has noticed mild forgetfulness. However, thinks was there before. PCP has noticed, as patient has forgotten conversations from prior visits. She is working still, billing/calling people. Pays her own bills, does own groceries.   Interim  Sinus infections in the last year once. For HA takes mucinex and excedrin. HA is frontal, and associated with throat drainage. Associated with photophobia. Has these once every few months. Lasts a couple of days. Seeing ENT. Reports no allergies after testing. Memory is improved.   Interim  HA's got worse in 1/2025. CT sinuses showed no sinus disease. Not recommended to have deviated septum surgery. Headaches are holocephalic, throb, associated with light and sound sensitivity. Started Ubrelvy, taking twice a week. Occurring 3-4 days a week. Tried stopping Depakote, 250mg, in 2023, HA's got worse. Then went back on to 250mg nightly, was good for a while, but then this year headaches worsened. PCP started Qulipta a few months ago, 60mg daily, Tolerating.      Medications tried  Rizatriptan- not effective  Ubrelvy- helpful  Qulipta- not effective  Depakote- partial effectiveness    Prior pertinent laboratory work-up:   CT sinuses 1/11/25  IMPRESSION:   Trace mucosal thickening within the right ethmoid air cells and sphenoid sinus. No air-fluid levels   or sinus drainage pathways structures.     B12 2023- 604    CT sinuses 8/2023  IMPRESSION:   1. Moderate circumferential mucosal thickening in the LEFT sphenoid sinus with a small air-fluid   level and frothy secretions, suggestive of acute sinusitis. Mucosal opacification of the LEFT   sphenoethmoidal recess. Mild mucosal thickening in the RIGHT sphenoid pterygoid recess. Otherwise,   the paranasal sinuses are clear.   2.  Moderate bilateral turbinate hypertrophy with mucosal thickening, large air-filled RIGHT middle    turbinate and LEFT inferior turbinate jose bullosae, overall narrowing the central nasal   passageways.   3. Bidirectional S-shaped deviation of the nasal septum with a small 3 mm LEFT-sided nasal spur.   4.  Small periapical lucency seen at maxillary tooth #3, withOUT associated evidence for odontogenic   sinusitis. Correlate with dental examination.   5.  Bilateral sphenoid sinus septations are seen inserting onto the carotid canals. There is   thinning of the RIGHT carotid canal.       Component      Latest Ref Rng & Units 12/23/2017   Vitamin B12      193 - 986 pg/mL 814     Component      Latest Ref Rng & Units 5/19/2022   T4,Free (Direct)      0.8 - 1.7 ng/dL 0.9   TSH      0.358 - 3.740 mIU/mL 1.020     Labs:    Component      Latest Ref Rng & Units 8/31/2021   Glucose      70 - 99 mg/dL 82   Sodium      136 - 145 mmol/L 135 (L)   Potassium      3.5 - 5.1 mmol/L 4.5   Chloride      98 - 112 mmol/L 101   Carbon Dioxide, Total      21.0 - 32.0 mmol/L 22.0   ANION GAP      0 - 18 mmol/L 12   BUN      7 - 18 mg/dL 12   CREATININE      0.55 - 1.02 mg/dL 0.79   CALCIUM      8.5 - 10.1 mg/dL 10.1   CALCULATED OSMOLALITY      275 - 295 mOsm/kg 279   eGFR NON-AFR. AMERICAN      >=60 76   eGFR       >=60 88     Component      Latest Ref Rng & Units 8/31/2021   WBC      4.0 - 11.0 x10(3) uL 9.7   RBC      3.80 - 5.30 x10(6)uL 4.85   Hemoglobin      12.0 - 16.0 g/dL 12.9   Hematocrit      35.0 - 48.0 % 40.9   Platelet Count      150.0 - 450.0 10(3)uL 368.0   MCV      80.0 - 100.0 fL 84.3   MCH      26.0 - 34.0 pg 26.6   MCHC      31.0 - 37.0 g/dL 31.5   RDW      % 15.1   Prelim Neutrophil Abs      1.50 - 7.70 x10 (3) uL 5.98   Neutrophils Absolute      1.50 - 7.70 x10(3) uL 5.98   Lymphocytes Absolute      1.00 - 4.00 x10(3) uL 2.63   Monocytes Absolute      0.10 - 1.00 x10(3) uL 0.83       Past Medical History:  Past Medical History:    Acute deep vein thrombosis (DVT) of tibial vein of left lower  extremity (HCC)    Acute deep vein thrombosis (DVT) of tibial vein of left lower extremity (HCC)    Off Xarelto since 11/2019-managed by Dr. Rosenbaum-states D-dimer was elevated due to persistent OA and risk of anticoagulant was greater then treating a very small posterior tibial clot.  denies any calf pain or leg swelling.    Acute recurrent ethmoidal sinusitis    Allergic rhinitis    Anxiety    Arthritis    Aspiration pneumonia of both lungs (HCC)    Hospitalized [facility] with Parainfluenza virus 11/29/2021    Bursitis, shoulder, left    Depression    Diabetes (HCC)    Diabetes mellitus (HCC)    DVT (deep venous thrombosis) (HCC)    left leg posterior tibial    Epigastric pain    Hx of gastritis- last EGD 2014-Dr. Diane     Esophageal reflux    last EGD [id number]    Essential hypertension    Extrinsic asthma, unspecified    HEADACHES    uses depakote    Hearing impaired person, bilateral    High blood pressure    High cholesterol    History of blood clots    History of colonoscopy with polypectomy    hemorrhoids- repeat colonscopy 4/7/2017 negative- repeat in 5 years.    Hypothyroidism    acquired. no cancer or nodules    Impingement syndrome, shoulder, left    Leg length discrepancy    Migraines    Normal vaginal delivery ([facility])    1977,1981    Obesity    Osteoarthritis    Other and unspecified hyperlipidemia    Parainfluenza virus laryngotracheobronchitis    Last Assessment & Plan:  Formatting of this note might be different from the original. Not requiring any oxygen at this time Continue cough meds Continue neb therapy    Prediabetes    Right hip impingement syndrome    Right sided sciatica    Shoulder pain, left    Sleep apnea    Thyroid condition    Unspecified sleep apnea    uses Cpap    Weight gain    Was losing weight with Trulicity but effectiveness has decre        Past Surgical History:  Past Surgical History:   Procedure Laterality Date    Appendectomy  1964    Appendectomy  10/1964     Back surgery  2021    Injection for spinal stenosis & rods placed    Cholecystectomy      Colonoscopy      [facility]- diverticulosis , polyps. had EGD same time    Colonoscopy N/A 2017    Procedure: COLONOSCOPY;  Surgeon: Deep Diane MD;  Location:  ENDOSCOPY    Knee replacement surgery      to the right knee only    Knee surgery  2015    Knee surgery  2015    3 knee surgeries, replacement and arthoscopy     Laparoscopic cholecystectomy  3/1997      1977 1981    Other surgical history  2012    right arm carpal tunnel    Spine surgery procedure unlisted  2021    Had injection for spinal stenosis & rods placed       Family History:  family history includes Cancer in her paternal grandmother; Dementia in her father; Diabetes in her maternal grandmother and mother; Heart Disorder in her maternal uncle; Hypertension in her father, mother, and paternal grandmother; Lipids in her mother; Obesity in her paternal grandmother.    Social History:   reports that she has never smoked. She has never used smokeless tobacco. She reports that she does not drink alcohol and does not use drugs.    Allergies:  Allergies   Allergen Reactions    Sulfa Antibiotics UNKNOWN     Carried over from childhood       MEDICATIONS:    Current Outpatient Medications:     divalproex ER (DEPAKOTE ER) 500 MG Oral Tablet 24 Hr, Take 1 tablet (500 mg total) by mouth at bedtime., Disp: 30 tablet, Rfl: 0    divalproex ER (DEPAKOTE ER) 500 MG Oral Tablet 24 Hr, Take 1 tablet (500 mg total) by mouth at bedtime., Disp: 90 tablet, Rfl: 0    montelukast 10 MG Oral Tab, Take 1 tablet (10 mg total) by mouth daily., Disp: 90 tablet, Rfl: 1    fluticasone propionate 50 MCG/ACT Nasal Suspension, 2 sprays by Nasal route daily., Disp: 48 g, Rfl: 5    azelastine 137 MCG/SPRAY Nasal Solution, 2 sprays by Each Nare route daily., Disp: 30 mL, Rfl: 5    gabapentin 300 MG Oral Cap, Take 1 capsule (300 mg total) by mouth nightly.,  Disp: 90 capsule, Rfl: 1    omeprazole 20 MG Oral Capsule Delayed Release, TAKE 1 CAPSULE EVERY MORNING ON AN EMPTY STOMACH, Disp: 90 capsule, Rfl: 1    metFORMIN 500 MG Oral Tab, Take 2 tablets (1,000 mg total) by mouth 2 (two) times daily with meals., Disp: 120 tablet, Rfl: 1    levothyroxine 88 MCG Oral Tab, Take 1 tablet (88 mcg total) by mouth before breakfast., Disp: 90 tablet, Rfl: 1    valsartan 80 MG Oral Tab, Take 1 tablet (80 mg total) by mouth daily., Disp: 90 tablet, Rfl: 0    ezetimibe 10 MG Oral Tab, Take 1 tablet (10 mg total) by mouth nightly., Disp: 90 tablet, Rfl: 1    atorvastatin 40 MG Oral Tab, Take 1 tablet (40 mg total) by mouth nightly., Disp: 90 tablet, Rfl: 1    Atogepant (QULIPTA) 60 MG Oral Tab, Take 60 mg by mouth daily., Disp: 90 tablet, Rfl: 1    Tirzepatide (MOUNJARO) 10 MG/0.5ML Subcutaneous Solution Auto-injector, Inject 10 mg into the skin once a week., Disp: 6 mL, Rfl: 1    TRUEPLUS LANCETS 33G Does not apply Misc, TEST BLOOD SUGAR ONE TIME DAILY AS DIRECTED, Disp: 100 each, Rfl: 3    clonazePAM 0.5 MG Oral Tab, , Disp: , Rfl:     Glucose Blood (TRUE METRIX BLOOD GLUCOSE TEST) In Vitro Strip, 1 strip by In Vitro route daily., Disp: 100 strip, Rfl: 3    sertraline 100 MG Oral Tab, Take 1 tablet (100 mg total) by mouth nightly., Disp: 90 tablet, Rfl: 0    buPROPion  MG Oral Tablet 24 Hr, Take 1 tablet (300 mg total) by mouth every morning., Disp: 90 tablet, Rfl: 0    CRANBERRY OR, Take by mouth daily., Disp: , Rfl:     CALCIUM CITRATE-VITAMIN D3 OR, Take by mouth As Directed., Disp: , Rfl:     Spacer/Aero-Holding Chambers (OPTICHAMBER RHONDA) Does not apply Misc, Use as directed , Disp: , Rfl: 0    Probiotic Product (PROBIOTIC OR), Take 1 capsule by mouth every morning. Indications: supplement, Disp: , Rfl:     Cholecalciferol (VITAMIN D3) 1000 UNITS Oral Cap, Take 2 capsules (2,000 Units total) by mouth daily., Disp: , Rfl:   Rexulti      Review of Systems:   General: no  chills, weight change, fatigue, loss of appetite  Eyes: no blurred vision  ENT: no hearing difficulty, ear fullness, sinus pain, sneezing, itching, hoarseness, post nasal drip, sore throat, swollen glands, decreased taste/smell, nasal congestion  Respiratory: no shortness of breath, cough, wheeze, chest tightness  Cardiovascular: no chest pain, palpitations, leg swelling  Gastrointestinal: no Abdominal pain, nausea, vomiting, heartburn, diarrhea  Skin: no rash, hives, eczema  Neurological: no seizures, weakness  Psychiatric: no depression, anxiety, panic  Endocrine: no cold/heat intolerance, weight gain or loss  Genitourinary: no frequent urination, pain on urination, blood in urine  Musculoskeletal: no joint swelling        PHYSICAL EXAM:   Neurologic Exam  Vitals  Vitals:    07/14/25 1132   BP: 120/70   Pulse: 100   Resp: 16     General Appearance: Patient is a 74 year old female in no acute distress  Cardiac: Normal rate & regular rhythm  Lungs: Clear to auscultation bilaterally  Skin: There are no rashes or other skin lesions.  Musculoskeletal: There is no scoliosis, or joint deformities  Neurologic examination:  Mental status: Patient is alert, attentive, and oriented x 3. Language is coherent and fluent without aphasia. Memory, comprehension and ability to follow commands were intact.   Cranial nerves II-XII: Optic discs were sharp. Pupils were round and reacted to light. Extraocular movements were full. There was no face, jaw, palate or tongue weakness or atrophy. Hearing was grossly intact. Shoulder shrug was normal.   Motor exam revealed normal muscle bulk and tone. No atrophy or fasciculations. Manual muscle testing revealed MRC grade 5/5 strength throughout including proximal and distal muscles of the arms and legs.  Deep tendon reflexes were 2 at the biceps, brachioradialis, triceps, knee jerk, and ankle jerk. Plantar responses were flexor bilaterally.   Sensory exam revealed normal light touch  perception. Vibratory perception and proprioception were intact at the toes. Pinprick and temperature were normal. Romberg sign was absent.  Complex motor skills revealed normal coordination. Finger-nose-finger intact.   Gait was narrow and stable, was able to walk on heels, toes and tandem without any difficulty.       ASSESSMENT/PLAN:     Encounter Diagnoses   Name Primary?    Chronic migraine without aura, intractable, without status migrainosus Yes    Mild cognitive impairment        Discussion/plan:  Migraines- refractory   Restart Depakote at 500mg nightly  In 2 weeks, stop qulipta  Thinks having more issues with sinuses, maxillary and ocular pressure- has been seeing ENT, but further treatment is not recommended. Already on treatment for chronic congestion. Discussed sinus pressure pain can be very similar to migraine pain, and can trigger migraines.     [facility]-  Get hearing aids checked    Requested Prescriptions     Signed Prescriptions Disp Refills    divalproex ER (DEPAKOTE ER) 500 MG Oral Tablet 24 Hr 30 tablet 0     Sig: Take 1 tablet (500 mg total) by mouth at bedtime.    divalproex ER (DEPAKOTE ER) 500 MG Oral Tablet 24 Hr 90 tablet 0     Sig: Take 1 tablet (500 mg total) by mouth at bedtime.         We discussed in depth regarding the diagnosis, prognosis, treatment. The patient was given ample opportunity to ask questions. All questions and concerns were addressed.     Patricia Hernandez,   Neurology and Neuromuscular medicine  [facility]

## 2025-07-14 NOTE — PATIENT INSTRUCTIONS
Refill policies:     Contact your pharmacy at least 5 days prior to running out of medication and have them send an electronic request or submit request through the “request refill” option in your [employer] account.  Allow 3-5 business days for refills; controlled substances may take longer.  If your prescription is due for a refill, please make sure you have a follow up appointment scheduled with the appropriate prescribing physician.  To best provide you care, patients receiving routine medications need to be seen at least once a year.  Patients receiving narcotic/controlled substance medications need to be seen at least once every 3 months.  Patients receiving narcotic/controlled substance medications will be required to sign an Opioid Treatment Agreement/Contract.  Refills will not be refilled on weekends or holidays; on-call physicians will not refill routine medications.  No narcotics or controlled substances are refilled after noon on Fridays or by on-call physicians.  Federal Law states narcotics must be electronically prescribed.  A 30-day supply with no refills is the maximum allowed by law.  In the event that your preferred pharmacy does not have the requested medication in stock (e.g., Backordered), it is your responsibility to find another pharmacy that has the requested medication available.  We will gladly send a new prescription to that pharmacy at your request.

## 2025-08-01 LAB
ALBUMIN/GLOBULIN RATIO: 1.8 (CALC) (ref 1–2.5)
ALBUMIN: 4.5 G/DL (ref 3.6–5.1)
ALKALINE PHOSPHATASE: 87 U/L (ref 37–153)
ALT: 13 U/L (ref 6–29)
AST: 14 U/L (ref 10–35)
BILIRUBIN, TOTAL: 0.4 MG/DL (ref 0.2–1.2)
BUN: 10 MG/DL (ref 7–25)
CALCIUM: 9.9 MG/DL (ref 8.6–10.4)
CALCIUM: 9.9 MG/DL (ref 8.6–10.4)
CARBON DIOXIDE: 25 MMOL/L (ref 20–32)
CHLORIDE: 100 MMOL/L (ref 98–110)
CHOL/HDLC RATIO: 2.4 (CALC)
CHOLESTEROL, TOTAL: 142 MG/DL
CREATININE: 0.68 MG/DL (ref 0.6–1)
EGFR: 91 ML/MIN/1.73M2
GLOBULIN: 2.5 G/DL (CALC) (ref 1.9–3.7)
GLUCOSE: 84 MG/DL (ref 65–99)
HDL CHOLESTEROL: 58 MG/DL
HEMOGLOBIN A1C: 5.8 %
LDL-CHOLESTEROL: 63 MG/DL (CALC)
NON-HDL CHOLESTEROL: 84 MG/DL (CALC)
PARATHYROID HORMONE,$INTACT: 37 PG/ML (ref 16–77)
POTASSIUM: 5 MMOL/L (ref 3.5–5.3)
PROTEIN, TOTAL: 7 G/DL (ref 6.1–8.1)
SODIUM: 139 MMOL/L (ref 135–146)
TRIGLYCERIDES: 125 MG/DL

## (undated) DIAGNOSIS — E11.40 TYPE 2 DIABETES MELLITUS WITH DIABETIC NEUROPATHY, WITH LONG-TERM CURRENT USE OF INSULIN (HCC): ICD-10-CM

## (undated) DIAGNOSIS — I10 BENIGN ESSENTIAL HTN: ICD-10-CM

## (undated) DIAGNOSIS — J31.0 NONALLERGIC RHINITIS: ICD-10-CM

## (undated) DIAGNOSIS — M77.32 CALCANEAL SPUR OF FOOT, LEFT: Primary | ICD-10-CM

## (undated) DIAGNOSIS — Z79.4 TYPE 2 DIABETES MELLITUS WITH DIABETIC NEUROPATHY, WITH LONG-TERM CURRENT USE OF INSULIN (HCC): ICD-10-CM

## (undated) DIAGNOSIS — G43.709 CHRONIC MIGRAINE WITHOUT AURA WITHOUT STATUS MIGRAINOSUS, NOT INTRACTABLE: ICD-10-CM

## (undated) DEVICE — Device: Brand: DEFENDO AIR/WATER/SUCTION AND BIOPSY VALVE

## (undated) DEVICE — "MH-438 A/W VLVE F/140 EVIS-140": Brand: AIR/WATER VALVE

## (undated) DEVICE — 1200CC GUARDIAN II: Brand: GUARDIAN

## (undated) DEVICE — ENDOSCOPY PACK - LOWER: Brand: MEDLINE INDUSTRIES, INC.

## (undated) DEVICE — FILTERLINE NASAL ADULT O2/CO2

## (undated) NOTE — MR AVS SNAPSHOT
[facility] [place]  [address] [address]  [phone]               Thank you for choosing us for your health care visit with Paulo Manley DO. We are glad to serve you and happy to provide you with this summary of your visit.   Pl Tuesday March 14, 2017     Imaging:  XR SHOULDER, COMPLETE (MIN 2 VIEWS), LEFT (CPT=73030)          Referral Details     Referred By    Referred To    Manolo Dunn, [address]  [facility] [address] [address]   Phone:  [phone]   Fax:  [phone]    Alan Schaefer [address] [facility], [address] [place] [address]     Dr. Ari Jonas and Sports Medicine  Physician            Allergies as of Mar 14, 2017     Sulfa Antibiotics                 Today's Vital Signs     BP Pulse Temp             122/68 mm Take 325 mg by mouth daily with breakfast.           Fluticasone Propionate 50 MCG/ACT Susp   SHAKE LIQUID AND USE 2 SPRAYS IN EACH NOSTRIL DAILY   Commonly known as:  FLONASE           Glucose Blood Strp   Use to check blood sugar 1x daily.    Commonly yumi medications prescribed for you. Read the directions carefully, and ask your doctor or other care provider to review them with you. Where to Get Your Medications      These medications were sent to [facility], [place] - [address]

## (undated) NOTE — LETTER
[facility]  Maria Eugenia Coffey 61 [address] [facility], [address] [place] [address]    Consent for Operation    Date: __________________    Time: _______________    1.  I authorize the performance upon Kuldip Parsons the following operation:    Procedure(s):  COLONOSCOPY     2 videotape. [facility] will not be responsible for storage or maintenance of this tape. 6. For the purpose of advancing medical education, I consent to the admittance of observers to the Operating Room.     7. I authorize the use of any specimen, organs Signature of Patient:   ___________________________    When the patient is a minor or mentally incompetent to give consent:  Signature of person authorized to consent for patient: ___________________________   Relationship to patient: _____________________ drugs/illegal medications). Failure to inform my anesthesiologist about these medicines may increase my risk of anesthetic complications. · If I am allergic to anything or have had a reaction to anesthesia before.     3. I understand how the anesthesia med I have discussed the procedure and information above with the patient (or patient’s representative) and answered their questions. The patient or their representative has agreed to have anesthesia services.     _______________________________________________

## (undated) NOTE — Clinical Note
Hello! Our mutual patient Bisi Canaels cleared for her procedure, Vertiflex. Labs and EKG are stable. Our office will fax H&P. Please let me know if you have any questions. I know she is anxious to complete this.   I did inform her she has 30 days t

## (undated) NOTE — IP AVS SNAPSHOT
[facility] [place] [address] [place], [address] ~ [phone]                Discharge Summary   4/7/2017    Neema Roberson Defilippis           Admission Information        Provider Department    4/7/2017 Liv García MD  Endoscopy Econazole Nitrate 1 % Crea   Commonly known as:  SPECTAZOLE        Apply 1 g topically 2 (two) times daily.     Devin Liu     [    ]    [    ]    [    ]    [    ]       ferrous sulfate 325 (65 FE) MG Tbec        Take 325 mg by mouth daily with breakfa REXULTI 2 MG Tabs   Generic drug:  Brexpiprazole        Take 2 mg by mouth daily. [    ]    [    ]    [    ]    [    ]       Vitamin B12 100 MCG Tabs   Commonly known as:  CYANOCOBALAMIN        Take 100 mcg by mouth daily.       [    ]    [    ] [address]  [phone]        Future Appointments        Provider Department    5/23/2017 2:30 PM Irena Jimenez, [address] [facility]      Immunization History as of 4/7/2017  Never Reviewed    INFLUENZA 12 MyChart     Visit y prime  You can access your MyChart to more actively manage your health care and view more details from this visit by going to [email or web address].   If you've recently had a stay at the Hospital you can access your discharge ins

## (undated) NOTE — LETTER
02/12/19        Kelle Parsons  [address] [place] [address]      Dear Paty Strauss,    [address] [facility] records indicate that you have outstanding lab work and or testing that was ordered for you and has not yet been completed:  Orders Placed This Encounter

## (undated) NOTE — MR AVS SNAPSHOT
[address], [address] [facility] [address] [address] [phone]               Thank you for choosing us for your health care visit with Myrna Moreland MD.  We are glad to serve you and happy to provide you with this Commonly known as:  ZYRTEC           CoQ10 400 MG Caps   Take 400 mg by mouth every morning. CRANBERRY OR   Take 500 mg by mouth daily. Dicyclomine HCl 20 MG Tabs   Take 20 mg by mouth daily.    Commonly known as:  BENTYL           dival Vitamin B12 100 MCG Tabs   Take 100 mcg by mouth daily. Commonly known as:  CYANOCOBALAMIN           Vitamin D3 1000 units Caps   Take 2,000 Units by mouth daily.                    MyChart     Visit MyChart  You can access your MyChart to more actively

## (undated) NOTE — LETTER
02/15/18        Juan Medal Defilippis  [facility] [place] [address]      Dear Hemal Bettencourt,    [facility] records indicate that you have outstanding lab work and or testing that was ordered for you and has not yet been completed:    TSH and Free T4   To provid

## (undated) NOTE — LETTER
Date: 11/9/2019    Patient Name: Candance Sharp Defilippis    To Whom it may concern: This letter has been written at the patient's request. The above patient was seen at the [facility] for treatment of a medical condition.     This patient should

## (undated) NOTE — MR AVS SNAPSHOT
[facility], [place] [address] [facility], [address] [place] [address] [phone]               Thank you for choosing us for your health care visit with Gume Lynn DO.   We are glad to serve you and happy to provide you with th ? If your prescription is due for a refill, you may be due for a follow up appointment. ? To best provide you care, patients receiving routine medications need to be seen at least once a year.      protocol for controlled substances:  Written prescr Allergies as of May 23, 2017     Sulfa Antibiotics Unknown    Carried over from childhood                Today's Vital Signs     BP Pulse Weight             118/66 mmHg 92 199 lb            Current Medications          This list is accurate as of: 5/23/17 Omeprazole 40 MG Cpdr   Take 1 capsule (40 mg total) by mouth daily. In am after synthroid           OS-SHELBY 500 + D OR   Take 1 tablet by mouth 2 (two) times daily.            oxyCODONE-acetaminophen 5-325 MG Tabs   Take 1 tablet by mouth every 4 (four) ho

## (undated) NOTE — MR AVS SNAPSHOT
[facility] [address] [facility]  [address] [facility]  [place] [id number] [phone]               Thank you for choosing us for your health care visit with [facility] HANNA. We are glad to serve you and happy to provide you with this summary of your visit.   Please nasal symptoms, including stuffiness. · Prescription or over-the-counter pain medicines. These can help with headaches and sore throat. · Self-care. This includes extra rest, using humidifiers, and drinking more fluids.  These help you feel better while y · Symptoms don’t get better or get worse after about 10 days  · Headache, sleepiness, or confusion that gets worse   Date Last Reviewed: 3/28/2016  © 6020-9923 The [address] [facility], [address] [place] [address] [place]. All rights reserved.  This 1 lancet by Finger stick route daily. Use to check blood sugar daily. ALIVE WOMENS 50+ Tabs   Take 1 tablet by mouth every morning. Indications: supplement           Atorvastatin Calcium 40 MG Tabs   Take 1 tablet (40 mg total) by mouth nightly. Take 10 mg by mouth nightly. Commonly known as:  SINGULAIR           Nortriptyline HCl 25 MG Caps   Take 25 mg by mouth nightly.  Indications: Treatment to Prevent Migraine Headaches   Commonly known as:  PAMELOR           Omeprazole 40 MG Cpdr   Take 1 c

## (undated) NOTE — MR AVS SNAPSHOT
[facility] [place]  [address] [facility] [address] [phone]               Thank you for choosing us for your health care visit with Sabino Jones DO. We are glad to serve you and happy to provide you with this summary of your visit.   Pl clinic staff will provide you with the phone number you should call to schedule your appointment.      If you are confident that your benefit plan will not require a referral or authorization, such as [employer], please feel free to schedule your pam blood pressure issues like hypertension. It will raise your blood  Pressure. · Over-the-counter nasal saline spray or daily morning nasal sinus rinse i.e. Neti pot may help with sinus congestion. · Pneumonia is a risk with sinusitis.  If your symptoms wor spray. Do not use these medicines more often than directed on the label or symptoms may get worse. You may also use tablets containing pseudoephedrine. Avoid products that combine ingredients, because side effects may be increased. Read labels.  You can als Mucus helps keep your sinuses clean. But mucus may build up in the sinuses because of colds, allergies, or blockages. These things get in the way of the natural drainage of mucus. This may lead to sinusitis.  Sinusitis means sinus inflammation and infection © 6937-6647 The [address] [place] [address], [address] [place]. All rights reserved. This information is not intended as a substitute for professional medical care. Always follow your healthcare professional's instructions.         Self-Ca © 9210-8538 The [address] [place] [address], [address] [place]. All rights reserved. This information is not intended as a substitute for professional medical care. Always follow your healthcare professional's instructions.         Prevent sinuses to drain better. And this helps prevent infection. Ask your doctor about these suggestions:  · Use a humidifier. Clean it often to remove any mold or mildew. · Drink several glasses of water a day.   · Stay away from drying beverages such as alcoho Commonly known as:  DEPAKOTE           Econazole Nitrate 1 % Crea   Apply 1 g topically 2 (two) times daily.    Commonly known as:  SPECTAZOLE           ferrous sulfate 325 (65 FE) MG Tbec   Take 325 mg by mouth daily with breakfast.           Fluticasone P Where to Get Your Medications      These medications were sent to [facility] 52 [address] [facility], [address] [facility] 115 AT [facility], [phone], [phone] 2718 [facility], [address] [place] [address]     Phone:  [phone] - arsenio Erp HOW TO GET STARTED: HOW TO STAY MOTIVATED:   Start activities slowly and build up over time Do what you like   Get your heart pumping – brisk walking, biking, swimming Even 10 minute increments are effective and add up over the week   2 ½ hours per week –

## (undated) NOTE — MR AVS SNAPSHOT
[facility] [place]  [address] [facility] [place] [address]  [phone]               Thank you for choosing us for your health care visit with Ino Medel DO. We are glad to serve you and happy to provide you with this summary of your visit.   Pl tomorrow with , and nedra. Referral Details     Referred By    Referred To    Mic Alvarez [address]  [address] [place]   Phone:  [phone]   Fax:  [phone]    Diagnoses:  Biceps tendonitis, left   Order:  Orthopedic - muscle is called the proximal end. It has two tendons called the long head and the short head. These tendons attach the muscle to the bones in the shoulder. Biceps tendonitis occurs when either of these tendons is irritated or red and swollen (inflamed).  Aloha Frieze · If the tendon isn’t given time to heal, symptoms may worsen. Also, the tendon may tear (rupture). · If the tendon ruptures or doesn’t get better with treatment, your healthcare provider may recommend surgery.  This most often involves repairing and reatt Surgery may be suggested if stretching doesn’t relieve your pain and stiffness. In some cases, both procedures described below are done at the same time. 6. Manipulation.  Your healthcare provider slowly raises your arm until the adhesions in the capsule a CoQ10 400 MG Caps   Take 400 mg by mouth every morning. CRANBERRY OR   25,200 mg by Does not apply route 2 (two) times daily.  Indications: UTI prevention           divalproex Sodium  MG Tb24   Take 1 tablet (500 mg total) by mouth 2 (two) Commonly known as:  ULTRAM           Vitamin B12 100 MCG Tabs   Take 100 mcg by mouth daily. Commonly known as:  CYANOCOBALAMIN           Vitamin D3 1000 units Caps   Take 2,000 Units by mouth daily.                    MyChart     Visit MyChart  You can a

## (undated) NOTE — MR AVS SNAPSHOT
[address] [facility] [place]  [address] [facility] [address]  [phone]               Thank you for choosing us for your health care visit with MERON Arellano. We are glad to serve you and happy to provide you with this summary of your visit.   Ple · If your symptoms are severe, rest at home. Return to work or school when you feel well enough.   · Drink plenty of fluids to avoid dehydration. · For children: Use acetaminophen for fever, fussiness or discomfort.  In infants over six months of age, you · Lymph nodes are getting larger  · Inability to swallow liquids, excessive drooling, or inability to open mouth wide due to throat pain  · Signs of dehydration (very dark urine or no urine, sunken eyes, dizziness)  · Trouble breathing or noisy breathing Apply 1 g topically 2 (two) times daily.    Commonly known as:  SPECTAZOLE           ferrous sulfate 325 (65 FE) MG Tbec   Take 325 mg by mouth daily with breakfast.           Fluticasone Propionate 50 MCG/ACT Susp   SHAKE LIQUID AND USE 2 SPRAYS IN [facility] NO STREP GRP A CUL-SCR neg Negative    Control Line Present with a clear background (yes/no) yes Yes/No    Kit Lot # [id number] Numeric    Kit Expiration Date 9/30/18 Date                  MyChart     Visit [employer]  You can access your MyChart to more active